# Patient Record
Sex: FEMALE | Race: OTHER | Employment: OTHER | URBAN - METROPOLITAN AREA
[De-identification: names, ages, dates, MRNs, and addresses within clinical notes are randomized per-mention and may not be internally consistent; named-entity substitution may affect disease eponyms.]

---

## 2017-10-13 ENCOUNTER — OFFICE VISIT (OUTPATIENT)
Dept: INTERNAL MEDICINE CLINIC | Facility: CLINIC | Age: 67
End: 2017-10-13

## 2017-10-13 VITALS
TEMPERATURE: 99 F | HEART RATE: 65 BPM | SYSTOLIC BLOOD PRESSURE: 128 MMHG | HEIGHT: 62.5 IN | WEIGHT: 191.38 LBS | DIASTOLIC BLOOD PRESSURE: 76 MMHG | BODY MASS INDEX: 34.34 KG/M2

## 2017-10-13 DIAGNOSIS — R14.0 BLOATED ABDOMEN: ICD-10-CM

## 2017-10-13 DIAGNOSIS — Z78.0 POSTMENOPAUSAL: ICD-10-CM

## 2017-10-13 DIAGNOSIS — Z23 NEED FOR VACCINATION: ICD-10-CM

## 2017-10-13 DIAGNOSIS — K21.9 GASTROESOPHAGEAL REFLUX DISEASE WITHOUT ESOPHAGITIS: ICD-10-CM

## 2017-10-13 DIAGNOSIS — Z00.00 ADULT GENERAL MEDICAL EXAMINATION: ICD-10-CM

## 2017-10-13 DIAGNOSIS — Z12.39 BREAST CANCER SCREENING: Primary | ICD-10-CM

## 2017-10-13 DIAGNOSIS — R00.2 PALPITATIONS: ICD-10-CM

## 2017-10-13 PROCEDURE — 90670 PCV13 VACCINE IM: CPT | Performed by: INTERNAL MEDICINE

## 2017-10-13 PROCEDURE — G0009 ADMIN PNEUMOCOCCAL VACCINE: HCPCS | Performed by: INTERNAL MEDICINE

## 2017-10-13 PROCEDURE — 99387 INIT PM E/M NEW PAT 65+ YRS: CPT | Performed by: INTERNAL MEDICINE

## 2017-10-13 PROCEDURE — G0402 INITIAL PREVENTIVE EXAM: HCPCS | Performed by: INTERNAL MEDICINE

## 2017-10-13 PROCEDURE — 90653 IIV ADJUVANT VACCINE IM: CPT | Performed by: INTERNAL MEDICINE

## 2017-10-13 PROCEDURE — G0008 ADMIN INFLUENZA VIRUS VAC: HCPCS | Performed by: INTERNAL MEDICINE

## 2017-10-13 PROCEDURE — 81003 URINALYSIS AUTO W/O SCOPE: CPT | Performed by: INTERNAL MEDICINE

## 2017-10-13 PROCEDURE — 99204 OFFICE O/P NEW MOD 45 MIN: CPT | Performed by: INTERNAL MEDICINE

## 2017-10-13 PROCEDURE — 36415 COLL VENOUS BLD VENIPUNCTURE: CPT | Performed by: INTERNAL MEDICINE

## 2017-10-13 PROCEDURE — G0403 EKG FOR INITIAL PREVENT EXAM: HCPCS | Performed by: INTERNAL MEDICINE

## 2017-10-13 RX ORDER — PANTOPRAZOLE SODIUM 40 MG/1
40 TABLET, DELAYED RELEASE ORAL
COMMUNITY
Start: 2017-10-12 | End: 2017-10-13 | Stop reason: ALTCHOICE

## 2017-10-13 RX ORDER — ALPRAZOLAM 1 MG/1
1 TABLET ORAL
COMMUNITY
Start: 2017-09-22 | End: 2017-11-10 | Stop reason: DRUGHIGH

## 2017-10-13 RX ORDER — OMEPRAZOLE 40 MG/1
40 CAPSULE, DELAYED RELEASE ORAL DAILY
Qty: 30 CAPSULE | Refills: 0 | Status: SHIPPED | OUTPATIENT
Start: 2017-10-13 | End: 2017-10-21

## 2017-10-13 NOTE — PROGRESS NOTES
HPI:    Patient ID: Dave Barton is a 74year old female. HPI  Dave Barton is a 74year old female who presents for a complete physical exam.   HPI:   Patient presents with:  Establish Care: Kim Jaffe.  Last pap sme Packs/day: 0.00      Years: 40.00       Smokeless tobacco: Never Used                        Alcohol use: No              Drug use: No            Social History Narrative      5 months ago.            Hx of Pap: all Paps normal for adenopathy. Does not bruise/bleed easily. Psychiatric/Behavioral: Negative for agitation, behavioral problems, confusion, decreased concentration, dysphoric mood, hallucinations, self-injury, sleep disturbance and suicidal ideas.  The patient is not n ear and ear canal normal. No lacerations. No drainage, swelling or tenderness. No foreign bodies. No mastoid tenderness. Tympanic membrane is not injected, not scarred, not perforated, not erythematous, not retracted and not bulging.  Tympanic membrane mobi Cardiovascular: Normal rate, regular rhythm, S1 normal, S2 normal, normal heart sounds, intact distal pulses and normal pulses. Pulses:       Carotid pulses are 2+ on the right side, and 2+ on the left side.        Radial pulses are 2+ on the right missy superficial cervical, no deep cervical and no posterior cervical adenopathy present. Left cervical: No superficial cervical, no deep cervical and no posterior cervical adenopathy present. She has no axillary adenopathy.         Right axillary: No Immunization Admin Counseling, Additional Component, 18 years and older    Meds This Visit:    Signed Prescriptions Disp Refills    Omeprazole 40 MG Oral Capsule Delayed Release 30 capsule 0      Sig: Take 1 capsule (40 mg total) by mouth daily.

## 2017-10-13 NOTE — PATIENT INSTRUCTIONS
ASSESSMENT/PLAN:   Breast cancer screening  (primary encounter diagnosis) Check mammogram. Continue self breast exam every month. Adult general medical examination Check blood and urine.      Gastroesophageal reflux disease without esophagitisCareful wi

## 2017-10-15 PROBLEM — R74.8 ELEVATED LIVER ENZYMES: Status: ACTIVE | Noted: 2017-10-15

## 2017-10-16 NOTE — PROGRESS NOTES
CBC Normal (white blood cells and red blood cells and platelets),   CMP Normal (electrolytes, sugar, kidney function), liver enzymes are elevated. Some more labs on. Which are still pending. Check liver ultrasound. As written.   Lipid (choilesterol) is

## 2017-10-17 ENCOUNTER — APPOINTMENT (OUTPATIENT)
Dept: LAB | Facility: HOSPITAL | Age: 67
End: 2017-10-17
Attending: INTERNAL MEDICINE
Payer: MEDICARE

## 2017-10-17 PROCEDURE — 87338 HPYLORI STOOL AG IA: CPT | Performed by: INTERNAL MEDICINE

## 2017-10-19 ENCOUNTER — TELEPHONE (OUTPATIENT)
Dept: INTERNAL MEDICINE CLINIC | Facility: CLINIC | Age: 67
End: 2017-10-19

## 2017-10-19 NOTE — TELEPHONE ENCOUNTER
Received call from pharmacist Ivett, want to verify dispense amount of Cmgrqyars-Guevrtxly-Rcfovzlad kit. Per pharmacyst 1 kit is for 14 days and 2 kits is for 28 day supply. Please verify amount of kit to dispense.

## 2017-10-20 ENCOUNTER — TELEPHONE (OUTPATIENT)
Dept: INTERNAL MEDICINE CLINIC | Facility: CLINIC | Age: 67
End: 2017-10-20

## 2017-10-20 NOTE — TELEPHONE ENCOUNTER
Pharmacy calling to inform rx for Yyebdaogn-Ikmcnlenc-Odgywmohf is at a cost for patient for $115. If you would like to send separate rx individually for more affordable cost to patient? Please advise.

## 2017-10-21 RX ORDER — OMEPRAZOLE 40 MG/1
40 CAPSULE, DELAYED RELEASE ORAL 2 TIMES DAILY
Qty: 28 CAPSULE | Refills: 0 | Status: SHIPPED | OUTPATIENT
Start: 2017-10-21 | End: 2017-11-04

## 2017-10-21 RX ORDER — AMOXICILLIN 500 MG/1
1000 CAPSULE ORAL 2 TIMES DAILY
Qty: 56 CAPSULE | Refills: 0 | Status: SHIPPED | OUTPATIENT
Start: 2017-10-21 | End: 2017-11-04

## 2017-10-21 RX ORDER — CLARITHROMYCIN 500 MG/1
TABLET, COATED ORAL
Qty: 28 TABLET | Refills: 0 | Status: SHIPPED | OUTPATIENT
Start: 2017-10-21 | End: 2017-11-10

## 2017-10-21 NOTE — TELEPHONE ENCOUNTER
Pharmacist states that the 3 would actually be cheaper for pt, please send 3 separate Rx if this is ok with you.

## 2017-10-22 NOTE — TELEPHONE ENCOUNTER
See new Rx. Start antibiotics ASAP and take as directed with food til done. Take probiotics while on antibiotics if can to prevent yeast infections. Take with food and finish. Abx. May cause N and metallic taste thus take with food.

## 2017-10-23 NOTE — TELEPHONE ENCOUNTER
Double checked with pharmacist at [facility] that the new prescription accurately matched the old antibiotic kit that was originally prescribed. Pharmacist verified that it was the same medication (minus probiotic portion) and dosage.   Pharmacist also verified

## 2017-10-27 ENCOUNTER — TELEPHONE (OUTPATIENT)
Dept: INTERNAL MEDICINE CLINIC | Facility: CLINIC | Age: 67
End: 2017-10-27

## 2017-10-27 NOTE — TELEPHONE ENCOUNTER
Pt walked in asking to Dr Susu Benavides to please refill her alprazolam   Would like to ask her if dr can refill it  Or change it

## 2017-10-27 NOTE — TELEPHONE ENCOUNTER
This is very addicting drug. Not to nb used long term due to addiction potential with drug. The nature of the drug. If for sleep, can try melatonin 6 mg at night with all lights out and clock turned. Set bedtime If cannot sleep don't stay in bed.  I don't f

## 2017-10-28 NOTE — TELEPHONE ENCOUNTER
Language  ID # [id number]. Patient advised of Dr. Solorio Brain instructions. Patient claims that she will not be able to sleep at all without medication.  was asked to repeat instructions.   Patient advised to schedule appointment if she

## 2017-11-10 ENCOUNTER — OFFICE VISIT (OUTPATIENT)
Dept: INTERNAL MEDICINE CLINIC | Facility: CLINIC | Age: 67
End: 2017-11-10

## 2017-11-10 VITALS
OXYGEN SATURATION: 98 % | TEMPERATURE: 99 F | HEIGHT: 62.5 IN | DIASTOLIC BLOOD PRESSURE: 62 MMHG | WEIGHT: 190.81 LBS | SYSTOLIC BLOOD PRESSURE: 110 MMHG | BODY MASS INDEX: 34.24 KG/M2 | HEART RATE: 66 BPM

## 2017-11-10 DIAGNOSIS — R31.9 HEMATURIA, UNSPECIFIED TYPE: ICD-10-CM

## 2017-11-10 DIAGNOSIS — F51.01 PRIMARY INSOMNIA: ICD-10-CM

## 2017-11-10 DIAGNOSIS — N93.9 VAGINAL BLEEDING: Primary | ICD-10-CM

## 2017-11-10 DIAGNOSIS — Z78.0 POSTMENOPAUSAL: ICD-10-CM

## 2017-11-10 DIAGNOSIS — R14.0 BLOATED ABDOMEN: ICD-10-CM

## 2017-11-10 DIAGNOSIS — Z12.39 BREAST CANCER SCREENING: ICD-10-CM

## 2017-11-10 PROCEDURE — 99215 OFFICE O/P EST HI 40 MIN: CPT | Performed by: INTERNAL MEDICINE

## 2017-11-10 PROCEDURE — G0463 HOSPITAL OUTPT CLINIC VISIT: HCPCS | Performed by: INTERNAL MEDICINE

## 2017-11-10 PROCEDURE — 81003 URINALYSIS AUTO W/O SCOPE: CPT | Performed by: INTERNAL MEDICINE

## 2017-11-10 RX ORDER — CITALOPRAM 20 MG/1
20 TABLET ORAL NIGHTLY
Qty: 30 TABLET | Refills: 2 | Status: SHIPPED | OUTPATIENT
Start: 2017-11-10 | End: 2017-12-01

## 2017-11-10 RX ORDER — OMEPRAZOLE 40 MG/1
40 CAPSULE, DELAYED RELEASE ORAL DAILY
Qty: 30 CAPSULE | Refills: 0 | Status: SHIPPED | OUTPATIENT
Start: 2017-11-10 | End: 2018-11-09

## 2017-11-10 RX ORDER — CLOTRIMAZOLE AND BETAMETHASONE DIPROPIONATE 10; .64 MG/G; MG/G
CREAM TOPICAL
Qty: 60 G | Refills: 3 | Status: SHIPPED | OUTPATIENT
Start: 2017-11-10 | End: 2020-05-13 | Stop reason: ALTCHOICE

## 2017-11-10 RX ORDER — OMEPRAZOLE 40 MG/1
40 CAPSULE, DELAYED RELEASE ORAL DAILY
Qty: 30 CAPSULE | Refills: 0 | Status: SHIPPED | OUTPATIENT
Start: 2017-11-10 | End: 2017-11-10

## 2017-11-10 NOTE — PATIENT INSTRUCTIONS
ASSESSMENT/PLAN:   Vaginal bleeding  (primary encounter diagnosis) Check pelvic and urine. Gyne. Hematuria, unspecified type Check urine. Breast cancer screening Check mammogram. Continue self breast exam every month.      Postmenopausal Check

## 2017-11-10 NOTE — PROGRESS NOTES
HPI:    Patient ID: Vick Hilton is a 74year old female. Vaginal blood when wipes. Urinary freq. X 1 week. Similar in the past. Told fibroids. Denies rectal pain. NL BM's. Insomnia X yrs. Goes to bed. At 930 PM. Fall asleep. Toss and turn. No tea. Current Outpatient Prescriptions:  Omeprazole 40 MG Oral Capsule Delayed Release Take 1 capsule (40 mg total) by mouth daily. Disp: 30 capsule Rfl: 0   clotrimazole-betamethasone 1-0.05 % External Cream 1 q12hrs.  Disp: 60 g Rfl: 3   Citalopram Hydrobro on the left side. Pulmonary/Chest: Effort normal and breath sounds normal. No accessory muscle usage. No apnea, no tachypnea and no bradypnea. No respiratory distress. She has no decreased breath sounds. She has no wheezes. She has no rhonchi.  She has no urine.     Breast cancer screening Check mammogram. Continue self breast exam every month. Postmenopausal Check dexa. Bloated abdomen Better. Finish omperazole 40 mg predinner. Check abdominal U/S. Careful with diet.  Avoid hot spicy foods and caffe

## 2017-11-16 ENCOUNTER — HOSPITAL ENCOUNTER (OUTPATIENT)
Dept: ULTRASOUND IMAGING | Facility: HOSPITAL | Age: 67
Discharge: HOME | End: 2017-11-16
Attending: INTERNAL MEDICINE
Payer: MEDICARE

## 2017-11-16 DIAGNOSIS — N93.9 VAGINAL BLEEDING: ICD-10-CM

## 2017-11-16 PROCEDURE — 76856 US EXAM PELVIC COMPLETE: CPT | Performed by: INTERNAL MEDICINE

## 2017-11-16 PROCEDURE — 76830 TRANSVAGINAL US NON-OB: CPT | Performed by: INTERNAL MEDICINE

## 2017-11-27 ENCOUNTER — HOSPITAL ENCOUNTER (OUTPATIENT)
Dept: ULTRASOUND IMAGING | Facility: HOSPITAL | Age: 67
Discharge: HOME | End: 2017-11-27
Attending: INTERNAL MEDICINE
Payer: MEDICARE

## 2017-11-27 DIAGNOSIS — R74.8 ELEVATED LIVER ENZYMES: ICD-10-CM

## 2017-11-27 PROCEDURE — 76705 ECHO EXAM OF ABDOMEN: CPT | Performed by: INTERNAL MEDICINE

## 2017-11-30 ENCOUNTER — TELEPHONE (OUTPATIENT)
Dept: OBGYN CLINIC | Facility: CLINIC | Age: 67
End: 2017-11-30

## 2017-11-30 ENCOUNTER — OFFICE VISIT (OUTPATIENT)
Dept: OBGYN CLINIC | Facility: CLINIC | Age: 67
End: 2017-11-30

## 2017-11-30 VITALS — DIASTOLIC BLOOD PRESSURE: 70 MMHG | BODY MASS INDEX: 34 KG/M2 | SYSTOLIC BLOOD PRESSURE: 122 MMHG | WEIGHT: 189 LBS

## 2017-11-30 DIAGNOSIS — N95.0 POSTMENOPAUSAL BLEEDING: Primary | ICD-10-CM

## 2017-11-30 PROCEDURE — 99204 OFFICE O/P NEW MOD 45 MIN: CPT | Performed by: OBSTETRICS & GYNECOLOGY

## 2017-11-30 RX ORDER — AZITHROMYCIN 500 MG/1
TABLET, FILM COATED ORAL
COMMUNITY
Start: 2017-11-21 | End: 2017-12-01

## 2017-11-30 RX ORDER — ALPRAZOLAM 0.5 MG/1
TABLET ORAL
COMMUNITY
Start: 2017-11-21 | End: 2018-03-23

## 2017-11-30 NOTE — TELEPHONE ENCOUNTER
Spoke to Raiza at  [facility] office and they are able to see our patient tomorrow at 3:30 for her pre-op. Called pt and she was thankful. Keeping surgery as scheduled. Informed Dr. Yanet Jain verbally.

## 2017-11-30 NOTE — TELEPHONE ENCOUNTER
Pt is scheduled this Monday 12/4 @ 11:30. Spoke to pt and informed her of surgery date, time and location. She Understood and verbalized agreement. Faxed form and confirmed with Roe De Leon in scheduling.   Routing to Dr. Serena Vidal for [address] [address]

## 2017-11-30 NOTE — TELEPHONE ENCOUNTER
Please schedule hysteroscopy and D&C with ultrasound guidance intraoperatively  Diagnosis postmenopausal bleeding

## 2017-11-30 NOTE — PROGRESS NOTES
HPI:    Patient ID: Bebe Perry is a 74year old female. HPI  GYN problem visit/consultation  Sent by   Postmenopausal bleeding. Noted blood on wiping herself first about a month ago. Last menstrual period came at age 41.   Denies any exoge Age of Onset   • Diabetes Mother       Social History: Smoking status: Former Smoker                                                              Packs/day: 0.00      Years: 40.00     Smokeless tobacco: Never Used                      Alcohol use:  No

## 2017-12-01 ENCOUNTER — APPOINTMENT (OUTPATIENT)
Dept: LAB | Facility: HOSPITAL | Age: 67
End: 2017-12-01
Attending: INTERNAL MEDICINE
Payer: MEDICARE

## 2017-12-01 ENCOUNTER — OFFICE VISIT (OUTPATIENT)
Dept: INTERNAL MEDICINE CLINIC | Facility: CLINIC | Age: 67
End: 2017-12-01

## 2017-12-01 VITALS
HEIGHT: 62.5 IN | SYSTOLIC BLOOD PRESSURE: 134 MMHG | TEMPERATURE: 98 F | OXYGEN SATURATION: 98 % | BODY MASS INDEX: 34.09 KG/M2 | DIASTOLIC BLOOD PRESSURE: 71 MMHG | HEART RATE: 60 BPM | WEIGHT: 190 LBS

## 2017-12-01 DIAGNOSIS — Z01.818 PRE-OP EXAMINATION: Primary | ICD-10-CM

## 2017-12-01 DIAGNOSIS — N93.9 VAGINAL BLEEDING: ICD-10-CM

## 2017-12-01 DIAGNOSIS — Z01.818 PRE-OP EXAMINATION: ICD-10-CM

## 2017-12-01 DIAGNOSIS — R10.0 ACUTE ABDOMEN: ICD-10-CM

## 2017-12-01 PROCEDURE — 86709 HEPATITIS A IGM ANTIBODY: CPT | Performed by: INTERNAL MEDICINE

## 2017-12-01 PROCEDURE — 85610 PROTHROMBIN TIME: CPT

## 2017-12-01 PROCEDURE — 86704 HEP B CORE ANTIBODY TOTAL: CPT | Performed by: INTERNAL MEDICINE

## 2017-12-01 PROCEDURE — 36415 COLL VENOUS BLD VENIPUNCTURE: CPT | Performed by: INTERNAL MEDICINE

## 2017-12-01 PROCEDURE — G0463 HOSPITAL OUTPT CLINIC VISIT: HCPCS | Performed by: INTERNAL MEDICINE

## 2017-12-01 PROCEDURE — 81003 URINALYSIS AUTO W/O SCOPE: CPT | Performed by: INTERNAL MEDICINE

## 2017-12-01 PROCEDURE — 99214 OFFICE O/P EST MOD 30 MIN: CPT | Performed by: INTERNAL MEDICINE

## 2017-12-01 PROCEDURE — 87340 HEPATITIS B SURFACE AG IA: CPT | Performed by: INTERNAL MEDICINE

## 2017-12-01 PROCEDURE — 80500 HEPATITIS A B + C PROFILE: CPT | Performed by: INTERNAL MEDICINE

## 2017-12-01 PROCEDURE — 86708 HEPATITIS A ANTIBODY: CPT | Performed by: INTERNAL MEDICINE

## 2017-12-01 PROCEDURE — 85730 THROMBOPLASTIN TIME PARTIAL: CPT

## 2017-12-01 PROCEDURE — 86803 HEPATITIS C AB TEST: CPT | Performed by: INTERNAL MEDICINE

## 2017-12-01 PROCEDURE — 86706 HEP B SURFACE ANTIBODY: CPT | Performed by: INTERNAL MEDICINE

## 2017-12-01 PROCEDURE — 82728 ASSAY OF FERRITIN: CPT | Performed by: INTERNAL MEDICINE

## 2017-12-01 NOTE — PATIENT INSTRUCTIONS
ASSESSMENT/PLAN:   Pre-op examination  (primary encounter diagnosis)Check blood and urine. Vaginal bleeding Check blood F/U gyne. Acute abdomen Check urine.        Orders Placed This Encounter      POC Urinalysis, Auto, w/o scope [29492]      PTT, A

## 2017-12-04 ENCOUNTER — HOSPITAL ENCOUNTER (OUTPATIENT)
Facility: HOSPITAL | Age: 67
Setting detail: HOSPITAL OUTPATIENT SURGERY
Discharge: HOME | End: 2017-12-04
Attending: OBSTETRICS & GYNECOLOGY | Admitting: OBSTETRICS & GYNECOLOGY
Payer: MEDICARE

## 2017-12-04 ENCOUNTER — ANESTHESIA EVENT (OUTPATIENT)
Dept: SURGERY | Facility: HOSPITAL | Age: 67
End: 2017-12-04
Payer: MEDICARE

## 2017-12-04 ENCOUNTER — ANESTHESIA (OUTPATIENT)
Dept: SURGERY | Facility: HOSPITAL | Age: 67
End: 2017-12-04
Payer: MEDICARE

## 2017-12-04 ENCOUNTER — APPOINTMENT (OUTPATIENT)
Dept: ULTRASOUND IMAGING | Facility: HOSPITAL | Age: 67
End: 2017-12-04
Attending: OBSTETRICS & GYNECOLOGY
Payer: MEDICARE

## 2017-12-04 ENCOUNTER — SURGERY (OUTPATIENT)
Age: 67
End: 2017-12-04

## 2017-12-04 VITALS
DIASTOLIC BLOOD PRESSURE: 63 MMHG | OXYGEN SATURATION: 96 % | TEMPERATURE: 98 F | HEIGHT: 62 IN | HEART RATE: 63 BPM | SYSTOLIC BLOOD PRESSURE: 146 MMHG | RESPIRATION RATE: 16 BRPM | BODY MASS INDEX: 34.23 KG/M2 | WEIGHT: 186 LBS

## 2017-12-04 DIAGNOSIS — D25.0 FIBROIDS, SUBMUCOSAL: Primary | ICD-10-CM

## 2017-12-04 PROCEDURE — 0UJD8ZZ INSPECTION OF UTERUS AND CERVIX, VIA NATURAL OR ARTIFICIAL OPENING ENDOSCOPIC: ICD-10-PCS | Performed by: OBSTETRICS & GYNECOLOGY

## 2017-12-04 PROCEDURE — 76998 US GUIDE INTRAOP: CPT | Performed by: OBSTETRICS & GYNECOLOGY

## 2017-12-04 PROCEDURE — 0UDB7ZX EXTRACTION OF ENDOMETRIUM, VIA NATURAL OR ARTIFICIAL OPENING, DIAGNOSTIC: ICD-10-PCS | Performed by: OBSTETRICS & GYNECOLOGY

## 2017-12-04 PROCEDURE — 0UB98ZZ EXCISION OF UTERUS, VIA NATURAL OR ARTIFICIAL OPENING ENDOSCOPIC: ICD-10-PCS | Performed by: OBSTETRICS & GYNECOLOGY

## 2017-12-04 PROCEDURE — 58558 HYSTEROSCOPY BIOPSY: CPT | Performed by: OBSTETRICS & GYNECOLOGY

## 2017-12-04 RX ORDER — MORPHINE SULFATE 10 MG/ML
6 INJECTION, SOLUTION INTRAMUSCULAR; INTRAVENOUS EVERY 10 MIN PRN
Status: DISCONTINUED | OUTPATIENT
Start: 2017-12-04 | End: 2017-12-04

## 2017-12-04 RX ORDER — DEXAMETHASONE SODIUM PHOSPHATE 4 MG/ML
VIAL (ML) INJECTION AS NEEDED
Status: DISCONTINUED | OUTPATIENT
Start: 2017-12-04 | End: 2017-12-04 | Stop reason: SURG

## 2017-12-04 RX ORDER — MORPHINE SULFATE 2 MG/ML
2 INJECTION, SOLUTION INTRAMUSCULAR; INTRAVENOUS EVERY 10 MIN PRN
Status: DISCONTINUED | OUTPATIENT
Start: 2017-12-04 | End: 2017-12-04

## 2017-12-04 RX ORDER — FAMOTIDINE 20 MG/1
20 TABLET ORAL ONCE
Status: COMPLETED | OUTPATIENT
Start: 2017-12-04 | End: 2017-12-04

## 2017-12-04 RX ORDER — KETOROLAC TROMETHAMINE 30 MG/ML
INJECTION, SOLUTION INTRAMUSCULAR; INTRAVENOUS AS NEEDED
Status: DISCONTINUED | OUTPATIENT
Start: 2017-12-04 | End: 2017-12-04 | Stop reason: SURG

## 2017-12-04 RX ORDER — ACETAMINOPHEN 325 MG/1
650 TABLET ORAL EVERY 4 HOURS PRN
Status: CANCELLED | OUTPATIENT
Start: 2017-12-04

## 2017-12-04 RX ORDER — ACETAMINOPHEN 500 MG
1000 TABLET ORAL ONCE
Status: COMPLETED | OUTPATIENT
Start: 2017-12-04 | End: 2017-12-04

## 2017-12-04 RX ORDER — HALOPERIDOL 5 MG/ML
0.25 INJECTION INTRAMUSCULAR ONCE AS NEEDED
Status: DISCONTINUED | OUTPATIENT
Start: 2017-12-04 | End: 2017-12-04

## 2017-12-04 RX ORDER — ONDANSETRON 2 MG/ML
INJECTION INTRAMUSCULAR; INTRAVENOUS AS NEEDED
Status: DISCONTINUED | OUTPATIENT
Start: 2017-12-04 | End: 2017-12-04 | Stop reason: SURG

## 2017-12-04 RX ORDER — HYDROMORPHONE HYDROCHLORIDE 1 MG/ML
0.6 INJECTION, SOLUTION INTRAMUSCULAR; INTRAVENOUS; SUBCUTANEOUS EVERY 5 MIN PRN
Status: DISCONTINUED | OUTPATIENT
Start: 2017-12-04 | End: 2017-12-04

## 2017-12-04 RX ORDER — ACETAMINOPHEN AND CODEINE PHOSPHATE 300; 30 MG/1; MG/1
1 TABLET ORAL EVERY 4 HOURS PRN
Status: CANCELLED | OUTPATIENT
Start: 2017-12-04

## 2017-12-04 RX ORDER — HYDROMORPHONE HYDROCHLORIDE 1 MG/ML
0.4 INJECTION, SOLUTION INTRAMUSCULAR; INTRAVENOUS; SUBCUTANEOUS EVERY 5 MIN PRN
Status: DISCONTINUED | OUTPATIENT
Start: 2017-12-04 | End: 2017-12-04

## 2017-12-04 RX ORDER — ONDANSETRON 4 MG/1
4 TABLET, FILM COATED ORAL EVERY 8 HOURS PRN
Status: CANCELLED | OUTPATIENT
Start: 2017-12-04

## 2017-12-04 RX ORDER — NALOXONE HYDROCHLORIDE 0.4 MG/ML
80 INJECTION, SOLUTION INTRAMUSCULAR; INTRAVENOUS; SUBCUTANEOUS AS NEEDED
Status: DISCONTINUED | OUTPATIENT
Start: 2017-12-04 | End: 2017-12-04

## 2017-12-04 RX ORDER — ONDANSETRON 2 MG/ML
4 INJECTION INTRAMUSCULAR; INTRAVENOUS ONCE AS NEEDED
Status: DISCONTINUED | OUTPATIENT
Start: 2017-12-04 | End: 2017-12-04

## 2017-12-04 RX ORDER — HYDROCODONE BITARTRATE AND ACETAMINOPHEN 5; 325 MG/1; MG/1
2 TABLET ORAL AS NEEDED
Status: DISCONTINUED | OUTPATIENT
Start: 2017-12-04 | End: 2017-12-04

## 2017-12-04 RX ORDER — CEFAZOLIN SODIUM 1 G/3ML
INJECTION, POWDER, FOR SOLUTION INTRAMUSCULAR; INTRAVENOUS AS NEEDED
Status: DISCONTINUED | OUTPATIENT
Start: 2017-12-04 | End: 2017-12-04 | Stop reason: SURG

## 2017-12-04 RX ORDER — GLYCOPYRROLATE 0.2 MG/ML
INJECTION INTRAMUSCULAR; INTRAVENOUS AS NEEDED
Status: DISCONTINUED | OUTPATIENT
Start: 2017-12-04 | End: 2017-12-04 | Stop reason: SURG

## 2017-12-04 RX ORDER — DIPHENHYDRAMINE HYDROCHLORIDE 50 MG/ML
12.5 INJECTION INTRAMUSCULAR; INTRAVENOUS EVERY 4 HOURS PRN
Status: CANCELLED | OUTPATIENT
Start: 2017-12-04

## 2017-12-04 RX ORDER — MIDAZOLAM HYDROCHLORIDE 1 MG/ML
INJECTION INTRAMUSCULAR; INTRAVENOUS AS NEEDED
Status: DISCONTINUED | OUTPATIENT
Start: 2017-12-04 | End: 2017-12-04 | Stop reason: SURG

## 2017-12-04 RX ORDER — ACETAMINOPHEN 325 MG/1
325 TABLET ORAL EVERY 6 HOURS PRN
COMMUNITY
End: 2017-12-04

## 2017-12-04 RX ORDER — MORPHINE SULFATE 4 MG/ML
4 INJECTION, SOLUTION INTRAMUSCULAR; INTRAVENOUS EVERY 10 MIN PRN
Status: DISCONTINUED | OUTPATIENT
Start: 2017-12-04 | End: 2017-12-04

## 2017-12-04 RX ORDER — SODIUM CHLORIDE, SODIUM LACTATE, POTASSIUM CHLORIDE, CALCIUM CHLORIDE 600; 310; 30; 20 MG/100ML; MG/100ML; MG/100ML; MG/100ML
INJECTION, SOLUTION INTRAVENOUS CONTINUOUS
Status: DISCONTINUED | OUTPATIENT
Start: 2017-12-04 | End: 2017-12-04

## 2017-12-04 RX ORDER — HYDROMORPHONE HYDROCHLORIDE 1 MG/ML
0.2 INJECTION, SOLUTION INTRAMUSCULAR; INTRAVENOUS; SUBCUTANEOUS EVERY 5 MIN PRN
Status: DISCONTINUED | OUTPATIENT
Start: 2017-12-04 | End: 2017-12-04

## 2017-12-04 RX ORDER — ONDANSETRON 2 MG/ML
4 INJECTION INTRAMUSCULAR; INTRAVENOUS EVERY 8 HOURS PRN
Status: CANCELLED | OUTPATIENT
Start: 2017-12-04

## 2017-12-04 RX ORDER — HYDROCODONE BITARTRATE AND ACETAMINOPHEN 5; 325 MG/1; MG/1
1 TABLET ORAL AS NEEDED
Status: DISCONTINUED | OUTPATIENT
Start: 2017-12-04 | End: 2017-12-04

## 2017-12-04 RX ORDER — LIDOCAINE HYDROCHLORIDE 10 MG/ML
INJECTION, SOLUTION EPIDURAL; INFILTRATION; INTRACAUDAL; PERINEURAL AS NEEDED
Status: DISCONTINUED | OUTPATIENT
Start: 2017-12-04 | End: 2017-12-04 | Stop reason: SURG

## 2017-12-04 RX ORDER — METOCLOPRAMIDE 10 MG/1
10 TABLET ORAL ONCE
Status: COMPLETED | OUTPATIENT
Start: 2017-12-04 | End: 2017-12-04

## 2017-12-04 RX ORDER — SODIUM CHLORIDE 0.9 % (FLUSH) 0.9 %
10 SYRINGE (ML) INJECTION AS NEEDED
Status: DISCONTINUED | OUTPATIENT
Start: 2017-12-04 | End: 2017-12-04 | Stop reason: HOSPADM

## 2017-12-04 RX ORDER — ACETAMINOPHEN AND CODEINE PHOSPHATE 300; 30 MG/1; MG/1
2 TABLET ORAL EVERY 4 HOURS PRN
Status: CANCELLED | OUTPATIENT
Start: 2017-12-04

## 2017-12-04 RX ADMIN — ONDANSETRON 4 MG: 2 INJECTION INTRAMUSCULAR; INTRAVENOUS at 13:36:00

## 2017-12-04 RX ADMIN — SODIUM CHLORIDE, SODIUM LACTATE, POTASSIUM CHLORIDE, CALCIUM CHLORIDE: 600; 310; 30; 20 INJECTION, SOLUTION INTRAVENOUS at 13:35:00

## 2017-12-04 RX ADMIN — MIDAZOLAM HYDROCHLORIDE 2 MG: 1 INJECTION INTRAMUSCULAR; INTRAVENOUS at 12:16:00

## 2017-12-04 RX ADMIN — GLYCOPYRROLATE 0.2 MG: 0.2 INJECTION INTRAMUSCULAR; INTRAVENOUS at 13:29:00

## 2017-12-04 RX ADMIN — KETOROLAC TROMETHAMINE 30 MG: 30 INJECTION, SOLUTION INTRAMUSCULAR; INTRAVENOUS at 13:10:00

## 2017-12-04 RX ADMIN — LIDOCAINE HYDROCHLORIDE 50 MG: 10 INJECTION, SOLUTION EPIDURAL; INFILTRATION; INTRACAUDAL; PERINEURAL at 12:20:00

## 2017-12-04 RX ADMIN — DEXAMETHASONE SODIUM PHOSPHATE 8 MG: 4 MG/ML VIAL (ML) INJECTION at 12:24:00

## 2017-12-04 RX ADMIN — SODIUM CHLORIDE, SODIUM LACTATE, POTASSIUM CHLORIDE, CALCIUM CHLORIDE: 600; 310; 30; 20 INJECTION, SOLUTION INTRAVENOUS at 12:16:00

## 2017-12-04 RX ADMIN — CEFAZOLIN SODIUM 1 G: 1 INJECTION, POWDER, FOR SOLUTION INTRAMUSCULAR; INTRAVENOUS at 12:59:00

## 2017-12-04 NOTE — BRIEF OP NOTE
Pre-Operative Diagnosis: Postmenopausal bleeding      Post-Operative Diagnosis: Postmenopausal bleeding submucus fibroid     Procedure Performed:   Procedure(s):  Hysteroscopy dilation and curettage under ultrasound guidance myomectomy with myosure

## 2017-12-04 NOTE — OPERATIVE REPORT
[facility]    PATIENT'S NAME: Bruce Dahlia   ATTENDING PHYSICIAN: Mandie Gonzales MD   OPERATING PHYSICIAN: Mandie Gonzales MD   PATIENT ACCOUNT#:   [de-identified]    LOCATION:  [place] [address]  MEDICAL RECORD #:   [id number]       DATE Levi Interiano a pelvic exam was performed. The uterus was difficult to reach due to the long nulliparous vagina and the patient's body habitus.   Noted to visualize the cervix, 2 Julito retractors were required to be placed, and then the anterior lip of the cervix was gr

## 2017-12-04 NOTE — ANESTHESIA POSTPROCEDURE EVALUATION
Patient: Javi Baca    Procedure Summary     Date:  12/04/17 Room / Location:  [address] [place] [address] MAIN OR 16 / [address] [place] [address] MAIN OR    Anesthesia Start:  0241 Anesthesia Stop:  2583    Procedure:  HYSTEROSCOPY DILATION AND CURETTAGE (N/A Vagina ) Diagnosis:  (Postmenopausal b

## 2017-12-04 NOTE — ANESTHESIA PREPROCEDURE EVALUATION
Anesthesia PreOp Note    HPI:     Kinza Scott is a 74year old female who presents for preoperative consultation requested by: Gi Tyler MD    Date of Surgery: 12/4/2017    Procedure(s):   HYSTEROSCOPY DILATION AND CURETTAGE  Indication: Postmeno Number of children: N/A     Occupational History  Retired from [employer].         Social History Main Topics   Smoking status: Former Smoker  1.00 Packs/day  For 40.00 Years     Quit date: 2007    Smokeless tobacco: Never Used    Alcohol use No informed Werner Curling  of the nature of the anesthetic plan, benefits, risks, major complications, and any alternative forms of anesthetic management. All of the patient's questions were answered to the best of my ability.  The patient desires the anest

## 2017-12-04 NOTE — INTERVAL H&P NOTE
Pre-op Diagnosis: Postmenopausal bleeding     The above referenced H&P was reviewed by Jorge Luis Ornelas MD on 12/4/2017, the patient was examined and no significant changes have occurred in the patient's condition since the H&P was performed.   I discussed

## 2017-12-04 NOTE — PROGRESS NOTES
HPI:    Patient ID: Rios Clinton is a 74year old female. HPI  Rios Clinton is a 74year old female who presents for a pre-operative physical exam.   Pt has had previous anesthesia:  Yes.   Previous complications:  No  Patient presents with:  Pre-Op 12/01/17  -URINALYSIS, AUTO, W/O SCOPE   Result Value Ref Range   GLUCOSE (URINE DIPSTICK) neg mg/dL   BILIRUBIN neg Negative   KETONES (URINE DIPSTICK) neg Negative mg/dL   SPECIFIC GRAVITY 1.015 1.005 - 1.030   OCCULT BLOOD trace Negative   PH, URINE 7.0 tremors, seizures, syncope, facial asymmetry, speech difficulty, weakness, light-headedness, numbness and headaches. Hematological: Negative for adenopathy.    Psychiatric/Behavioral: Negative for agitation, behavioral problems, confusion, decreased nicola Physical Exam   Constitutional: She is oriented to person, place, and time. Vital signs are normal. She appears well-developed and well-nourished. No distress. HENT:   Head: Normocephalic and atraumatic.    Right Ear: Tympanic membrane, external ear and e discharge, no exudate and no hordeolum. No foreign body present in the left eye. Right conjunctiva is not injected. Right conjunctiva has no hemorrhage. Left conjunctiva is not injected. Left conjunctiva has no hemorrhage. No scleral icterus.  Right eye exh preauricular, no posterior auricular and no occipital adenopathy present. Head (left side): No submental, no submandibular, no tonsillar, no preauricular, no posterior auricular and no occipital adenopathy present.      She has no cervical adenopathy (primary encounter diagnosis)  Vaginal bleeding  Acute abdomen     Plan   Orders:    Orders Placed This Encounter      POC Urinalysis, Auto, w/o scope [34730]      PTT, Activated [E]      Prothrombin Time (PT) [E]  Medications filled today:    No prescript

## 2017-12-04 NOTE — H&P
[address] [facility] Patient Status:  Hospital Outpatient Surgery    1950 MRN [id number]   Location [place] [address] Attending Jayna Rankin MD   Hosp Day # 0 PCP Rheta Getting. Excell Bence History:  2000: BREAST SURGERY Right      Comment: Removed Mass from breast two times Benign.    No date: COLONOSCOPY      Comment: [facility]  No date: COLONOSCOPY    Past OB History:  OB History    Para Term  AB Living   3 3       3 labia. There is no rash, tenderness, lesion or injury on the left labia. No erythema, tenderness or bleeding in the vagina. No foreign body around the vagina. No signs of injury around the vagina. No vaginal discharge found.      External genitalia normal. thromboembolism, and anesthetic risks, among others.     Marcia Lemus MD  12/3/2017  6:21 PM

## 2017-12-09 ENCOUNTER — OFFICE VISIT (OUTPATIENT)
Dept: OBGYN CLINIC | Facility: CLINIC | Age: 67
End: 2017-12-09

## 2017-12-09 VITALS — TEMPERATURE: 98 F | DIASTOLIC BLOOD PRESSURE: 79 MMHG | SYSTOLIC BLOOD PRESSURE: 139 MMHG

## 2017-12-09 DIAGNOSIS — Z98.890 POSTOPERATIVE STATE: Primary | ICD-10-CM

## 2017-12-09 PROCEDURE — 99024 POSTOP FOLLOW-UP VISIT: CPT | Performed by: OBSTETRICS & GYNECOLOGY

## 2017-12-09 NOTE — PROGRESS NOTES
HPI:    Patient ID: Javi Baca is a 74year old female. HPI  F/U  S/p hysteroscopy, D & C 12/4, myomectomy. Noted vaginal bleeding 2-3 days, not scant. Denies pain, fever, chills.   Review of Systems         Current Outpatient Prescriptions:  Leanna Boyd

## 2017-12-19 ENCOUNTER — OFFICE VISIT (OUTPATIENT)
Dept: OBGYN CLINIC | Facility: CLINIC | Age: 67
End: 2017-12-19

## 2017-12-19 VITALS — BODY MASS INDEX: 35 KG/M2 | DIASTOLIC BLOOD PRESSURE: 72 MMHG | SYSTOLIC BLOOD PRESSURE: 124 MMHG | WEIGHT: 190 LBS

## 2017-12-19 DIAGNOSIS — Z98.890 POSTOPERATIVE STATE: Primary | ICD-10-CM

## 2017-12-19 PROCEDURE — 99212 OFFICE O/P EST SF 10 MIN: CPT | Performed by: OBSTETRICS & GYNECOLOGY

## 2017-12-19 RX ORDER — GUAIFENESIN AND CODEINE PHOSPHATE 10; 100 MG/5ML; MG/5ML
LIQUID ORAL
Refills: 0 | COMMUNITY
Start: 2017-11-21 | End: 2018-05-18 | Stop reason: ALTCHOICE

## 2017-12-19 NOTE — PROGRESS NOTES
HPI:    Patient ID: Frank Malcolm is a 74year old female. HPI  Postop   No c/o. No vaginal bleeding. No pain.   Review of Systems         Current Outpatient Prescriptions:  GUAIATUSSIN -10 MG/5ML Oral Syrup  Disp:  Rfl: 0   ALPRAZolam 0.5 MG Or

## 2017-12-26 ENCOUNTER — HOSPITAL ENCOUNTER (OUTPATIENT)
Dept: MAMMOGRAPHY | Facility: HOSPITAL | Age: 67
Discharge: HOME | End: 2017-12-26
Attending: INTERNAL MEDICINE
Payer: MEDICARE

## 2017-12-26 ENCOUNTER — HOSPITAL ENCOUNTER (OUTPATIENT)
Dept: BONE DENSITY | Facility: HOSPITAL | Age: 67
Discharge: HOME | End: 2017-12-26
Attending: INTERNAL MEDICINE
Payer: MEDICARE

## 2017-12-26 DIAGNOSIS — Z12.39 BREAST CANCER SCREENING: ICD-10-CM

## 2017-12-26 DIAGNOSIS — Z78.0 POSTMENOPAUSAL: ICD-10-CM

## 2017-12-26 PROCEDURE — 77067 SCR MAMMO BI INCL CAD: CPT | Performed by: INTERNAL MEDICINE

## 2017-12-26 PROCEDURE — 77080 DXA BONE DENSITY AXIAL: CPT | Performed by: INTERNAL MEDICINE

## 2017-12-28 NOTE — PROGRESS NOTES
Nl dexa. Take calcium 600 every 12 hrs. With vitamin D 400 IU every  12 hrs. Excerise at least 30 minutes 3-4 times a week.

## 2018-01-08 ENCOUNTER — OFFICE VISIT (OUTPATIENT)
Dept: INTERNAL MEDICINE CLINIC | Facility: CLINIC | Age: 68
End: 2018-01-08

## 2018-01-08 VITALS
TEMPERATURE: 99 F | DIASTOLIC BLOOD PRESSURE: 87 MMHG | HEIGHT: 62.5 IN | SYSTOLIC BLOOD PRESSURE: 130 MMHG | BODY MASS INDEX: 34.7 KG/M2 | WEIGHT: 193.38 LBS | HEART RATE: 75 BPM | OXYGEN SATURATION: 98 %

## 2018-01-08 DIAGNOSIS — R09.81 SINUS CONGESTION: ICD-10-CM

## 2018-01-08 DIAGNOSIS — J01.00 ACUTE MAXILLARY SINUSITIS, RECURRENCE NOT SPECIFIED: Primary | ICD-10-CM

## 2018-01-08 PROCEDURE — G0463 HOSPITAL OUTPT CLINIC VISIT: HCPCS | Performed by: INTERNAL MEDICINE

## 2018-01-08 PROCEDURE — 99213 OFFICE O/P EST LOW 20 MIN: CPT | Performed by: INTERNAL MEDICINE

## 2018-01-08 RX ORDER — AMOXICILLIN 875 MG/1
875 TABLET, COATED ORAL 2 TIMES DAILY
Qty: 20 TABLET | Refills: 0 | Status: SHIPPED | OUTPATIENT
Start: 2018-01-08 | End: 2018-01-18

## 2018-01-09 NOTE — PROGRESS NOTES
HPI:    Patient ID: Lex Curling is a 74year old female. HPI  Pt comes in with complaint of sinus pain and  Pressure for few days, green yellow discharge     Review of Systems   Constitutional: Positive for fatigue.    HENT: Positive for sinus pain an the defined types were placed in this encounter. Meds This Visit:  Signed Prescriptions Disp Refills    amoxicillin 875 MG Oral Tab 20 tablet 0      Sig: Take 1 tablet (875 mg total) by mouth 2 (two) times daily.            Imaging & Referrals:  None

## 2018-01-09 NOTE — PATIENT INSTRUCTIONS
ASSESSMENT/PLAN:   Acute maxillary sinusitis, recurrence not specified  (primary encounter diagnosis)- will treat with ab, take as prescribed, rest and drink ore fluids   Sinus congestion- as  Need saline nasal spray

## 2018-01-16 ENCOUNTER — OFFICE VISIT (OUTPATIENT)
Dept: SLEEP CENTER | Age: 68
End: 2018-01-16
Attending: INTERNAL MEDICINE
Payer: MEDICARE

## 2018-01-16 DIAGNOSIS — Z76.89 SLEEP CONCERN: Primary | ICD-10-CM

## 2018-01-16 PROCEDURE — 95810 POLYSOM 6/> YRS 4/> PARAM: CPT

## 2018-01-18 PROBLEM — G47.33 OBSTRUCTIVE SLEEP APNEA SYNDROME: Status: ACTIVE | Noted: 2018-01-18

## 2018-01-18 NOTE — PROCEDURES
[address] [facility]  Accredited by the [place] of [employer] (AASM)    PATIENT'S NAME: José Miguel Kulkarni   ATTENDING PHYSICIAN: Stefani Mitchell MD   REFERRING PHYSICIAN: Bipin Dacosta.  Ling Mitchell MD   PATIENT ACCOUNT #: [de-identified] LOCATION: Sle respiratory event related arousal index was 2.5 events per hour and the spontaneous arousal index was 3.3 events per hour for a combined arousal index of 5.8 events per hour. There were no significant periodic limb movements.   The lowest desaturation was

## 2018-02-16 ENCOUNTER — OFFICE VISIT (OUTPATIENT)
Dept: INTERNAL MEDICINE CLINIC | Facility: CLINIC | Age: 68
End: 2018-02-16

## 2018-02-16 VITALS
DIASTOLIC BLOOD PRESSURE: 64 MMHG | SYSTOLIC BLOOD PRESSURE: 117 MMHG | WEIGHT: 194 LBS | BODY MASS INDEX: 34.81 KG/M2 | TEMPERATURE: 97 F | HEART RATE: 70 BPM | HEIGHT: 62.5 IN | OXYGEN SATURATION: 98 %

## 2018-02-16 DIAGNOSIS — F51.01 PRIMARY INSOMNIA: ICD-10-CM

## 2018-02-16 DIAGNOSIS — E78.00 HIGH CHOLESTEROL: ICD-10-CM

## 2018-02-16 DIAGNOSIS — R74.8 ELEVATED LIVER ENZYMES: ICD-10-CM

## 2018-02-16 DIAGNOSIS — Z23 NEED FOR VACCINATION: ICD-10-CM

## 2018-02-16 DIAGNOSIS — G47.33 OBSTRUCTIVE SLEEP APNEA SYNDROME: ICD-10-CM

## 2018-02-16 DIAGNOSIS — Z12.39 BREAST CANCER SCREENING: Primary | ICD-10-CM

## 2018-02-16 DIAGNOSIS — R31.9 HEMATURIA, UNSPECIFIED TYPE: ICD-10-CM

## 2018-02-16 DIAGNOSIS — Z78.0 POSTMENOPAUSAL: ICD-10-CM

## 2018-02-16 PROCEDURE — G0463 HOSPITAL OUTPT CLINIC VISIT: HCPCS | Performed by: INTERNAL MEDICINE

## 2018-02-16 PROCEDURE — G0009 ADMIN PNEUMOCOCCAL VACCINE: HCPCS | Performed by: INTERNAL MEDICINE

## 2018-02-16 PROCEDURE — 90732 PPSV23 VACC 2 YRS+ SUBQ/IM: CPT | Performed by: INTERNAL MEDICINE

## 2018-02-16 PROCEDURE — 99214 OFFICE O/P EST MOD 30 MIN: CPT | Performed by: INTERNAL MEDICINE

## 2018-02-16 NOTE — PROGRESS NOTES
j HPI:    Patient ID: Herlinda Lau is a 74year old female. HPI   Here for F/U sleep apnea. Exercise level: somewhat active and has been following low salt diet. Weight has been stable.   Wt Readings from Last 3 Encounters:  02/16/18 : 194 lb (88 0   clotrimazole-betamethasone 1-0.05 % External Cream 1 q12hrs.  Disp: 60 g Rfl: 3   GUAIATUSSIN -10 MG/5ML Oral Syrup  Disp:  Rfl: 0     Allergies:No Known Allergies    HISTORY:  Past Medical History:   Diagnosis Date   • Acid reflux disease    • Br has no rhonchi. She has no rales. She exhibits no tenderness. Musculoskeletal: She exhibits no edema. Neurological: She is alert and oriented to person, place, and time. Skin: Skin is warm and dry. She is not diaphoretic.    Psychiatric: She has a nor

## 2018-02-16 NOTE — PATIENT INSTRUCTIONS
ASSESSMENT/PLAN:   Breast cancer screening  (primary encounter diagnosis) Normal mammogram. Continue self breast exam every month. Postmenopausal Dexa done. Primary insomnia Sleep study shows sleep apnea. Check 2nd part. Pt. Has justice't 3-7-18.  F/U 4 útiles:  · Ronna ejercicios de respiración profunda en la cama. [place] le ayudará a relajarse y a quedarse dormido. · No chris cafeína después del mediodía. · Intente acostarse y levantarse alrededor de la misma hora todos los días.  [place] ayuda a que mosquera cuerp usted [facility] duerme. Ally aire ingresa por mosquera nariz y va hasta andie pulmones, con lo cual las vías respiratorias se mantienen abiertas.   Consejos al usar un dispositivo CPAP o BiPAP  · Si mosquera máscara no le calza mike o la siente incómoda, hable con mosquera p Obstructiva del Sueño”. Ocurre habitualmente cuando los tejidos y [employer] de la parte posterior de la garganta se enangostan o se bloquean yung el sueño.  La respiración se frena o para completamente y entonces usted se despierta, respira mike varias ve realiza con un estudio de sueño. [place]better proveedor de [employer] puede darle más información sobre [place]. [address]  La apnea del sueño puede hacerlo más propenso a determinados problemas de [place].  Entre ellos dorinda presión Estelle Shukla arely son los ronquidos. Apnea del sueño  Si las estructuras de la garganta se bloquean completamente, el aire no puede llegar a los pulmones. Melissa trastorno se llama apnea (que significa “ausencia de respiración”).  [employer] pulmones no reci cambios  El tratamiento de mosquera karlo puede requerir algunas modificaciones a ciertos hábitos de rashaad. ted puede ayudar a mosquera karlo a adoptar y mantener estos cambios.  Por ejemplo:  · Apoye el programa de ejercicios de mosquera karlo o incluso inscríbase marshall

## 2018-02-24 ENCOUNTER — LAB ENCOUNTER (OUTPATIENT)
Dept: LAB | Facility: HOSPITAL | Age: 68
End: 2018-02-24
Attending: INTERNAL MEDICINE
Payer: MEDICARE

## 2018-02-24 DIAGNOSIS — E78.00 HIGH CHOLESTEROL: ICD-10-CM

## 2018-02-24 DIAGNOSIS — R74.8 ELEVATED LIVER ENZYMES: ICD-10-CM

## 2018-02-24 LAB
ALBUMIN SERPL BCP-MCNC: 3.8 G/DL (ref 3.5–4.8)
ALBUMIN/GLOB SERPL: 1.2 {RATIO} (ref 1–2)
ALP SERPL-CCNC: 81 U/L (ref 32–100)
ALT SERPL-CCNC: 72 U/L (ref 14–54)
ANION GAP SERPL CALC-SCNC: 8 MMOL/L (ref 0–18)
AST SERPL-CCNC: 55 U/L (ref 15–41)
BILIRUB SERPL-MCNC: 0.5 MG/DL (ref 0.3–1.2)
BUN SERPL-MCNC: 8 MG/DL (ref 8–20)
BUN/CREAT SERPL: 12.9 (ref 10–20)
CALCIUM SERPL-MCNC: 9.4 MG/DL (ref 8.5–10.5)
CHLORIDE SERPL-SCNC: 104 MMOL/L (ref 95–110)
CHOLEST SERPL-MCNC: 171 MG/DL (ref 110–200)
CO2 SERPL-SCNC: 27 MMOL/L (ref 22–32)
CREAT SERPL-MCNC: 0.62 MG/DL (ref 0.5–1.5)
GLOBULIN PLAS-MCNC: 3.1 G/DL (ref 2.5–3.7)
GLUCOSE SERPL-MCNC: 123 MG/DL (ref 70–99)
HDLC SERPL-MCNC: 44 MG/DL
LDLC SERPL CALC-MCNC: 97 MG/DL (ref 0–99)
NONHDLC SERPL-MCNC: 127 MG/DL
OSMOLALITY UR CALC.SUM OF ELEC: 288 MOSM/KG (ref 275–295)
PATIENT FASTING: YES
POTASSIUM SERPL-SCNC: 4.3 MMOL/L (ref 3.3–5.1)
PROT SERPL-MCNC: 6.9 G/DL (ref 5.9–8.4)
SODIUM SERPL-SCNC: 139 MMOL/L (ref 136–144)
TRIGL SERPL-MCNC: 151 MG/DL (ref 1–149)

## 2018-02-24 PROCEDURE — 80061 LIPID PANEL: CPT

## 2018-02-24 PROCEDURE — 80053 COMPREHEN METABOLIC PANEL: CPT

## 2018-02-24 PROCEDURE — 36415 COLL VENOUS BLD VENIPUNCTURE: CPT

## 2018-03-03 PROBLEM — R73.9 HYPERGLYCEMIA: Status: ACTIVE | Noted: 2018-03-03

## 2018-03-03 NOTE — PROGRESS NOTES
CMP Normal (electrolytes, kidney and liver functions), sugar is elevated. Some more blood work. Orders written. Liver enzymes are better but still elevated. Up in the past including liver ultrasound just showed fatty liver. Decrease weight.   Lipid (ch

## 2018-03-07 ENCOUNTER — OFFICE VISIT (OUTPATIENT)
Dept: SLEEP CENTER | Age: 68
End: 2018-03-07
Attending: INTERNAL MEDICINE
Payer: MEDICARE

## 2018-03-07 DIAGNOSIS — Z76.89 SLEEP CONCERN: Primary | ICD-10-CM

## 2018-03-07 PROCEDURE — 95811 POLYSOM 6/>YRS CPAP 4/> PARM: CPT

## 2018-03-11 NOTE — PROCEDURES
[address] [facility]  Accredited by the [facility] of Sleep Medicine (AASM)    PATIENT'S NAME: Shailesh Yao   ATTENDING PHYSICIAN: Stefani Benavides MD   REFERRING PHYSICIAN: Iron Liu.  Ann Benavides MD   PATIENT ACCOUNT #: [id number] LOCATION: [facility] 0.2 events per hour and the spontaneous arousal index was 1.4 events per hour for a combined arousal index of 1.6 events per hour. There were no significant periodic limb movements. The lowest desaturation was to 90%.   The average heart rate was 58 beats

## 2018-03-23 ENCOUNTER — OFFICE VISIT (OUTPATIENT)
Dept: INTERNAL MEDICINE CLINIC | Facility: CLINIC | Age: 68
End: 2018-03-23

## 2018-03-23 ENCOUNTER — TELEPHONE (OUTPATIENT)
Dept: INTERNAL MEDICINE CLINIC | Facility: CLINIC | Age: 68
End: 2018-03-23

## 2018-03-23 VITALS
SYSTOLIC BLOOD PRESSURE: 132 MMHG | HEART RATE: 71 BPM | HEIGHT: 62.5 IN | WEIGHT: 191 LBS | TEMPERATURE: 98 F | OXYGEN SATURATION: 98 % | DIASTOLIC BLOOD PRESSURE: 79 MMHG | BODY MASS INDEX: 34.27 KG/M2

## 2018-03-23 DIAGNOSIS — F51.01 PRIMARY INSOMNIA: ICD-10-CM

## 2018-03-23 DIAGNOSIS — E78.00 HIGH CHOLESTEROL: Primary | ICD-10-CM

## 2018-03-23 DIAGNOSIS — R73.9 HYPERGLYCEMIA: ICD-10-CM

## 2018-03-23 DIAGNOSIS — R74.8 ELEVATED LIVER ENZYMES: ICD-10-CM

## 2018-03-23 DIAGNOSIS — G47.33 OBSTRUCTIVE SLEEP APNEA SYNDROME: ICD-10-CM

## 2018-03-23 PROCEDURE — G0463 HOSPITAL OUTPT CLINIC VISIT: HCPCS | Performed by: INTERNAL MEDICINE

## 2018-03-23 PROCEDURE — 99214 OFFICE O/P EST MOD 30 MIN: CPT | Performed by: INTERNAL MEDICINE

## 2018-03-23 RX ORDER — ALPRAZOLAM 0.5 MG/1
0.5 TABLET ORAL NIGHTLY PRN
Qty: 15 TABLET | Refills: 0 | Status: SHIPPED | OUTPATIENT
Start: 2018-03-23 | End: 2020-05-12

## 2018-03-23 RX ORDER — FLUOXETINE HYDROCHLORIDE 20 MG/1
20 CAPSULE ORAL NIGHTLY
Qty: 30 CAPSULE | Refills: 1 | Status: SHIPPED | OUTPATIENT
Start: 2018-03-23 | End: 2020-05-13

## 2018-03-23 NOTE — PROGRESS NOTES
HPI:    Patient ID: Lex Curling is a 74year old female. HPI   F/U sleep study. Has not received mask yet. Has been on alprazolam X yrs. Cannot sleep without it. Celexa tried for 1 month and no help. No SE's.       Exercise level: not active and has b The patient is nervous/anxious. The patient is not hyperactive. All other systems reviewed and are negative. Current Outpatient Prescriptions:  FLUoxetine HCl 20 MG Oral Cap Take 1 capsule (20 mg total) by mouth nightly.  Disp: 30 capsule Rfl: 1 pulses are 2+ on the right side, and 2+ on the left side. Radial pulses are 2+ on the right side, and 2+ on the left side. Dorsalis pedis pulses are 2+ on the right side, and 2+ on the left side.         Posterior tibial pulses are 2+ on the ri

## 2018-03-23 NOTE — PATIENT INSTRUCTIONS
ASSESSMENT/PLAN:   High cholesterol  (primary encounter diagnosis) Doing good 2-18. Lower  carbs. Hyperglycemia Check A1C. Lower carbs. Primary insomnia Sleep hygeine. Try to decrease alrazolam. Try mask. Use prozac 20 mg at night.  Discussed

## 2018-03-27 NOTE — TELEPHONE ENCOUNTER
Faxed face sheet, referral, progress notes, sleep study and titration to [facility]. Verified if they accept patient's insurance, per [facility] they do accept Medicare part B. Fax successful.

## 2018-04-04 NOTE — TELEPHONE ENCOUNTER
Spoke with Fantasma Singh to follow up CPAP referral. Fantasma Singh has received required documents but requesting progress notes prior to sleep study. Progress note for 11/10/17 faxed to [facility], the note where Dr. Magi Estrada placed order for sleep study.  Fax succes

## 2018-04-09 NOTE — TELEPHONE ENCOUNTER
Call placed to [facility] for update of CPAP status. Spoke with Serina Correa, per Serina Correa she needs the signed order form from [facility]. Gave fax number for [facility] [phone]. Awaiting for fax.

## 2018-04-13 NOTE — TELEPHONE ENCOUNTER
Call placed to [facility], spoke with Janel Rinne. Stated that they have received the DME form that needs to be signed by PCP and patient needs to schedule fitting of mask.  They called her 4/9 to schedule but [facility] does not have staff that speaks rosa

## 2018-04-20 ENCOUNTER — TELEPHONE (OUTPATIENT)
Dept: INTERNAL MEDICINE CLINIC | Facility: CLINIC | Age: 68
End: 2018-04-20

## 2018-04-20 NOTE — TELEPHONE ENCOUNTER
Looks like according to the [facility] that she never picked it up. We can call in for the same directions and same amount from the March 23 visit.

## 2018-04-20 NOTE — TELEPHONE ENCOUNTER
Refill called to [facility] for quantity #15 and left message on patient voice mail refill called to [facility].

## 2018-04-20 NOTE — TELEPHONE ENCOUNTER
Pt walk in asking  To  her rx for Alprazolam, since 03-23? She did not picked up on the time she said.   Please call pharmacy make sure that, and send a message to Dr Joana Gonzales so she can re-print

## 2018-05-07 ENCOUNTER — OFFICE VISIT (OUTPATIENT)
Dept: INTERNAL MEDICINE CLINIC | Facility: CLINIC | Age: 68
End: 2018-05-07

## 2018-05-07 VITALS
HEIGHT: 62 IN | RESPIRATION RATE: 18 BRPM | OXYGEN SATURATION: 97 % | BODY MASS INDEX: 34.41 KG/M2 | SYSTOLIC BLOOD PRESSURE: 136 MMHG | TEMPERATURE: 98 F | DIASTOLIC BLOOD PRESSURE: 78 MMHG | WEIGHT: 187 LBS | HEART RATE: 78 BPM

## 2018-05-07 DIAGNOSIS — J20.9 ACUTE BRONCHITIS, UNSPECIFIED ORGANISM: Primary | ICD-10-CM

## 2018-05-07 DIAGNOSIS — R05.9 COUGH: ICD-10-CM

## 2018-05-07 DIAGNOSIS — R73.09 ELEVATED GLUCOSE LEVEL: ICD-10-CM

## 2018-05-07 PROCEDURE — 99214 OFFICE O/P EST MOD 30 MIN: CPT | Performed by: INTERNAL MEDICINE

## 2018-05-07 PROCEDURE — G0463 HOSPITAL OUTPT CLINIC VISIT: HCPCS | Performed by: INTERNAL MEDICINE

## 2018-05-07 RX ORDER — AZITHROMYCIN 250 MG/1
TABLET, FILM COATED ORAL
Qty: 6 TABLET | Refills: 0 | Status: SHIPPED | OUTPATIENT
Start: 2018-05-07 | End: 2018-05-18 | Stop reason: ALTCHOICE

## 2018-05-07 RX ORDER — BENZONATATE 100 MG/1
100 CAPSULE ORAL 3 TIMES DAILY PRN
Qty: 20 CAPSULE | Refills: 0 | Status: SHIPPED | OUTPATIENT
Start: 2018-05-07 | End: 2018-05-14

## 2018-05-10 NOTE — PATIENT INSTRUCTIONS
ASSESSMENT/PLAN:   Acute bronchitis, unspecified organism  (primary encounter diagnosis)will treat with ab , take as prescribed , let us know if not better   Cough- cough med, let us know if no tbetter   Elevated glucose level- will get a1c

## 2018-05-10 NOTE — PROGRESS NOTES
HPI:    Patient ID: Alex Roque is a 74year old female. HPI   Comes in with complaint of cough congestion mild shortness of breath going on for a week or so. Review of Systems   Constitutional: Negative. HENT: Positive for congestion.     Eyes: of Onset   • Diabetes Mother       Social History: Smoking status: Former Smoker                                                              Packs/day: 1.00      Years: 40.00        Quit date: 2007  Smokeless tobacco: Never Used                      Alcoh

## 2018-05-14 ENCOUNTER — TELEPHONE (OUTPATIENT)
Dept: INTERNAL MEDICINE CLINIC | Facility: CLINIC | Age: 68
End: 2018-05-14

## 2018-05-18 ENCOUNTER — OFFICE VISIT (OUTPATIENT)
Dept: INTERNAL MEDICINE CLINIC | Facility: CLINIC | Age: 68
End: 2018-05-18

## 2018-05-18 ENCOUNTER — HOSPITAL ENCOUNTER (OUTPATIENT)
Dept: GENERAL RADIOLOGY | Facility: HOSPITAL | Age: 68
Discharge: HOME | End: 2018-05-18
Attending: INTERNAL MEDICINE | Admitting: INTERNAL MEDICINE
Payer: MEDICARE

## 2018-05-18 VITALS
TEMPERATURE: 99 F | DIASTOLIC BLOOD PRESSURE: 61 MMHG | OXYGEN SATURATION: 98 % | WEIGHT: 188 LBS | HEART RATE: 58 BPM | SYSTOLIC BLOOD PRESSURE: 110 MMHG | BODY MASS INDEX: 34 KG/M2

## 2018-05-18 DIAGNOSIS — G89.29 CHRONIC LEFT-SIDED LOW BACK PAIN WITHOUT SCIATICA: ICD-10-CM

## 2018-05-18 DIAGNOSIS — R31.21 ASYMPTOMATIC MICROSCOPIC HEMATURIA: ICD-10-CM

## 2018-05-18 DIAGNOSIS — G47.33 OBSTRUCTIVE SLEEP APNEA SYNDROME: ICD-10-CM

## 2018-05-18 DIAGNOSIS — Z12.11 COLON CANCER SCREENING: Primary | ICD-10-CM

## 2018-05-18 DIAGNOSIS — R74.8 ELEVATED LIVER ENZYMES: ICD-10-CM

## 2018-05-18 DIAGNOSIS — E78.00 HIGH CHOLESTEROL: ICD-10-CM

## 2018-05-18 DIAGNOSIS — M54.50 CHRONIC LEFT-SIDED LOW BACK PAIN WITHOUT SCIATICA: ICD-10-CM

## 2018-05-18 DIAGNOSIS — R73.09 ELEVATED GLUCOSE LEVEL: ICD-10-CM

## 2018-05-18 PROCEDURE — 81003 URINALYSIS AUTO W/O SCOPE: CPT | Performed by: INTERNAL MEDICINE

## 2018-05-18 PROCEDURE — 99214 OFFICE O/P EST MOD 30 MIN: CPT | Performed by: INTERNAL MEDICINE

## 2018-05-18 PROCEDURE — 72100 X-RAY EXAM L-S SPINE 2/3 VWS: CPT | Performed by: INTERNAL MEDICINE

## 2018-05-18 PROCEDURE — G0463 HOSPITAL OUTPT CLINIC VISIT: HCPCS | Performed by: INTERNAL MEDICINE

## 2018-05-18 RX ORDER — AMITRIPTYLINE HYDROCHLORIDE 10 MG/1
10 TABLET, FILM COATED ORAL NIGHTLY
Qty: 30 TABLET | Refills: 1 | Status: SHIPPED | OUTPATIENT
Start: 2018-05-18 | End: 2018-07-27

## 2018-05-18 NOTE — PATIENT INSTRUCTIONS
ASSESSMENT/PLAN:   Colon cancer screening  (primary encounter diagnosis) F/U GI. Chronic left-sided low back pain without sciatica Check Xrays. Check urine. Try elavil 10 mg at night.  Discussed with patient of side effects and use of these med [address]:  1. PARA EL DOLOR DE JEROMY: Use daniel almohada cómoda que dé soporte a la david y mantenga la columna en daniel posición neutral. La david no debe estar inclinada hacia adelante ni hacia atrás.   PARA EL DOLOR DE ESPALDA: Es posibl posible que necesite fisioterapia o pruebas adicionales.   [NOTA: Si le hicieron radiografías, éstas serán examinadas por un radiólogo y le informarán de los nuevos hallazgos que puedan afectar la atención médica que necesita.]  Busque Prontamente Atención correcta de las vértebras y los discos; y también en el abdomen, las caderas y las piernas, para reducir el esfuerzo de la espalda.   La curva lumbar  La curva lumbar es la parte de la columna vertebral que más magdy trabaja, ya que es la que soporta más pes presenta más abajo). · Adopte posturas correctas al estar sentado, parado o caminando. Evite permanecer sentado yung períodos largos, ya que esto implica más esfuerzo en la parte inferior de la espalda que cuando está de pie o caminando.   · Póngase zap princess.   Técnica Para Levantar Objetos De Forma Rojas  · No doble la cintura para levantar objetos del suelo. En vez de hacer eso, agáchese doblando las rodillas y las caderas.   · Mantenga la espalda recta y la david erguida.   · Agarre el objeto cerca nuestro personal médico.  [NOTA: Si le hicieron radiografías, tomografías computarizadas o resonancias magnéticas, estas serán examinadas por un radiólogo y le informarán de los nuevos hallazgos que puedan afectar la atención médica que necesita.]  Reg[address] [place] y presionándolas contra el pecho al [facility] [address]  1. Extensión lumbar de rodillas: Comience en posición de cuatro patas.  Levante y enderece simultáneamente el Deysi Ramya completa). Mantenga la david alineada con el mya del cuerpo (no doble el lawanda hacia regis). Sostenga la posición por 2 segundos, luego baje lentamente. Date Last Reviewed: 6/1/2016  © 0621-5324 [employer] [address].  [address] sentarse o pararse para evitar los problemas que pueden aparecer cuando amanda está en cama mucho tiempo (debilidad de los músculos, mayor dolor y rigidez de la espalda, coágulos de peri en las piernas).   8. Mientras esté en cama, intente buscar daniel posició guerra hecho radiografías (X-rays), éstas serán evaluadas por un especialista.  Le informarán de los nuevos hallazgos que puedan afectar la atención médica que necesita.]  Busque Prontamente Atención Médica  si algo de lo siguiente ocurre:  · El dolor empeora ayudarle a controlar el dolor y la inflamación. Pruebe a kami aspirina o ibuprofeno. Ejercicio  La actividad física puede ayudar a mejorar mosquera espalda, así dalton a fortalecerla y darle mayor flexibilidad para prevenir nuevas lesiones.  Pida a mosquera médico que que un disco se debilite y sobresalga al punto de comprimir los nervios cercanos. Hay dos tipos comunes de hernias de disco:  Contenidas: el núcleo blando se ha desplazado hacia afuera.    Extruidas: el anillo firme se alexander desgarrado y ana que lo Te[place] de la [place] duele. · No se ponga hielo yung más de 20 minutos cada vez. · Deberá usar hielo varias veces al día.   ? Medicamentos  Los calmantes del dolor que se venden sin receta dalton la aspirina, el acetaminofeno y [facility] ibuprofeno, [place] soporta el peso de mosquera cuerpo todo el día, ya sea que usted esté durmiendo, de pie o inclinado.  Ciertas posiciones imponen mayores esfuerzos en la columna que otras; si usted mantiene la postura correcta en todas las posiciones, podrá aligerar la carga de s mucho.  · Trate de no excederse al comer. Aprenda sobre el tamaño de las porciones. Tenga en cuenta que el tamaño de cada porción depende del alimento y del marry alimenticio. Muchos alimentos indican el tamaño de las porciones en la etiqueta.   · Para trat lámina de cada vértebra forma la parte de atrás del canal leigh.  · A través del canal pasan los nervios. · Un foramen es un pequeño agujero por donde un nervio sale del canal leigh.  · Los discos sirven de almohadilla entre vértebras.  El núcleo bland no, acuéstese [employer] de tenis o pídale a un amigo que le presione el [place]. Aplique presión susan por 10–15 segundos, respirando hondo. Repita aury ejercicio varias veces.   · Masajee los puntos gatillo con hielo yung 2–5 minutos; aplique pr cerca del volante dalton para mantener las rodillas ligeramente dobladas. Para mayor comodidad, las rodillas deben estar a la altura de las caderas o un poco más abajo. Siéntese lo más derecho que pueda.  La curva de mosquera espalda lumbar debe estar completamente

## 2018-07-16 ENCOUNTER — TELEPHONE (OUTPATIENT)
Dept: INTERNAL MEDICINE CLINIC | Facility: CLINIC | Age: 68
End: 2018-07-16

## 2018-07-16 NOTE — TELEPHONE ENCOUNTER
Faxed CPAP/Bipap continued medicare coverage w/ last ov to [facility] home medical equipment [phone]

## 2018-07-27 RX ORDER — AMITRIPTYLINE HYDROCHLORIDE 10 MG/1
TABLET, FILM COATED ORAL
Qty: 30 TABLET | Refills: 1 | Status: SHIPPED | OUTPATIENT
Start: 2018-07-27 | End: 2018-08-31 | Stop reason: DRUGHIGH

## 2018-08-21 ENCOUNTER — TELEPHONE (OUTPATIENT)
Dept: INTERNAL MEDICINE CLINIC | Facility: CLINIC | Age: 68
End: 2018-08-21

## 2018-08-31 ENCOUNTER — TELEPHONE (OUTPATIENT)
Dept: INTERNAL MEDICINE CLINIC | Facility: CLINIC | Age: 68
End: 2018-08-31

## 2018-08-31 ENCOUNTER — OFFICE VISIT (OUTPATIENT)
Dept: INTERNAL MEDICINE CLINIC | Facility: CLINIC | Age: 68
End: 2018-08-31
Payer: MEDICARE

## 2018-08-31 VITALS
HEIGHT: 62 IN | OXYGEN SATURATION: 97 % | WEIGHT: 196 LBS | SYSTOLIC BLOOD PRESSURE: 108 MMHG | TEMPERATURE: 98 F | HEART RATE: 66 BPM | BODY MASS INDEX: 36.07 KG/M2 | DIASTOLIC BLOOD PRESSURE: 65 MMHG

## 2018-08-31 DIAGNOSIS — R73.9 HYPERGLYCEMIA: ICD-10-CM

## 2018-08-31 DIAGNOSIS — E78.00 HIGH CHOLESTEROL: ICD-10-CM

## 2018-08-31 DIAGNOSIS — E74.89 OTHER SPECIFIED DISORDERS OF CARBOHYDRATE METABOLISM (HCC): ICD-10-CM

## 2018-08-31 DIAGNOSIS — R51.9 HEADACHE AROUND THE EYES: Primary | ICD-10-CM

## 2018-08-31 DIAGNOSIS — G47.33 OBSTRUCTIVE SLEEP APNEA SYNDROME: Primary | ICD-10-CM

## 2018-08-31 DIAGNOSIS — G47.33 OBSTRUCTIVE SLEEP APNEA SYNDROME: ICD-10-CM

## 2018-08-31 LAB — ERYTHROCYTE [SEDIMENTATION RATE] IN BLOOD: 9 MM/HR (ref 0–30)

## 2018-08-31 PROCEDURE — 36415 COLL VENOUS BLD VENIPUNCTURE: CPT | Performed by: INTERNAL MEDICINE

## 2018-08-31 PROCEDURE — 94761 N-INVAS EAR/PLS OXIMETRY MLT: CPT | Performed by: INTERNAL MEDICINE

## 2018-08-31 PROCEDURE — 99215 OFFICE O/P EST HI 40 MIN: CPT | Performed by: INTERNAL MEDICINE

## 2018-08-31 RX ORDER — AMITRIPTYLINE HYDROCHLORIDE 10 MG/1
10 TABLET, FILM COATED ORAL
Qty: 30 TABLET | Refills: 1 | Status: CANCELLED | OUTPATIENT
Start: 2018-08-31

## 2018-08-31 RX ORDER — AMITRIPTYLINE HYDROCHLORIDE 25 MG/1
25 TABLET, FILM COATED ORAL NIGHTLY
Qty: 30 TABLET | Refills: 1 | Status: SHIPPED | OUTPATIENT
Start: 2018-08-31 | End: 2018-11-09

## 2018-08-31 NOTE — PATIENT INSTRUCTIONS
ASSESSMENT/PLAN:   Headache around the eyes  (primary encounter diagnosis) Try elavil 25 mg at night. Check blood. Check CT. Hyperglycemia Check blood. Obstructive sleep apnea syndrome ? Fit of mask. Have check overnight pulse ox.         Orders Julisa

## 2018-08-31 NOTE — PROGRESS NOTES
HPI:    Patient ID: Melba Madden is a 74year old female. HPI  Exercise level: somewhat active and has been following low salt diet. Weight has been stable.   Wt Readings from Last 3 Encounters:  08/31/18 : 196 lb (88.9 kg)  05/18/18 : 188 lb (85.3 kg Negative for myalgias, neck pain and neck stiffness. Skin: Negative for rash. Allergic/Immunologic: Negative for environmental allergies. Neurological: Positive for headaches (Decrease tyleonol. QAM when awakens. ).  Negative for dizziness, tremors, s BIOPSY LEFT   Family History   Problem Relation Age of Onset   • Diabetes Mother       Social History: Smoking status: Former Smoker                                                              Packs/day: 1.00      Years: 40.00        Quit date: 2007  Smok oropharyngeal erythema or tonsillar abscesses. Eyes: Conjunctivae and EOM are normal. Pupils are equal, round, and reactive to light. Right eye exhibits no chemosis, no discharge, no exudate and no hordeolum. No foreign body present in the right eye.  Lef adenopathy present. Right: No supraclavicular adenopathy present. Left: No supraclavicular adenopathy present. Neurological: She is alert and oriented to person, place, and time. She displays no tremor.  No cranial nerve deficit or sensory d

## 2018-09-01 LAB — HBA1C MFR BLD: 5.9 % (ref 4–6)

## 2018-09-03 PROBLEM — G47.9 DIFFICULTY SLEEPING: Status: ACTIVE | Noted: 2018-09-03

## 2018-09-03 PROBLEM — R73.03 PREDIABETES: Status: ACTIVE | Noted: 2018-09-03

## 2018-09-03 PROBLEM — K76.0 FATTY LIVER DISEASE, NONALCOHOLIC: Status: ACTIVE | Noted: 2017-11-01

## 2018-09-03 PROBLEM — K21.9 ACID REFLUX DISEASE: Status: ACTIVE | Noted: 2018-09-03

## 2018-09-03 PROBLEM — N63.0 BREAST MASS IN FEMALE: Status: ACTIVE | Noted: 2018-09-03

## 2018-09-06 ENCOUNTER — MED REC SCAN ONLY (OUTPATIENT)
Dept: INTERNAL MEDICINE CLINIC | Facility: CLINIC | Age: 68
End: 2018-09-06

## 2018-09-07 ENCOUNTER — HOSPITAL ENCOUNTER (OUTPATIENT)
Dept: CT IMAGING | Facility: HOSPITAL | Age: 68
Discharge: HOME | End: 2018-09-07
Attending: INTERNAL MEDICINE
Payer: MEDICARE

## 2018-09-07 DIAGNOSIS — R51.9 HEADACHE AROUND THE EYES: ICD-10-CM

## 2018-09-07 PROCEDURE — 70450 CT HEAD/BRAIN W/O DYE: CPT | Performed by: INTERNAL MEDICINE

## 2018-09-07 NOTE — TELEPHONE ENCOUNTER
Keshia Perera from [address] Medicare will not cover overnight pulse ox study for diagnosis of CRISSY. Would need to order pap titration study if you want to see if patient desaturate when wearing CPap while sleeping.    Overnight puls ox study is only

## 2018-09-07 NOTE — TELEPHONE ENCOUNTER
Do not have any other diagnoses may be just have her do with the CPAP titration with her own machine and mask and see how they do overnight if insurance will cover that way. See orders.

## 2018-09-14 ENCOUNTER — TELEPHONE (OUTPATIENT)
Dept: INTERNAL MEDICINE CLINIC | Facility: CLINIC | Age: 68
End: 2018-09-14

## 2018-09-14 NOTE — TELEPHONE ENCOUNTER
Pt said that during previous office visit she informed you that her cpap machine it's not  working well. When she has the mask on the air comes out and the machine makes a lot of noise.  Said that you instructed her to call and let you know if her cpap mach

## 2018-09-17 NOTE — TELEPHONE ENCOUNTER
Son was informed that pt needs to return to the sleep center for titration and also take her cpap machine with her. Son verbalized understanding, requested for referral to be mail home.

## 2018-09-24 RX ORDER — AMITRIPTYLINE HYDROCHLORIDE 10 MG/1
TABLET, FILM COATED ORAL
Qty: 30 TABLET | Refills: 0 | OUTPATIENT
Start: 2018-09-24

## 2018-09-25 ENCOUNTER — OFFICE VISIT (OUTPATIENT)
Dept: SLEEP CENTER | Age: 68
End: 2018-09-25
Attending: INTERNAL MEDICINE
Payer: MEDICARE

## 2018-09-25 DIAGNOSIS — Z76.89 SLEEP CONCERN: Primary | ICD-10-CM

## 2018-09-25 PROCEDURE — 95811 POLYSOM 6/>YRS CPAP 4/> PARM: CPT

## 2018-09-29 NOTE — PROCEDURES
[facility]  Accredited by the [place] of Sleep Medicine (AASM)    PATIENT'S NAME: Lowell Shahid   ATTENDING PHYSICIAN: Stefani Arrieta MD   REFERRING PHYSICIAN: Ann Guzmán.  Tobi Arrieta MD   PATIENT ACCOUNT #: [de-identified] LOCATION: [facility] [place] [place] is 4.5 events per hour, for a combined arousal index of 6.6 events per hour. There were no significant periodic limb movements.   The lowest desaturation was to 88%, the average heart rate was 63 beats per minute, and there was no significant bradycardia,

## 2018-10-05 ENCOUNTER — TELEPHONE (OUTPATIENT)
Dept: INTERNAL MEDICINE CLINIC | Facility: CLINIC | Age: 68
End: 2018-10-05

## 2018-10-10 NOTE — TELEPHONE ENCOUNTER
Can you please tell me what I should communicate to patient regarding this study? Results confusing to me.

## 2018-10-15 ENCOUNTER — TELEPHONE (OUTPATIENT)
Dept: INTERNAL MEDICINE CLINIC | Facility: CLINIC | Age: 68
End: 2018-10-15

## 2018-10-15 NOTE — TELEPHONE ENCOUNTER
Pt walked in to [facility] on Friday for test results. Message and instructions below were given to pt. Verbalized understanding.      Notes recorded by Margie Carroll MD on 9/30/2018 at 1:35 PM CDT  TERPRETATION:  A favorable response was Ibeth Arriaga

## 2018-10-15 NOTE — TELEPHONE ENCOUNTER
Compliance report and last office note dated for 08-31-18 was faxed to Yoselin Ruby from [place] [address]. Received confirmation report.

## 2018-10-18 NOTE — TELEPHONE ENCOUNTER
Dr. Magi Estrada, I spoke with Cheyenne Ho from [address] [place] [address] she said that the ov from 08/31/18 doesn't meet the insurance requirements. 1) Office note needs to state all the benefits she's having  from the machine.  2) Office note needs to state that compliance report ha

## 2018-10-22 ENCOUNTER — TELEPHONE (OUTPATIENT)
Dept: INTERNAL MEDICINE CLINIC | Facility: CLINIC | Age: 68
End: 2018-10-22

## 2018-10-22 NOTE — TELEPHONE ENCOUNTER
Can try sitz baths twice a day. Also try hydrocortisone 1% over-the-counter on the hemorrhoid or if she wants I can give her suppository. Most of the over-the-counter stuff does not work. Increase fluids and increase fiber make the stool softer.

## 2018-10-22 NOTE — TELEPHONE ENCOUNTER
Patient had a colonoscopy on Thursday  She now feels her Hemorids are inflamed and now she is itchy and irritated she is wondering if you can call something in for her .  Setswana speaking

## 2018-10-30 ENCOUNTER — TELEPHONE (OUTPATIENT)
Dept: INTERNAL MEDICINE CLINIC | Facility: CLINIC | Age: 68
End: 2018-10-30

## 2018-10-30 NOTE — TELEPHONE ENCOUNTER
Olga Phipps from [facility] they are in need of office visits   From 5- until today's date for c pap supply they are requesting and for medicare they need to provide notes

## 2018-11-09 ENCOUNTER — OFFICE VISIT (OUTPATIENT)
Dept: OBGYN CLINIC | Facility: CLINIC | Age: 68
End: 2018-11-09
Payer: MEDICARE

## 2018-11-09 VITALS
WEIGHT: 200 LBS | HEIGHT: 63 IN | BODY MASS INDEX: 35.44 KG/M2 | SYSTOLIC BLOOD PRESSURE: 128 MMHG | DIASTOLIC BLOOD PRESSURE: 72 MMHG

## 2018-11-09 DIAGNOSIS — Z12.31 SCREENING MAMMOGRAM, ENCOUNTER FOR: ICD-10-CM

## 2018-11-09 DIAGNOSIS — Z01.419 WELL WOMAN EXAM WITH ROUTINE GYNECOLOGICAL EXAM: Primary | ICD-10-CM

## 2018-11-09 PROBLEM — N63.0 BREAST MASS IN FEMALE: Status: RESOLVED | Noted: 2018-09-03 | Resolved: 2018-11-09

## 2018-11-09 PROBLEM — D25.0 SUBMUCOUS UTERINE FIBROID: Status: RESOLVED | Noted: 2017-12-04 | Resolved: 2018-11-09

## 2018-11-09 PROBLEM — N95.0 POSTMENOPAUSAL BLEEDING: Status: RESOLVED | Noted: 2017-11-30 | Resolved: 2018-11-09

## 2018-11-09 PROCEDURE — G0101 CA SCREEN;PELVIC/BREAST EXAM: HCPCS | Performed by: OBSTETRICS & GYNECOLOGY

## 2018-11-09 RX ORDER — AMITRIPTYLINE HYDROCHLORIDE 25 MG/1
TABLET, FILM COATED ORAL
Qty: 30 TABLET | Refills: 0 | Status: SHIPPED | OUTPATIENT
Start: 2018-11-09 | End: 2019-01-11 | Stop reason: DRUGHIGH

## 2018-11-09 NOTE — PROGRESS NOTES
HPI:    Patient ID: Ifrah Faustin is a 79year old female. HPI  Well woman visit  No complaints. No vaginal bleeding or spotting. Denies pelvic pain. Denies any breast or gynecologic problems.   Last year had postmenopausal bleeding related to a subm POSSIBLE BIOPSY, POSSIBLE POLYPECTOMY [address]792 N/A 10/18/2018    Performed by Yenni Phelps MD at [facility] N/A 12/4/2017    Performed by Matti [place] MD at [address] [place] [address]   • NEEDLE BIOPSY LEFT d'orange change. Nipples- without retraction or discharge. No masses, lumps, skin changes, erythema, or lesions. Axilla-  No adenopathy, mass, or tenderness. Genitourinary:   Pelvic exam was performed with patient supine and chaperone present.   Garrett Bañuelos

## 2018-11-14 ENCOUNTER — MED REC SCAN ONLY (OUTPATIENT)
Dept: INTERNAL MEDICINE CLINIC | Facility: CLINIC | Age: 68
End: 2018-11-14

## 2019-01-09 ENCOUNTER — HOSPITAL ENCOUNTER (OUTPATIENT)
Dept: MAMMOGRAPHY | Facility: HOSPITAL | Age: 69
Discharge: HOME | End: 2019-01-09
Attending: OBSTETRICS & GYNECOLOGY
Payer: MEDICARE

## 2019-01-09 DIAGNOSIS — Z12.31 SCREENING MAMMOGRAM, ENCOUNTER FOR: ICD-10-CM

## 2019-01-09 PROCEDURE — 77063 BREAST TOMOSYNTHESIS BI: CPT | Performed by: OBSTETRICS & GYNECOLOGY

## 2019-01-09 PROCEDURE — 77067 SCR MAMMO BI INCL CAD: CPT | Performed by: OBSTETRICS & GYNECOLOGY

## 2019-01-11 ENCOUNTER — TELEPHONE (OUTPATIENT)
Dept: INTERNAL MEDICINE CLINIC | Facility: CLINIC | Age: 69
End: 2019-01-11

## 2019-01-11 ENCOUNTER — OFFICE VISIT (OUTPATIENT)
Dept: INTERNAL MEDICINE CLINIC | Facility: CLINIC | Age: 69
End: 2019-01-11
Payer: MEDICARE

## 2019-01-11 VITALS
BODY MASS INDEX: 36.32 KG/M2 | HEART RATE: 59 BPM | TEMPERATURE: 98 F | HEIGHT: 63 IN | SYSTOLIC BLOOD PRESSURE: 129 MMHG | OXYGEN SATURATION: 95 % | WEIGHT: 205 LBS | DIASTOLIC BLOOD PRESSURE: 72 MMHG

## 2019-01-11 DIAGNOSIS — E78.00 HIGH CHOLESTEROL: Primary | ICD-10-CM

## 2019-01-11 DIAGNOSIS — G47.33 OBSTRUCTIVE SLEEP APNEA SYNDROME: ICD-10-CM

## 2019-01-11 DIAGNOSIS — R73.9 HYPERGLYCEMIA: ICD-10-CM

## 2019-01-11 DIAGNOSIS — R09.81 NASAL CONGESTION: ICD-10-CM

## 2019-01-11 PROCEDURE — 99214 OFFICE O/P EST MOD 30 MIN: CPT | Performed by: INTERNAL MEDICINE

## 2019-01-11 RX ORDER — AMITRIPTYLINE HYDROCHLORIDE 25 MG/1
TABLET, FILM COATED ORAL
Qty: 30 TABLET | Refills: 0 | Status: SHIPPED | OUTPATIENT
Start: 2019-01-11 | End: 2019-02-11

## 2019-01-11 RX ORDER — MONTELUKAST SODIUM 10 MG/1
10 TABLET ORAL NIGHTLY
Qty: 30 TABLET | Refills: 1 | Status: SHIPPED | OUTPATIENT
Start: 2019-01-11 | End: 2020-05-13 | Stop reason: ALTCHOICE

## 2019-01-11 NOTE — PROGRESS NOTES
HPI:    Patient ID: Alix Grace is a 79year old female. HPI   Headaches. Allergy tested in the past > 2 yrs. And was normal per pt. Going to return mask. Moves and awakens pt.       Exercise level: somewhat active or trying to do more and has been heat intolerance, polydipsia, polyphagia and polyuria. Genitourinary: Negative for dysuria. Musculoskeletal: Negative for myalgias and neck stiffness. Skin: Negative for rash. Allergic/Immunologic: Negative for environmental allergies.    Neurologic at [facility] MAIN OR   • NEEDLE BIOPSY LEFT        Family History   Problem Relation Age of Onset   • Diabetes Mother       Social History: Social History    Tobacco Use      Smoking status: Former Smoker        Packs/day: 1.00        Years: 40.00        Pack ye exudate, posterior oropharyngeal edema, posterior oropharyngeal erythema or tonsillar abscesses. Neck: Trachea normal and phonation normal. Neck supple. No thyroid mass and no thyromegaly present.    Cardiovascular: Normal rate, regular rhythm, S1 normal, blood.     Obstructive sleep apnea syndrome Call resp. Co. To check if different type of mask. Nasal congestion Try singuilar 10 mg at night. Discussed with patient of side effects and use of these medications.      Orders Placed This Encounter      Lip

## 2019-01-11 NOTE — PATIENT INSTRUCTIONS
ASSESSMENT/PLAN:   High cholesterol  (primary encounter diagnosis) Check blood. Obstructive sleep apnea syndrome Call resp. Co. To check if different type of mask. Nasal congestion Try singuilar 10 mg at night.  Discussed with patient of side effect

## 2019-01-13 ENCOUNTER — LAB ENCOUNTER (OUTPATIENT)
Dept: LAB | Facility: HOSPITAL | Age: 69
End: 2019-01-13
Attending: INTERNAL MEDICINE
Payer: MEDICARE

## 2019-01-13 DIAGNOSIS — E78.00 HIGH CHOLESTEROL: ICD-10-CM

## 2019-01-13 LAB
ALBUMIN SERPL BCP-MCNC: 3.7 G/DL (ref 3.5–4.8)
ALBUMIN/GLOB SERPL: 1.2 {RATIO} (ref 1–2)
ALP SERPL-CCNC: 75 U/L (ref 32–100)
ALT SERPL-CCNC: 44 U/L (ref 14–54)
ANION GAP SERPL CALC-SCNC: 9 MMOL/L (ref 0–18)
AST SERPL-CCNC: 37 U/L (ref 15–41)
BILIRUB SERPL-MCNC: 0.8 MG/DL (ref 0.3–1.2)
BUN SERPL-MCNC: 10 MG/DL (ref 8–20)
BUN/CREAT SERPL: 14.5 (ref 10–20)
CALCIUM SERPL-MCNC: 9.2 MG/DL (ref 8.5–10.5)
CHLORIDE SERPL-SCNC: 107 MMOL/L (ref 95–110)
CHOLEST SERPL-MCNC: 184 MG/DL (ref 110–200)
CO2 SERPL-SCNC: 25 MMOL/L (ref 22–32)
CREAT SERPL-MCNC: 0.69 MG/DL (ref 0.5–1.5)
GLOBULIN PLAS-MCNC: 3 G/DL (ref 2.5–3.7)
GLUCOSE SERPL-MCNC: 108 MG/DL (ref 70–99)
HDLC SERPL-MCNC: 46 MG/DL
LDLC SERPL CALC-MCNC: 97 MG/DL (ref 0–99)
NONHDLC SERPL-MCNC: 138 MG/DL
OSMOLALITY UR CALC.SUM OF ELEC: 292 MOSM/KG (ref 275–295)
PATIENT FASTING: YES
POTASSIUM SERPL-SCNC: 4.1 MMOL/L (ref 3.3–5.1)
PROT SERPL-MCNC: 6.7 G/DL (ref 5.9–8.4)
SODIUM SERPL-SCNC: 141 MMOL/L (ref 136–144)
TRIGL SERPL-MCNC: 207 MG/DL (ref 1–149)

## 2019-01-13 PROCEDURE — 36415 COLL VENOUS BLD VENIPUNCTURE: CPT

## 2019-01-13 PROCEDURE — 80053 COMPREHEN METABOLIC PANEL: CPT

## 2019-01-13 PROCEDURE — 80061 LIPID PANEL: CPT

## 2019-01-14 NOTE — PROGRESS NOTES
CMP Normal (electrolytes, sugar, kidney and liver functions),   Lipid (choilesterol) is good, triglycerides are high. Lower carb diet. Avoid red meat, shellfish, pork. Try not to lepe foods. Exercise at least 30 minutes to 45 minutes 3-4 times a week.

## 2019-01-15 NOTE — TELEPHONE ENCOUNTER
Called [facility] and spoke to March. Said that in April '18 she received a full mask and in Nov '18 she received a different mask which was the Nasal mask. Her last option now is a nasal pillow mask but she's not eligible until May 15 2019.      Please ad

## 2019-01-18 NOTE — TELEPHONE ENCOUNTER
Pt was informed, agreed to keep trying current mask and if she's still not comfortable will give us a call before May 15 to order a new mask.

## 2019-01-21 ENCOUNTER — MED REC SCAN ONLY (OUTPATIENT)
Dept: INTERNAL MEDICINE CLINIC | Facility: CLINIC | Age: 69
End: 2019-01-21

## 2019-02-11 RX ORDER — AMITRIPTYLINE HYDROCHLORIDE 25 MG/1
TABLET, FILM COATED ORAL
Qty: 30 TABLET | Refills: 1 | Status: SHIPPED | OUTPATIENT
Start: 2019-02-11 | End: 2019-04-08

## 2019-03-08 NOTE — TELEPHONE ENCOUNTER
If it is an issue with sleep it might not be the CPAP machine the CPAP machine is probably working for her she should probably call the company and have them come by and check if the machine is working.   Company has tried several masks and several machines

## 2019-03-08 NOTE — TELEPHONE ENCOUNTER
Pt walked in to let us know that c-pap is not working for her , pt would like to ask Dr Johana Espinosa to please order her the new machine

## 2019-04-08 RX ORDER — AMITRIPTYLINE HYDROCHLORIDE 25 MG/1
TABLET, FILM COATED ORAL
Qty: 30 TABLET | Refills: 1 | Status: SHIPPED | OUTPATIENT
Start: 2019-04-08 | End: 2019-04-19 | Stop reason: DRUGHIGH

## 2019-04-19 ENCOUNTER — OFFICE VISIT (OUTPATIENT)
Dept: INTERNAL MEDICINE CLINIC | Facility: CLINIC | Age: 69
End: 2019-04-19
Payer: MEDICARE

## 2019-04-19 VITALS
BODY MASS INDEX: 35.97 KG/M2 | HEART RATE: 73 BPM | HEIGHT: 63 IN | RESPIRATION RATE: 18 BRPM | WEIGHT: 203 LBS | SYSTOLIC BLOOD PRESSURE: 114 MMHG | TEMPERATURE: 98 F | DIASTOLIC BLOOD PRESSURE: 73 MMHG

## 2019-04-19 DIAGNOSIS — G47.33 OBSTRUCTIVE SLEEP APNEA SYNDROME: ICD-10-CM

## 2019-04-19 DIAGNOSIS — E78.00 HIGH CHOLESTEROL: Primary | ICD-10-CM

## 2019-04-19 PROCEDURE — 99214 OFFICE O/P EST MOD 30 MIN: CPT | Performed by: INTERNAL MEDICINE

## 2019-04-19 RX ORDER — AMITRIPTYLINE HYDROCHLORIDE 50 MG/1
50 TABLET, FILM COATED ORAL NIGHTLY
Qty: 30 TABLET | Refills: 1 | Status: SHIPPED | OUTPATIENT
Start: 2019-04-19 | End: 2019-05-24 | Stop reason: DRUGHIGH

## 2019-04-19 NOTE — PATIENT INSTRUCTIONS
ASSESSMENT/PLAN:   High cholesterol  (primary encounter diagnosis) Check blood. Obstructive sleep apnea syndrome Check with insurance about mask and can try tape. Increase elavil to 50 mg at night. Call in 2 weeks.      No orders of the defined types

## 2019-04-19 NOTE — PROGRESS NOTES
HPI:    Patient ID: Beverley Hess is a 79year old female. HPI   Mask used but now moves and does not stay on face. Has to pour oxygen into machine qday. Correa snot seem to last. States her daughter wants her to be on [place].      Hypertension  Patient is Psychiatric/Behavioral: Positive for sleep disturbance. All other systems reviewed and are negative. Current Outpatient Medications:  Amitriptyline HCl 50 MG Oral Tab Take 1 tablet (50 mg total) by mouth nightly.  Disp: 30 tablet Rfl: 1   hydro Packs/day: 1.00        Years: 40.00        Pack years: 36        Quit date:         Years since quittin.3      Smokeless tobacco: Never Used    Alcohol use: No    Drug use: No       PHYSICAL EXAM:    Physical Exam   Constitutional: She is oriented Visit:  Requested Prescriptions     Signed Prescriptions Disp Refills   • Amitriptyline HCl 50 MG Oral Tab 30 tablet 1     Sig: Take 1 tablet (50 mg total) by mouth nightly.    • hydrocortisone (ANUSOL-HC) 2.5 % Rectal Cream 28.35 g 1     Sig: Place 1 Appli

## 2019-04-20 ENCOUNTER — LAB ENCOUNTER (OUTPATIENT)
Dept: LAB | Facility: HOSPITAL | Age: 69
End: 2019-04-20
Attending: INTERNAL MEDICINE
Payer: MEDICARE

## 2019-04-20 DIAGNOSIS — E78.00 HIGH CHOLESTEROL: ICD-10-CM

## 2019-04-20 PROCEDURE — 36415 COLL VENOUS BLD VENIPUNCTURE: CPT

## 2019-04-20 PROCEDURE — 80061 LIPID PANEL: CPT

## 2019-04-20 PROCEDURE — 80053 COMPREHEN METABOLIC PANEL: CPT

## 2019-05-20 ENCOUNTER — TELEPHONE (OUTPATIENT)
Dept: INTERNAL MEDICINE CLINIC | Facility: CLINIC | Age: 69
End: 2019-05-20

## 2019-05-20 NOTE — TELEPHONE ENCOUNTER
Per pharmacy insurance is not covering the amitriptyline 50 mg. We need to get prior off. Pharmacy phone number is [phone].

## 2019-05-24 ENCOUNTER — TELEPHONE (OUTPATIENT)
Dept: INTERNAL MEDICINE CLINIC | Facility: CLINIC | Age: 69
End: 2019-05-24

## 2019-05-24 ENCOUNTER — OFFICE VISIT (OUTPATIENT)
Dept: INTERNAL MEDICINE CLINIC | Facility: CLINIC | Age: 69
End: 2019-05-24
Payer: MEDICARE

## 2019-05-24 VITALS
OXYGEN SATURATION: 97 % | HEIGHT: 63 IN | DIASTOLIC BLOOD PRESSURE: 61 MMHG | WEIGHT: 205 LBS | BODY MASS INDEX: 36.32 KG/M2 | HEART RATE: 77 BPM | TEMPERATURE: 99 F | SYSTOLIC BLOOD PRESSURE: 127 MMHG

## 2019-05-24 DIAGNOSIS — D22.9 ATYPICAL NEVI: ICD-10-CM

## 2019-05-24 DIAGNOSIS — E78.00 HIGH CHOLESTEROL: ICD-10-CM

## 2019-05-24 DIAGNOSIS — G47.33 OBSTRUCTIVE SLEEP APNEA SYNDROME: Primary | ICD-10-CM

## 2019-05-24 PROCEDURE — 94761 N-INVAS EAR/PLS OXIMETRY MLT: CPT | Performed by: INTERNAL MEDICINE

## 2019-05-24 PROCEDURE — 99214 OFFICE O/P EST MOD 30 MIN: CPT | Performed by: INTERNAL MEDICINE

## 2019-05-24 RX ORDER — AMITRIPTYLINE HYDROCHLORIDE 75 MG/1
75 TABLET, FILM COATED ORAL NIGHTLY
Qty: 30 TABLET | Refills: 2 | Status: SHIPPED | OUTPATIENT
Start: 2019-05-24 | End: 2020-05-13

## 2019-05-24 RX ORDER — FENOFIBRATE 134 MG/1
134 CAPSULE ORAL
Qty: 90 CAPSULE | Refills: 1 | Status: SHIPPED | OUTPATIENT
Start: 2019-05-24 | End: 2020-05-13

## 2019-05-24 NOTE — PATIENT INSTRUCTIONS
ASSESSMENT/PLAN:   High cholesterol Doing good. Obstructive sleep apnea syndrome  (primary encounter diagnosis) Check machine. Check overnight pulse ox. Increase elavil 75 mg at night. Atypical nevi R side. Dermatology.      Orders Placed This En ivy.  · Diámetro: el lunar mide más de 6 mm de diámetro (el tamaño de un borrador [facility]one). · Evolución: el lunar está creciendo o cambiando de forma o color.   Cómo revisar  Póngase de pie frente a un katy de cuerpo entero y revise todas las partes de

## 2019-05-24 NOTE — PROGRESS NOTES
Chief Complaint   Patient presents with   • Wrist Pain     right       HISTORY OF PRESENT ILLNESS:  This is a 14 year old right handed female who is here today to evaluate right wrist pain with her mother. Her wrist started hurting a month ago. She denies any injury but wonders if she maybe injured it during tumbling in gym class. She has pain in her medial wrist and over her distal ulna. She has increased pain with movement and writing. She denies any numbness or tingling. She has tried a wrap with temporary improvement in symptoms. She has trouble swallowing pills.     The following portions of the patient's history were reviewed including the nurses notes and the following were updated as appropriate: allergies, current medications, past family history, past medical history, past social history and past surgical history.    PHYSICAL EXAM:  Visit Vitals  /68 (BP [place]: [facility] [place] Position: Sitting, Cuff Size: Regular)   Pulse 70   Temp 97.9 °F (36.6 °C) (Tympanic)   Resp 14   Ht 5' (1.524 m)   Wt 59.8 kg   LMP 12/18/2017 (Approximate)   BMI 25.74 kg/m²     CONSTITUTIONAL: Alert and oriented x3. Appears well and is in no apparent distress.  Right wrist: Tenderness over dorsal aspect of wrist medial and distal to ulna. No tenderness over fingers, hand, or radius. Extension with resistance produces pain. Flexion full. Slight pain with active supination. Pronation full. Strength in wrist, hands, and fingers intact. Pulse +2.     ASSESSMENT:  1. Right wrist pain    2. Need for vaccination        PLAN:  Most likely a sprain vs tendonitis of the right wrist. Continue with a daily anti-inflammatory. Xray ordered to rule out hairline fracture. Patient referred to PT has her pain has not improved over the past month. Flu shot also given. Follow up if symptoms fail to improve or if new symptoms develop.     Orders Placed This Encounter   • XR Wrist 3+ View Right   • INFLUENZA QUADRIVALENT SPLIT PRES FREE 0.5 ML  HPI:    Patient ID: Zohreh Galeana is a 79year old female. HTN   Pertinent negatives include no chest pain, headaches, palpitations or shortness of breath. Exercise level: trying to do more and has been following low salt diet.   Weight has been st Gastrointestinal: Negative for abdominal distention and abdominal pain. Endocrine: Negative for cold intolerance, heat intolerance, polydipsia, polyphagia and polyuria. Genitourinary: Negative for dysuria.    Skin: Positive for rash (R face when sweats VACC, IM   • SERVICE TO PHYSICAL THERAPY       Supervising MD: Dr. Rivera     • Fatty liver disease, nonalcoholic 70/3091   • Pneumonia due to organism 2016   • Postmenopausal bleeding 11/30/2017   • Prediabetes    • Submucous uterine fibroid 12/4/2017   • Visual impairment     glasses      Past Surgical History:   Procedure Lateral Left Ear: Tympanic membrane, external ear and ear canal normal. No lacerations. No drainage, swelling or tenderness. No foreign bodies. No mastoid tenderness.  Tympanic membrane is not injected, not scarred, not perforated, not erythematous, not retracted a Abdominal: Soft. There is no tenderness. Musculoskeletal: She exhibits no edema. Lymphadenopathy:        Head (right side): No submental, no submandibular, no preauricular, no posterior auricular and no occipital adenopathy present.         Head (left s

## 2019-05-28 ENCOUNTER — TELEPHONE (OUTPATIENT)
Dept: INTERNAL MEDICINE CLINIC | Facility: CLINIC | Age: 69
End: 2019-05-28

## 2019-05-28 NOTE — TELEPHONE ENCOUNTER
Pt called to complain that since she started seeing Dr. Miller Sparks she has been paying too much money for her prescriptions, pt states she never had to pay that much before.  Pt. States that her pharmacy has been faxing forms for PA for Nortriptyline and our of

## 2019-05-30 NOTE — TELEPHONE ENCOUNTER
Is done generic. So it is not my thing that her prices have gone up on her prescriptions probably she have to take that up with her insurance. She may be in the donut hole where insurance will not cover anything.   [place] is always generic and is been on

## 2019-06-03 ENCOUNTER — TELEPHONE (OUTPATIENT)
Dept: INTERNAL MEDICINE CLINIC | Facility: CLINIC | Age: 69
End: 2019-06-03

## 2019-06-03 RX ORDER — AMITRIPTYLINE HYDROCHLORIDE 75 MG/1
TABLET ORAL
Qty: 90 TABLET | Refills: 1 | Status: SHIPPED | OUTPATIENT
Start: 2019-06-03 | End: 2019-06-09

## 2019-06-03 NOTE — TELEPHONE ENCOUNTER
Spoke to RP: Emmanuel Girard   Asking for PA to be done for patients Amitriptyline 75mg.    [employer]  [phone]  ID# [id number]

## 2019-06-03 NOTE — TELEPHONE ENCOUNTER
PA for Amitriptyline HCl 75 mg tab completed with [facility] via [employer] Medication approved effective 5/4/2019-6/2/2020 case# [id number].  Spoke with [place] pharmacist Radha Rodriguez and informed of the approval.

## 2019-06-03 NOTE — TELEPHONE ENCOUNTER
Refill passed per [facility] protocol.   Medication not on med list - Amitriptyline listed is 75 mg

## 2019-06-04 NOTE — TELEPHONE ENCOUNTER
Spoke with [facility] and they state that the machine has been paid for and that it belongs to the pt so she does not need to return it.  Spoke with pt's son and related this information to him, son feels that pt should continue using the CPAP machine ting

## 2019-06-09 ENCOUNTER — TELEPHONE (OUTPATIENT)
Dept: INTERNAL MEDICINE CLINIC | Facility: CLINIC | Age: 69
End: 2019-06-09

## 2019-06-09 RX ORDER — AMITRIPTYLINE HYDROCHLORIDE 75 MG/1
TABLET, FILM COATED ORAL
Qty: 30 TABLET | Refills: 2 | Status: SHIPPED | OUTPATIENT
Start: 2019-06-09 | End: 2019-06-14

## 2019-06-14 ENCOUNTER — OFFICE VISIT (OUTPATIENT)
Dept: INTERNAL MEDICINE CLINIC | Facility: CLINIC | Age: 69
End: 2019-06-14
Payer: MEDICARE

## 2019-06-14 VITALS
BODY MASS INDEX: 36 KG/M2 | WEIGHT: 206 LBS | SYSTOLIC BLOOD PRESSURE: 129 MMHG | TEMPERATURE: 98 F | DIASTOLIC BLOOD PRESSURE: 74 MMHG | OXYGEN SATURATION: 96 % | HEART RATE: 80 BPM

## 2019-06-14 DIAGNOSIS — R04.0 EPISTAXIS: Primary | ICD-10-CM

## 2019-06-14 DIAGNOSIS — Z91.09 OTHER ALLERGY STATUS, OTHER THAN TO DRUGS AND BIOLOGICAL SUBSTANCES: ICD-10-CM

## 2019-06-14 PROCEDURE — 36415 COLL VENOUS BLD VENIPUNCTURE: CPT | Performed by: INTERNAL MEDICINE

## 2019-06-14 PROCEDURE — G0463 HOSPITAL OUTPT CLINIC VISIT: HCPCS | Performed by: INTERNAL MEDICINE

## 2019-06-14 PROCEDURE — 99214 OFFICE O/P EST MOD 30 MIN: CPT | Performed by: INTERNAL MEDICINE

## 2019-06-14 RX ORDER — MOMETASONE 50 UG/1
1 SPRAY, METERED NASAL DAILY
Qty: 1 BOTTLE | Refills: 1 | Status: SHIPPED | OUTPATIENT
Start: 2019-06-14 | End: 2019-06-17

## 2019-06-14 NOTE — PATIENT INSTRUCTIONS
ASSESSMENT/PLAN:   Epistaxis  (primary encounter diagnosis) heck blood. May be from dry nose. Try nasal spray with head tilted back and do not inhale. Call if no better in 1 week. May also explain the dry cough. May be from postnasal drip.     Oth

## 2019-06-14 NOTE — PROGRESS NOTES
HPI:    Patient ID: Tasia Villa is a 79year old female. HPI   Received patch from allergies from dermatology. 5 yrs. ago. Never received shots. Exercise level: somewhat active and has been following low salt diet. Weight has been stable.   Wt R and abdominal pain. Endocrine: Negative for cold intolerance, heat intolerance, polydipsia, polyphagia and polyuria. Genitourinary: Negative for dysuria. Skin: Negative for rash. Allergic/Immunologic: Negative for environmental allergies.    Neurolo from breast two times Benign.     • COLONOSCOPY  10/2018   • COLONOSCOPY, POSSIBLE BIOPSY, POSSIBLE POLYPECTOMY 93027 N/A 10/18/2018    Performed by Nick Leyva MD at [address] [place] [address] N/A 12/4/2017    Pe Right sinus exhibits no maxillary sinus tenderness and no frontal sinus tenderness. Left sinus exhibits no maxillary sinus tenderness and no frontal sinus tenderness.    Mouth/Throat: Uvula is midline, oropharynx is clear and moist and mucous membranes are superficial cervical, no deep cervical and no posterior cervical adenopathy present. Right: No supraclavicular adenopathy present. Left: No supraclavicular adenopathy present.    Neurological: She is alert and oriented to person, place, and ti

## 2019-06-17 ENCOUNTER — TELEPHONE (OUTPATIENT)
Dept: INTERNAL MEDICINE CLINIC | Facility: CLINIC | Age: 69
End: 2019-06-17

## 2019-06-17 RX ORDER — FLUNISOLIDE 0.25 MG/ML
2 SOLUTION NASAL 2 TIMES DAILY
Qty: 1 INHALER | Refills: 1 | Status: SHIPPED | OUTPATIENT
Start: 2019-06-17

## 2019-06-17 RX ORDER — FLUTICASONE PROPIONATE 50 MCG
2 SPRAY, SUSPENSION (ML) NASAL DAILY
Qty: 1 BOTTLE | Refills: 3 | Status: SHIPPED | OUTPATIENT
Start: 2019-06-17 | End: 2019-06-17 | Stop reason: ALTCHOICE

## 2019-06-17 NOTE — TELEPHONE ENCOUNTER
Per patients insurance, fluticasone 50 mcg is covered and is the most cost effective. Please reorder.

## 2019-06-17 NOTE — TELEPHONE ENCOUNTER
Flunisolide nasal spray 0.025% also covered, a little more costly than fluticasone, but still covered.

## 2019-06-17 NOTE — TELEPHONE ENCOUNTER
He had some problems with nosebleeds with the fluticasone. If that is only one cover with and explained to the patient there is a potential still have nosebleeds otherwise more the glycerin-based or better.   If she has a nosebleed which is and if she can

## 2019-06-24 ENCOUNTER — OFFICE VISIT (OUTPATIENT)
Dept: INTERNAL MEDICINE CLINIC | Facility: CLINIC | Age: 69
End: 2019-06-24
Payer: MEDICARE

## 2019-06-24 ENCOUNTER — TELEPHONE (OUTPATIENT)
Dept: INTERNAL MEDICINE CLINIC | Facility: CLINIC | Age: 69
End: 2019-06-24

## 2019-06-24 VITALS
HEART RATE: 76 BPM | RESPIRATION RATE: 18 BRPM | DIASTOLIC BLOOD PRESSURE: 77 MMHG | SYSTOLIC BLOOD PRESSURE: 131 MMHG | HEIGHT: 63 IN | WEIGHT: 206 LBS | TEMPERATURE: 98 F | BODY MASS INDEX: 36.5 KG/M2

## 2019-06-24 DIAGNOSIS — R05.9 COUGH: ICD-10-CM

## 2019-06-24 DIAGNOSIS — J20.9 ACUTE BRONCHITIS, UNSPECIFIED ORGANISM: Primary | ICD-10-CM

## 2019-06-24 PROCEDURE — 99214 OFFICE O/P EST MOD 30 MIN: CPT | Performed by: INTERNAL MEDICINE

## 2019-06-24 RX ORDER — BENZONATATE 100 MG/1
100 CAPSULE ORAL 3 TIMES DAILY PRN
Qty: 20 CAPSULE | Refills: 0 | Status: SHIPPED | OUTPATIENT
Start: 2019-06-24 | End: 2019-07-01

## 2019-06-24 RX ORDER — AZITHROMYCIN 250 MG/1
TABLET, FILM COATED ORAL
Qty: 6 TABLET | Refills: 0 | Status: SHIPPED | OUTPATIENT
Start: 2019-06-24 | End: 2019-10-17

## 2019-06-24 NOTE — PROGRESS NOTES
HPI:    Patient ID: Angie Grace is a 79year old female. HPI  Today with complaint of cough congestion phlegm coming up green-yellow some chest pain with coughing patient was a smoker but quit about 10 years ago but she smoked for many years.   Review Medical History:   Diagnosis Date   • Acid reflux disease    • Breast mass in female    • Difficulty sleeping    • Elevated liver enzymes 10/2017   • Fatty liver disease, nonalcoholic 23/4159   • Pneumonia due to organism 2016   • Postmenopausal bleeding 1 patient with long history of smoking most likely has some underlying disease with cough congestion phlegm will treat with Z-Wes take as prescribed let us know if not better  Cough we will add cough medicine    No orders of the defined types were placed in

## 2019-06-24 NOTE — TELEPHONE ENCOUNTER
Had moderate amount of time that her oxygen level did drop. She does not like and does not feel like the machine is working. May be to have her see the pulmonologist Dr. Gary Whaley.

## 2019-06-26 NOTE — TELEPHONE ENCOUNTER
Called patient, no answer. Left VM informing patient of sleep study results and to make f/u appt with Dr Ellie Hu, left his office phone number to make appt and to give us a call back if she has any other questions.

## 2019-06-27 ENCOUNTER — TELEPHONE (OUTPATIENT)
Dept: INTERNAL MEDICINE CLINIC | Facility: CLINIC | Age: 69
End: 2019-06-27

## 2019-06-27 RX ORDER — LEVOFLOXACIN 500 MG/1
500 TABLET, FILM COATED ORAL DAILY
Qty: 10 TABLET | Refills: 0 | Status: SHIPPED | OUTPATIENT
Start: 2019-06-27 | End: 2019-07-07

## 2019-06-27 NOTE — TELEPHONE ENCOUNTER
Ok we can change the AB to Levaquine for 10 days also can take Mucinex over the counter to help with expectorating the phlegm

## 2019-06-27 NOTE — TELEPHONE ENCOUNTER
Called patient, no answer. Left VM informing patient about change in abx and advised her to take mucinex to help with the phlegm. Any questions or if still not feeling better to give us a call back.

## 2019-06-27 NOTE — TELEPHONE ENCOUNTER
Patient continues with a lot of cough and phlegm she is unable to bring it up .  Feels horrible the medication is not working  What else can she do ?or take

## 2019-09-20 ENCOUNTER — MED REC SCAN ONLY (OUTPATIENT)
Dept: INTERNAL MEDICINE CLINIC | Facility: CLINIC | Age: 69
End: 2019-09-20

## 2019-09-20 ENCOUNTER — OFFICE VISIT (OUTPATIENT)
Dept: INTERNAL MEDICINE CLINIC | Facility: CLINIC | Age: 69
End: 2019-09-20
Payer: MEDICARE

## 2019-09-20 VITALS
TEMPERATURE: 98 F | DIASTOLIC BLOOD PRESSURE: 76 MMHG | OXYGEN SATURATION: 98 % | BODY MASS INDEX: 37 KG/M2 | SYSTOLIC BLOOD PRESSURE: 137 MMHG | WEIGHT: 209 LBS | HEART RATE: 79 BPM

## 2019-09-20 DIAGNOSIS — R10.13 EPIGASTRIC PAIN: ICD-10-CM

## 2019-09-20 DIAGNOSIS — Z00.00 ENCOUNTER FOR ANNUAL HEALTH EXAMINATION: Primary | ICD-10-CM

## 2019-09-20 LAB
ALBUMIN SERPL-MCNC: 3.9 G/DL (ref 3.4–5)
ALBUMIN/GLOB SERPL: 1.1 {RATIO} (ref 1–2)
ALP LIVER SERPL-CCNC: 80 U/L (ref 55–142)
ALT SERPL-CCNC: 44 U/L (ref 13–56)
AMYLASE SERPL-CCNC: 60 U/L (ref 25–115)
ANION GAP SERPL CALC-SCNC: 6 MMOL/L (ref 0–18)
APPEARANCE: CLEAR
AST SERPL-CCNC: 34 U/L (ref 15–37)
BASOPHILS # BLD AUTO: 0.08 X10(3) UL (ref 0–0.2)
BASOPHILS NFR BLD AUTO: 1.1 %
BILIRUB SERPL-MCNC: 0.4 MG/DL (ref 0.1–2)
BILIRUBIN: NEGATIVE
BUN BLD-MCNC: 11 MG/DL (ref 7–18)
BUN/CREAT SERPL: 14.1 (ref 10–20)
CALCIUM BLD-MCNC: 9.2 MG/DL (ref 8.5–10.1)
CHLORIDE SERPL-SCNC: 108 MMOL/L (ref 98–112)
CO2 SERPL-SCNC: 28 MMOL/L (ref 21–32)
CREAT BLD-MCNC: 0.78 MG/DL (ref 0.55–1.02)
DEPRECATED RDW RBC AUTO: 43.8 FL (ref 35.1–46.3)
EOSINOPHIL # BLD AUTO: 0.36 X10(3) UL (ref 0–0.7)
EOSINOPHIL NFR BLD AUTO: 4.8 %
ERYTHROCYTE [DISTWIDTH] IN BLOOD BY AUTOMATED COUNT: 13.7 % (ref 11–15)
GLOBULIN PLAS-MCNC: 3.6 G/DL (ref 2.8–4.4)
GLUCOSE (URINE DIPSTICK): NEGATIVE MG/DL
GLUCOSE BLD-MCNC: 95 MG/DL (ref 70–99)
HCT VFR BLD AUTO: 42.6 % (ref 35–48)
HGB BLD-MCNC: 13.6 G/DL (ref 12–16)
IMM GRANULOCYTES # BLD AUTO: 0.02 X10(3) UL (ref 0–1)
IMM GRANULOCYTES NFR BLD: 0.3 %
KETONES (URINE DIPSTICK): NEGATIVE MG/DL
LEUKOCYTES: NEGATIVE
LIPASE SERPL-CCNC: 109 U/L (ref 73–393)
LYMPHOCYTES # BLD AUTO: 3.03 X10(3) UL (ref 1–4)
LYMPHOCYTES NFR BLD AUTO: 40.7 %
M PROTEIN MFR SERPL ELPH: 7.5 G/DL (ref 6.4–8.2)
MCH RBC QN AUTO: 27.9 PG (ref 26–34)
MCHC RBC AUTO-ENTMCNC: 31.9 G/DL (ref 31–37)
MCV RBC AUTO: 87.3 FL (ref 80–100)
MONOCYTES # BLD AUTO: 0.67 X10(3) UL (ref 0.1–1)
MONOCYTES NFR BLD AUTO: 9 %
MULTISTIX LOT#: NORMAL NUMERIC
NEUTROPHILS # BLD AUTO: 3.29 X10 (3) UL (ref 1.5–7.7)
NEUTROPHILS # BLD AUTO: 3.29 X10(3) UL (ref 1.5–7.7)
NEUTROPHILS NFR BLD AUTO: 44.1 %
NITRITE, URINE: NEGATIVE
OCCULT BLOOD: NEGATIVE
OSMOLALITY SERPL CALC.SUM OF ELEC: 293 MOSM/KG (ref 275–295)
PATIENT FASTING: YES
PH, URINE: 8 (ref 4.5–8)
PLATELET # BLD AUTO: 337 10(3)UL (ref 150–450)
POTASSIUM SERPL-SCNC: 4.3 MMOL/L (ref 3.5–5.1)
PROTEIN (URINE DIPSTICK): NEGATIVE MG/DL
RBC # BLD AUTO: 4.88 X10(6)UL (ref 3.8–5.3)
SODIUM SERPL-SCNC: 142 MMOL/L (ref 136–145)
SPECIFIC GRAVITY: 1.01 (ref 1–1.03)
URINE-COLOR: YELLOW
UROBILINOGEN,SEMI-QN: 0.2 MG/DL (ref 0–1.9)
WBC # BLD AUTO: 7.5 X10(3) UL (ref 4–11)

## 2019-09-20 PROCEDURE — 99213 OFFICE O/P EST LOW 20 MIN: CPT | Performed by: INTERNAL MEDICINE

## 2019-09-20 PROCEDURE — G0008 ADMIN INFLUENZA VIRUS VAC: HCPCS | Performed by: INTERNAL MEDICINE

## 2019-09-20 PROCEDURE — 81003 URINALYSIS AUTO W/O SCOPE: CPT | Performed by: INTERNAL MEDICINE

## 2019-09-20 PROCEDURE — 99497 ADVNCD CARE PLAN 30 MIN: CPT | Performed by: INTERNAL MEDICINE

## 2019-09-20 PROCEDURE — 36415 COLL VENOUS BLD VENIPUNCTURE: CPT | Performed by: INTERNAL MEDICINE

## 2019-09-20 PROCEDURE — G0439 PPPS, SUBSEQ VISIT: HCPCS | Performed by: INTERNAL MEDICINE

## 2019-09-20 PROCEDURE — 90662 IIV NO PRSV INCREASED AG IM: CPT | Performed by: INTERNAL MEDICINE

## 2019-09-20 NOTE — PATIENT INSTRUCTIONS
ASSESSMENT AND OTHER RELEVANT CHRONIC CONDITIONS:   Gary Murillo is a 79year old female who presents for a Medicare Assessment.      PLAN SUMMARY:   Diagnoses and all orders for this visit:    Encounter for annual health examination  -     ADVANCE CARE non-screening if indicated for medical reasons Electrocardiogram date10/13/2017 Routine EKG is not a screening covered service except at the [place] to Medicare Visit    Abdominal aortic aneurysm screening (once between ages 73-68) IPPE only No results fou this patient. Chlamydia  Annually if high risk No results found for: CHLAMYDIA No flowsheet data found.     Screening Mammogram      Mammogram    Recommend Annually to at least age 76, and as needed after 76 Mammogram due on 01/09/2020 Please get this M also has the State forms available on it's website for anyone to review and print using their home computer and printer. (the forms are also available in [address])  [email or web address] org  This link also has information from the [place]

## 2019-09-20 NOTE — PROGRESS NOTES
HPI:    Patient ID: Akbar Hidalgo is a 79year old female. HPI   Akbar Hidalgo is a 79year old female who presents for a complete physical exam.   HPI:   Patient presents with:   Well Adult: medicare annual     Eye exam done 1 yr ago and full eye exam External Cream qam. Disp: 207 g Rfl: 1   fenofibrate micronized 134 MG Oral Cap Take 1 capsule (134 mg total) by mouth daily with breakfast. Disp: 90 capsule Rfl: 1   Amitriptyline HCl 75 MG Oral Tab Take 1 tablet (75 mg total) by mouth nightly.  Disp: 30 t Occupational History      Occupation: Retired from [employer].      Tobacco Use      Smoking status: Former Smoker        Packs/day: 1.00        Years: 40.00        Pack years: 36        Quit date:         Years since quittin.7      Smokel mouth sores, nosebleeds, postnasal drip, rhinorrhea, sinus pressure, sinus pain, sneezing, sore throat, tinnitus, trouble swallowing and voice change. Ears itching.     Eyes: Negative for photophobia, pain, discharge, redness, itching and visual dis Flunisolide 25 MCG/ACT (0.025%) Nasal Solution 2 sprays by Each Nare route 2 (two) times daily.  Disp: 1 Inhaler Rfl: 1   Colloidal Oatmeal (EUCERIN ECZEMA RELIEF) 1 % External Cream qam. Disp: 207 g Rfl: 1   fenofibrate micronized 134 MG Oral Cap Take 1 COLONOSCOPY  10/2018   • COLONOSCOPY, POSSIBLE BIOPSY, POSSIBLE POLYPECTOMY 17834 N/A 10/18/2018    Performed by Shelia Spicer MD at [facility] N/A 12/4/2017    Performed by Jus Willard MD at exhibits no maxillary sinus tenderness and no frontal sinus tenderness. Left sinus exhibits no maxillary sinus tenderness and no frontal sinus tenderness.    Mouth/Throat: Uvula is midline, oropharynx is clear and moist and mucous membranes are normal. Muco accessory muscle usage or stridor. No apnea, no tachypnea and no bradypnea. She is not intubated. No respiratory distress. She has no decreased breath sounds. She has no wheezes. She has no rhonchi. She has no rales. She exhibits no tenderness.  Right breas speech is normal. Cognition and memory are normal.   Nursing note and vitals reviewed.            ASSESSMENT/PLAN:   Encounter for annual health examination  (primary encounter diagnosis)  Epigastric pain    Orders Placed This Encounter      Comp Metabolic Power of [employer] for [employer] on file in [address]    Advance care planning including the explanation and discussion of advance directives standard forms performed Face to Face with patient and Family/surrogate (if present), and forms available to patient i shrimp. CURRENT MEDICATIONS:     Outpatient Medications Marked as Taking for the 9/20/19 encounter (Office Visit) with Margie Carroll MD:  azithromycin (ZITHROMAX Z-ELSA) 250 MG Oral Tab Take two tablets by mouth today, then one tablet daily.    Peng Mccollum Assessment  (Required for AWV/SWV)    Hearing Screening    Screening Method:  Questionnaire  I have a problem hearing over the telephone:  No I have trouble following the conversations when two or more people are talking at the same time:  No   I have trou Diagnoses and all orders for this visit:    Encounter for annual health examination  -     ADVANCE CARE PLANNING FIRST 30 MINS  -     URINALYSIS, AUTO, W/O SCOPE    Epigastric pain  -     US ABDOMEN LIMITED (CPT=76705);  Future  -     COMP METABOLIC PANEL results found for: FOB No flowsheet data found. Glaucoma Screening      Ophthalmology Visit Annually: Diabetics, FHx Glaucoma, AA>50, > 65 No flowsheet data found.     Bone Density Screening      Dexascan Every two years Last Dexa Scan:   XR DEXA explanation and discussion of advance directives standard forms performed Face to Face with patient and Family/surrogate (if present), and forms available to patient in AVS       She does NOT have a Power of  for [employer] on file in [address] [place] [address] Rufina Crain

## 2019-09-21 NOTE — PROGRESS NOTES
CBC Normal (white blood cells and red blood cells and platelets),   CMP Normal (electrolytes, sugar, kidney and liver functions),   Amylase and lipase which are pancreatic enzymes are normal.  Urine is clear.

## 2019-10-17 ENCOUNTER — OFFICE VISIT (OUTPATIENT)
Dept: INTERNAL MEDICINE CLINIC | Facility: CLINIC | Age: 69
End: 2019-10-17
Payer: MEDICARE

## 2019-10-17 VITALS
RESPIRATION RATE: 18 BRPM | SYSTOLIC BLOOD PRESSURE: 135 MMHG | DIASTOLIC BLOOD PRESSURE: 80 MMHG | OXYGEN SATURATION: 98 % | BODY MASS INDEX: 37.1 KG/M2 | TEMPERATURE: 98 F | HEART RATE: 87 BPM | WEIGHT: 209.38 LBS | HEIGHT: 63 IN

## 2019-10-17 DIAGNOSIS — M25.50 ARTHRALGIA, UNSPECIFIED JOINT: Primary | ICD-10-CM

## 2019-10-17 PROCEDURE — 99214 OFFICE O/P EST MOD 30 MIN: CPT | Performed by: INTERNAL MEDICINE

## 2019-10-17 RX ORDER — METHYLPREDNISOLONE 4 MG/1
TABLET ORAL
Qty: 1 KIT | Refills: 0 | Status: SHIPPED | OUTPATIENT
Start: 2019-10-17 | End: 2020-01-21

## 2019-10-17 NOTE — PATIENT INSTRUCTIONS
Arthralgia - acute on chronic , will check sed rate , esr , rf , start her on medrol dose pack, discussed  about side effect,  Exercise  If not better follow up ,

## 2019-10-17 NOTE — PROGRESS NOTES
HPI:    Patient ID: Loreto Morris is a 81year old female. HPI  She walked in  To the office . She is here today complaining of neck pain bilateral shoulder pain and back pain.   According to her this is ongoing for long time but is worse for the last 1 adenopathy. Does not bruise/bleed easily. Psychiatric/Behavioral: Negative for agitation, behavioral problems, confusion, hallucinations and sleep disturbance. The patient is not nervous/anxious.           Amitriptyline HCl 75 MG Oral Tab, Take 1 tablet ( • Fatty liver disease, nonalcoholic 19/1252   • Pneumonia due to organism 2016   • Postmenopausal bleeding 11/30/2017   • Prediabetes    • Submucous uterine fibroid 12/4/2017   • Visual impairment     glasses      Past Surgical History:   Procedure Later Right eye exhibits no discharge. Left eye exhibits no discharge. No scleral icterus. Neck: Normal range of motion. Neck supple. No JVD present. No tracheal tenderness present. No tracheal deviation present. No thyroid mass and no thyromegaly present.    C and bring logbook next visit watch diet avoid salty food. No orders of the defined types were placed in this encounter.       Meds This Visit:  Requested Prescriptions      No prescriptions requested or ordered in this encounter       Imaging & Referrals

## 2019-10-24 ENCOUNTER — TELEPHONE (OUTPATIENT)
Dept: INTERNAL MEDICINE CLINIC | Facility: CLINIC | Age: 69
End: 2019-10-24

## 2019-10-24 ENCOUNTER — OFFICE VISIT (OUTPATIENT)
Dept: PULMONOLOGY | Facility: CLINIC | Age: 69
End: 2019-10-24
Payer: MEDICARE

## 2019-10-24 VITALS
BODY MASS INDEX: 37 KG/M2 | OXYGEN SATURATION: 97 % | SYSTOLIC BLOOD PRESSURE: 133 MMHG | DIASTOLIC BLOOD PRESSURE: 79 MMHG | WEIGHT: 209 LBS | HEART RATE: 77 BPM

## 2019-10-24 DIAGNOSIS — Z87.891 PERSONAL HISTORY OF TOBACCO USE, PRESENTING HAZARDS TO HEALTH: ICD-10-CM

## 2019-10-24 DIAGNOSIS — G47.33 OBSTRUCTIVE SLEEP APNEA SYNDROME: Primary | ICD-10-CM

## 2019-10-24 PROCEDURE — 99213 OFFICE O/P EST LOW 20 MIN: CPT | Performed by: INTERNAL MEDICINE

## 2019-10-24 PROCEDURE — G0463 HOSPITAL OUTPT CLINIC VISIT: HCPCS | Performed by: INTERNAL MEDICINE

## 2019-10-24 RX ORDER — ZOLPIDEM TARTRATE 5 MG/1
5 TABLET ORAL NIGHTLY PRN
Qty: 30 TABLET | Refills: 5 | Status: SHIPPED | OUTPATIENT
Start: 2019-10-24 | End: 2019-11-20

## 2019-10-24 RX ORDER — MONTELUKAST SODIUM 10 MG/1
10 TABLET ORAL NIGHTLY
Qty: 7 TABLET | Refills: 0 | Status: SHIPPED | OUTPATIENT
Start: 2019-10-24 | End: 2019-10-27

## 2019-10-24 NOTE — TELEPHONE ENCOUNTER
Can try a short course of Singulair montelukast 10 mg at night and if no better with the cough after for 5 nights or new symptoms fevers chills etc. she needs to come in.

## 2019-10-24 NOTE — TELEPHONE ENCOUNTER
Per patient she is requesting a medication for her cough. No appointments available with PCP. Only wants to see PCP.

## 2019-10-24 NOTE — TELEPHONE ENCOUNTER
Spoke with the patient and instructed her on Dr. Erick Ernandez orders and plan of care. Patient voiced understanding and agreed with the plan of care.

## 2019-10-24 NOTE — PROGRESS NOTES
Dear Marissa Santizo:           As you know, Mayda Batista is a 73-year-old female who I am now evaluating for sleep  disturbance.     HISTORY OF PRESENT ILLNESS: The patient has a history of difficulty initiating maintaining sleep as well as obstructive sleep apnea hav thyromegaly, JVD nor bruit. Lungs clear to auscultation and percussion. Cardiac regular rate and rhythm no murmur. Abdomen nontender, without hepatosplenomegaly and no mass appreciable. Extremities without clubbing cyanosis nor edema.  Neurologic grossly in Care, [facility]  Medical Director, [facility]

## 2019-10-24 NOTE — TELEPHONE ENCOUNTER
Language line #   [phone] Assisted with the phone call.    Action Requested: Summary for Provider     []  Critical Lab, Recommendations Needed  [] Need Additional Advice  []   FYI    []   Need Orders  [] Need Medications Sent to Pharmacy  []  Other     [facility]

## 2019-10-29 NOTE — TELEPHONE ENCOUNTER
Patient called stating she was still not feeling well and wanted to make an appointment. [facility] scheduled a 2:15 pm for today but patient only wants to see PCP. Patient told [facility] she will call back tomorrow.

## 2019-10-30 RX ORDER — MONTELUKAST SODIUM 10 MG/1
TABLET ORAL
Qty: 90 TABLET | Refills: 0 | Status: SHIPPED | OUTPATIENT
Start: 2019-10-30 | End: 2020-01-29

## 2019-10-30 NOTE — TELEPHONE ENCOUNTER
Do 330 on Friday, November 1 at [facility] office only available. Needs to come in only for that issue nothing else.

## 2019-10-30 NOTE — TELEPHONE ENCOUNTER
Refill passed per [facility] protocol. However Dr. Joya Come pls advise on refill request as rx was last approved on 10/24/19 for short course tx.     Requested Prescriptions   Pending Prescriptions Disp Refills   • MONTELUKAST SODIUM 10 MG Oral Tab [Pha

## 2019-11-15 ENCOUNTER — OFFICE VISIT (OUTPATIENT)
Dept: RHEUMATOLOGY | Facility: CLINIC | Age: 69
End: 2019-11-15
Payer: MEDICARE

## 2019-11-15 VITALS
HEIGHT: 63 IN | HEART RATE: 86 BPM | WEIGHT: 204 LBS | BODY MASS INDEX: 36.14 KG/M2 | SYSTOLIC BLOOD PRESSURE: 124 MMHG | DIASTOLIC BLOOD PRESSURE: 76 MMHG

## 2019-11-15 DIAGNOSIS — M79.18 MYOFASCIAL PAIN SYNDROME: Primary | ICD-10-CM

## 2019-11-15 PROCEDURE — 99204 OFFICE O/P NEW MOD 45 MIN: CPT | Performed by: INTERNAL MEDICINE

## 2019-11-15 PROCEDURE — G0463 HOSPITAL OUTPT CLINIC VISIT: HCPCS | Performed by: INTERNAL MEDICINE

## 2019-11-15 RX ORDER — MELOXICAM 7.5 MG/1
7.5 TABLET ORAL 2 TIMES DAILY
Qty: 60 TABLET | Refills: 0 | Status: SHIPPED | OUTPATIENT
Start: 2019-11-15 | End: 2020-05-13 | Stop reason: ALTCHOICE

## 2019-11-15 NOTE — PROGRESS NOTES
Traci Austin is a 81year old female who presents for Patient presents with:  Consult  Joint Pain: stiffness  Shoulder Pain  Neck Pain  .    HPI:   CC: generalized pain  Consult: referred by PCP Dr. Mc Collins    This is a 82 yo Italian speaking F with hx of qam. 207 g 1   • Montelukast Sodium 10 MG Oral Tab Take 1 tablet (10 mg total) by mouth nightly. 30 tablet 1   • ALPRAZolam 0.5 MG Oral Tab Take 1 tablet (0.5 mg total) by mouth nightly as needed for Sleep or Anxiety.  15 tablet 0      Past Medical History: hx of miscarriages, no DVT Hx, no hx of OCP,   Neuro: No numbness or tingling, no headache, no hx of seizures,   Psych: no hx of anxiety or depression  ENDO: no hx of thyroid disease, no hx of DM  Joint/Muscluskeltal: see HPI,   All other ROS are negative. Absolute      1.50 - 7.70 x10(3) uL 3.29   Lymphocytes Absolute      1.00 - 4.00 x10(3) uL 3.03   Monocytes Absolute      0.10 - 1.00 x10(3) uL 0.67   Eosinophils Absolute      0.00 - 0.70 x10(3) uL 0.36   Basophils Absolute      0.00 - 0.20 x10(3) uL 0.08 syndrome  - Prescribed meloxicam to take 1 to 2 pills a day.   - Also will send to physical therapy  - Blood work shows normal ESR, CRP and RF, no evidence of synovitis on exam or symptoms of inflammatory arthritis    Thank you for allowing me to participat

## 2019-11-15 NOTE — PATIENT INSTRUCTIONS
You were seen today for neck and shoulder pain  Try mobic 1-2 pills a day but no more than that, cannot take with advil or motrin but can take with tylenol if needed  Will also send to PT

## 2019-11-19 RX ORDER — AMITRIPTYLINE HYDROCHLORIDE 75 MG/1
TABLET, FILM COATED ORAL
Qty: 90 TABLET | Refills: 1 | Status: SHIPPED | OUTPATIENT
Start: 2019-11-19 | End: 2020-05-12

## 2019-11-19 NOTE — TELEPHONE ENCOUNTER
Patient asking for stronger dosage strength for rx:Zolpidem 5 MG, patient indicates it is helping somewhat with her sleep. Please call with  at:[phone],thanks.

## 2019-11-19 NOTE — TELEPHONE ENCOUNTER
RN, please facilitate increased dose to 10 mg zolpidem with refills. The patient can take 2 with a 5 mg tablets to see if this works for her.

## 2019-11-19 NOTE — TELEPHONE ENCOUNTER
Refill passed per [facility] protocol.   Refill Protocol Appointment Criteria  · Appointment scheduled in the past 6 months or in the next 3 months  Recent Outpatient Visits            4 days ago Myofascial pain syndrome    [facility]

## 2019-11-19 NOTE — TELEPHONE ENCOUNTER
Patient requesting increased dosage of zolpidem. Please advise. Call conducted with Irasema Morse   ID [id number]. Patient states she is still struggling to sleep. She has been taking the 5 mg dose since 10/24/19.     Patient is requesting increased dos

## 2019-11-20 RX ORDER — ZOLPIDEM TARTRATE 10 MG/1
10 TABLET ORAL NIGHTLY PRN
Qty: 30 TABLET | Refills: 5 | Status: SHIPPED
Start: 2019-11-20 | End: 2020-05-20

## 2019-11-20 NOTE — TELEPHONE ENCOUNTER
Spoke to pt via [address] [place] [address]  [place] [id number]. Pt informed of Dr. Rosas See message/orders below. Pt verbalized understanding. Pt requesting prescription to be sent to pharmacy.       Dr. Ada Paiz please review pended prescription

## 2019-11-22 ENCOUNTER — TELEPHONE (OUTPATIENT)
Dept: PULMONOLOGY | Facility: CLINIC | Age: 69
End: 2019-11-22

## 2019-12-07 ENCOUNTER — HOSPITAL ENCOUNTER (OUTPATIENT)
Dept: ULTRASOUND IMAGING | Facility: HOSPITAL | Age: 69
Discharge: HOME | End: 2019-12-07
Attending: INTERNAL MEDICINE
Payer: MEDICARE

## 2019-12-07 ENCOUNTER — HOSPITAL ENCOUNTER (OUTPATIENT)
Dept: CT IMAGING | Facility: HOSPITAL | Age: 69
Discharge: HOME | End: 2019-12-07
Attending: INTERNAL MEDICINE
Payer: MEDICARE

## 2019-12-07 DIAGNOSIS — Z87.891 PERSONAL HISTORY OF TOBACCO USE, PRESENTING HAZARDS TO HEALTH: ICD-10-CM

## 2019-12-07 DIAGNOSIS — R10.13 EPIGASTRIC PAIN: ICD-10-CM

## 2019-12-07 PROCEDURE — 76705 ECHO EXAM OF ABDOMEN: CPT | Performed by: INTERNAL MEDICINE

## 2019-12-09 ENCOUNTER — TELEPHONE (OUTPATIENT)
Dept: PULMONOLOGY | Facility: CLINIC | Age: 69
End: 2019-12-09

## 2019-12-09 DIAGNOSIS — Z87.891 PERSONAL HISTORY OF TOBACCO USE, PRESENTING HAZARDS TO HEALTH: Primary | ICD-10-CM

## 2019-12-09 NOTE — TELEPHONE ENCOUNTER
----- Message from Radha Arreola MD sent at 12/8/2019  9:01 PM CST -----  RN, please let the patient know that her low-dose CT scan the chest showed no evidence of cancer.   Please add to the calendar a repeat low-dose CT scan of the chest at the [facility]yea

## 2019-12-09 NOTE — TELEPHONE ENCOUNTER
Called and notified pt's son Magi Blackman of results per MD message below, he verbalized understanding and will inform pt. Order placed in chronic calendar.

## 2019-12-11 NOTE — PROGRESS NOTES
With [facility] #[id number] name and , advised Dr Isabel Chapman note and verbalized understanding.     Notes recorded by Conor Aquino MD on 2019 at 8:48 AM CST  Ultrasound of the abdomen done 2019 shows fatty liver.  Fatty liver

## 2019-12-26 ENCOUNTER — OFFICE VISIT (OUTPATIENT)
Dept: OBGYN CLINIC | Facility: CLINIC | Age: 69
End: 2019-12-26
Payer: MEDICARE

## 2019-12-26 VITALS
HEART RATE: 82 BPM | DIASTOLIC BLOOD PRESSURE: 68 MMHG | BODY MASS INDEX: 36 KG/M2 | SYSTOLIC BLOOD PRESSURE: 133 MMHG | WEIGHT: 206 LBS

## 2019-12-26 DIAGNOSIS — Z12.31 SCREENING MAMMOGRAM, ENCOUNTER FOR: ICD-10-CM

## 2019-12-26 DIAGNOSIS — Z01.419 WOMEN'S ANNUAL ROUTINE GYNECOLOGICAL EXAMINATION: Primary | ICD-10-CM

## 2019-12-26 PROCEDURE — G0101 CA SCREEN;PELVIC/BREAST EXAM: HCPCS | Performed by: OBSTETRICS & GYNECOLOGY

## 2019-12-26 NOTE — PROGRESS NOTES
HPI:    Patient ID: Moni Batista is a 81year old year old female. HPI  Well woman visit  No complaints. Denies vaginal bleeding. Review of Systems   Constitutional: Negative. Gastrointestinal: Negative. Genitourinary: Negative.     All other s abnormalities  Abdominal: markedly protuberant. Soft. Normal appearance and bowel sounds are normal. She exhibits no mass. There is no hepatosplenomegaly. There is no tenderness. There is no rebound and no CVA tenderness. No hernia.  Hernia negative in the Orders Placed This Encounter      Hpv Dna  High Risk , Thin Prep Collect          Standing Status: Future          Standing Expiration Date: 12/26/2020          Order Specific Question: HPV Genotyping Request (if HPV positive):           Answer:

## 2020-01-21 ENCOUNTER — OFFICE VISIT (OUTPATIENT)
Dept: INTERNAL MEDICINE CLINIC | Facility: CLINIC | Age: 70
End: 2020-01-21
Payer: MEDICARE

## 2020-01-21 VITALS
WEIGHT: 205 LBS | BODY MASS INDEX: 36.32 KG/M2 | TEMPERATURE: 98 F | RESPIRATION RATE: 18 BRPM | HEART RATE: 78 BPM | DIASTOLIC BLOOD PRESSURE: 75 MMHG | HEIGHT: 63 IN | SYSTOLIC BLOOD PRESSURE: 131 MMHG

## 2020-01-21 DIAGNOSIS — R11.0 NAUSEA: Primary | ICD-10-CM

## 2020-01-21 DIAGNOSIS — E74.89 OTHER SPECIFIED DISORDERS OF CARBOHYDRATE METABOLISM (HCC): ICD-10-CM

## 2020-01-21 LAB
ALBUMIN SERPL-MCNC: 3.7 G/DL (ref 3.4–5)
ALBUMIN/GLOB SERPL: 1.1 {RATIO} (ref 1–2)
ALP LIVER SERPL-CCNC: 85 U/L (ref 55–142)
ALT SERPL-CCNC: 64 U/L (ref 13–56)
ANION GAP SERPL CALC-SCNC: 5 MMOL/L (ref 0–18)
AST SERPL-CCNC: 56 U/L (ref 15–37)
BILIRUB SERPL-MCNC: 0.3 MG/DL (ref 0.1–2)
BUN BLD-MCNC: 8 MG/DL (ref 7–18)
BUN/CREAT SERPL: 12.7 (ref 10–20)
CALCIUM BLD-MCNC: 9.3 MG/DL (ref 8.5–10.1)
CHLORIDE SERPL-SCNC: 106 MMOL/L (ref 98–112)
CO2 SERPL-SCNC: 31 MMOL/L (ref 21–32)
CREAT BLD-MCNC: 0.63 MG/DL (ref 0.55–1.02)
GLOBULIN PLAS-MCNC: 3.5 G/DL (ref 2.8–4.4)
GLUCOSE BLD-MCNC: 93 MG/DL (ref 70–99)
M PROTEIN MFR SERPL ELPH: 7.2 G/DL (ref 6.4–8.2)
OSMOLALITY SERPL CALC.SUM OF ELEC: 292 MOSM/KG (ref 275–295)
PATIENT FASTING Y/N/NP: YES
POTASSIUM SERPL-SCNC: 4.3 MMOL/L (ref 3.5–5.1)
SODIUM SERPL-SCNC: 142 MMOL/L (ref 136–145)

## 2020-01-21 PROCEDURE — 99213 OFFICE O/P EST LOW 20 MIN: CPT | Performed by: INTERNAL MEDICINE

## 2020-01-21 RX ORDER — ONDANSETRON 4 MG/1
4 TABLET, FILM COATED ORAL EVERY 12 HOURS PRN
Qty: 20 TABLET | Refills: 0 | Status: SHIPPED | OUTPATIENT
Start: 2020-01-21 | End: 2020-05-13 | Stop reason: ALTCHOICE

## 2020-01-21 RX ORDER — PANTOPRAZOLE SODIUM 40 MG/1
40 TABLET, DELAYED RELEASE ORAL
Qty: 30 TABLET | Refills: 0 | Status: SHIPPED | OUTPATIENT
Start: 2020-01-21 | End: 2020-05-20

## 2020-01-21 NOTE — PATIENT INSTRUCTIONS
Nausea  (primary encounter diagnosis) will try zofran as needed every 8 hr , start protonix daily , watch diet , avoid heavy meal , more gi soft food, drink more fluids , will check her cmp, if not better follow up

## 2020-01-21 NOTE — PROGRESS NOTES
HPI:    Patient ID: Rose Colmenares is a 81year old female. HPI She is here today complaining of nausea . She came back from [place] on Saturday .  She states that while she was there she was not feeling well she had vomiting and nausea multiple episodes as needed for Nausea.  20 tablet 0   • Pantoprazole Sodium 40 MG Oral Tab EC Take 1 tablet (40 mg total) by mouth every morning before breakfast. 30 tablet 0   • Zolpidem Tartrate 10 MG Oral Tab Take 1 tablet (10 mg total) by mouth nightly as needed for [facility] disease    • Breast mass in female    • Difficulty sleeping    • Elevated liver enzymes 10/2017   • Fatty liver disease, nonalcoholic 98/3867   • Pneumonia due to organism 2016   • Postmenopausal bleeding 11/30/2017   • Prediabetes    • Submucous uterine f erythema. Eyes: Pupils are equal, round, and reactive to light. Conjunctivae and EOM are normal. Right eye exhibits no discharge. Left eye exhibits no discharge. No scleral icterus. Neck: Normal range of motion. Neck supple. No JVD present.  No tracheal every 12 (twelve) hours as needed for Nausea.    • Pantoprazole Sodium 40 MG Oral Tab EC 30 tablet 0     Sig: Take 1 tablet (40 mg total) by mouth every morning before breakfast.       Imaging & Referrals:  None        OQ#[id number]

## 2020-01-23 ENCOUNTER — TELEPHONE (OUTPATIENT)
Dept: OTHER | Age: 70
End: 2020-01-23

## 2020-01-23 NOTE — TELEPHONE ENCOUNTER
With  Jessica Brush ID #[id number] patient was called to give lab results. Advised patient of Dr. Rafia Mclean note below. Patient verbalized understanding.       Patient states she was seen in office 1/21/20 for nausea and vomiting and is not feeling bett

## 2020-01-24 NOTE — TELEPHONE ENCOUNTER
Patient notified of provider's recommendation. Patient verbalized understanding. Denied abd pain. She mentioned she had eaten unhealthy meals in [place]. She will eat better. She feels perhaps that is why she is feeling nauseated.  She also mentioned sh

## 2020-01-29 RX ORDER — MONTELUKAST SODIUM 10 MG/1
TABLET ORAL
Qty: 90 TABLET | Refills: 1 | Status: SHIPPED | OUTPATIENT
Start: 2020-01-29 | End: 2020-05-13 | Stop reason: ALTCHOICE

## 2020-01-30 NOTE — TELEPHONE ENCOUNTER
Refill passed per [facility] protocol.   Refill Protocol Appointment Criteria  · Appointment scheduled in the past 6 months or in the next 3 months  Recent Outpatient Visits            1 week ago Nausea    [facility], [address] [place] [address],

## 2020-02-07 ENCOUNTER — LAB ENCOUNTER (OUTPATIENT)
Dept: LAB | Facility: HOSPITAL | Age: 70
End: 2020-02-07
Attending: INTERNAL MEDICINE
Payer: MEDICARE

## 2020-02-07 DIAGNOSIS — R79.89 ELEVATED LFTS: ICD-10-CM

## 2020-02-07 LAB
ALBUMIN SERPL-MCNC: 3.9 G/DL (ref 3.4–5)
ALBUMIN/GLOB SERPL: 1.1 {RATIO} (ref 1–2)
ALP LIVER SERPL-CCNC: 90 U/L (ref 55–142)
ALT SERPL-CCNC: 56 U/L (ref 13–56)
ANION GAP SERPL CALC-SCNC: 8 MMOL/L (ref 0–18)
AST SERPL-CCNC: 36 U/L (ref 15–37)
BILIRUB SERPL-MCNC: 0.6 MG/DL (ref 0.1–2)
BUN BLD-MCNC: 13 MG/DL (ref 7–18)
BUN/CREAT SERPL: 17.6 (ref 10–20)
CALCIUM BLD-MCNC: 9.5 MG/DL (ref 8.5–10.1)
CHLORIDE SERPL-SCNC: 107 MMOL/L (ref 98–112)
CO2 SERPL-SCNC: 27 MMOL/L (ref 21–32)
CREAT BLD-MCNC: 0.74 MG/DL (ref 0.55–1.02)
GLOBULIN PLAS-MCNC: 3.6 G/DL (ref 2.8–4.4)
GLUCOSE BLD-MCNC: 96 MG/DL (ref 70–99)
HAV AB SER QL IA: REACTIVE
HAV IGM SER QL: NONREACTIVE
HBV CORE AB SERPL QL IA: NONREACTIVE
HBV SURFACE AB SER QL: NONREACTIVE
HBV SURFACE AB SERPL IA-ACNC: <3.1 MIU/ML
HBV SURFACE AG SERPL QL IA: NONREACTIVE
HCV AB SERPL QL IA: NONREACTIVE
M PROTEIN MFR SERPL ELPH: 7.5 G/DL (ref 6.4–8.2)
OSMOLALITY SERPL CALC.SUM OF ELEC: 294 MOSM/KG (ref 275–295)
PATIENT FASTING Y/N/NP: NO
POTASSIUM SERPL-SCNC: 3.9 MMOL/L (ref 3.5–5.1)
SODIUM SERPL-SCNC: 142 MMOL/L (ref 136–145)

## 2020-02-07 PROCEDURE — 86803 HEPATITIS C AB TEST: CPT

## 2020-02-07 PROCEDURE — 86709 HEPATITIS A IGM ANTIBODY: CPT

## 2020-02-07 PROCEDURE — 86706 HEP B SURFACE ANTIBODY: CPT

## 2020-02-07 PROCEDURE — 86704 HEP B CORE ANTIBODY TOTAL: CPT

## 2020-02-07 PROCEDURE — 80053 COMPREHEN METABOLIC PANEL: CPT

## 2020-02-07 PROCEDURE — 80500 HEPATITIS A B + C PROFILE: CPT

## 2020-02-07 PROCEDURE — 87340 HEPATITIS B SURFACE AG IA: CPT

## 2020-02-07 PROCEDURE — 36415 COLL VENOUS BLD VENIPUNCTURE: CPT

## 2020-02-07 PROCEDURE — 86708 HEPATITIS A ANTIBODY: CPT

## 2020-02-15 ENCOUNTER — HOSPITAL ENCOUNTER (OUTPATIENT)
Dept: MAMMOGRAPHY | Facility: HOSPITAL | Age: 70
Discharge: HOME | End: 2020-02-15
Attending: OBSTETRICS & GYNECOLOGY
Payer: MEDICARE

## 2020-02-15 DIAGNOSIS — Z12.31 SCREENING MAMMOGRAM, ENCOUNTER FOR: ICD-10-CM

## 2020-02-15 PROCEDURE — 77067 SCR MAMMO BI INCL CAD: CPT | Performed by: OBSTETRICS & GYNECOLOGY

## 2020-02-15 PROCEDURE — 77063 BREAST TOMOSYNTHESIS BI: CPT | Performed by: OBSTETRICS & GYNECOLOGY

## 2020-05-12 ENCOUNTER — TELEPHONE (OUTPATIENT)
Dept: INTERNAL MEDICINE CLINIC | Facility: CLINIC | Age: 70
End: 2020-05-12

## 2020-05-12 ENCOUNTER — OFFICE VISIT (OUTPATIENT)
Dept: INTERNAL MEDICINE CLINIC | Facility: CLINIC | Age: 70
End: 2020-05-12
Payer: MEDICARE

## 2020-05-12 VITALS
OXYGEN SATURATION: 97 % | HEART RATE: 64 BPM | SYSTOLIC BLOOD PRESSURE: 144 MMHG | BODY MASS INDEX: 36 KG/M2 | HEIGHT: 61.75 IN | DIASTOLIC BLOOD PRESSURE: 74 MMHG | WEIGHT: 195.63 LBS | TEMPERATURE: 99 F

## 2020-05-12 DIAGNOSIS — M79.602 PAIN OF LEFT UPPER EXTREMITY: ICD-10-CM

## 2020-05-12 DIAGNOSIS — E78.00 HIGH CHOLESTEROL: Primary | ICD-10-CM

## 2020-05-12 PROCEDURE — 99214 OFFICE O/P EST MOD 30 MIN: CPT | Performed by: INTERNAL MEDICINE

## 2020-05-12 RX ORDER — METHYLPREDNISOLONE 4 MG/1
TABLET ORAL
Qty: 1 KIT | Refills: 0 | Status: SHIPPED | OUTPATIENT
Start: 2020-05-12 | End: 2020-06-08 | Stop reason: ALTCHOICE

## 2020-05-12 NOTE — PROGRESS NOTES
HPI:    Patient ID: Susan Norman is a 81year old female. Exercise level: not active and has been following low salt diet. Weight has been stable.     Wt Readings from Last 3 Encounters:  05/12/20 : 195 lb 9.6 oz (88.7 kg)  01/21/20 : 205 lb (93 kg) stridor. Cardiovascular: Negative for chest pain, palpitations and leg swelling. Gastrointestinal: Negative for abdominal distention and abdominal pain. Endocrine: Negative for cold intolerance, heat intolerance, polydipsia, polyphagia and polyuria. 5/12/2020 ) 28.35 g 1   • Montelukast Sodium 10 MG Oral Tab Take 1 tablet (10 mg total) by mouth nightly. (Patient not taking: Reported on 5/12/2020 ) 30 tablet 1   • FLUoxetine HCl 20 MG Oral Cap Take 1 capsule (20 mg total) by mouth nightly.  (Patient not tobacco: Never Used    Alcohol use: No    Drug use: No       PHYSICAL EXAM:   /74 (BP Location: Right arm, Cuff Size: adult)   Pulse 64   Temp 98.5 °F (36.9 °C) (Oral)   Ht 5' 1.75\" (1.568 m)   Wt 195 lb 9.6 oz (88.7 kg)   SpO2 97%   BMI 36.07 kg/m² and normal strength. Left elbow: She exhibits normal range of motion, no swelling, no effusion, no deformity and no laceration. No tenderness found.       Left wrist: She exhibits normal range of motion, no tenderness, no bony tenderness, no swelling,

## 2020-05-12 NOTE — TELEPHONE ENCOUNTER
She would need to make an appointment and have an evaluation. Can just call in pain medicines like that. She can get into see anyone within our group.

## 2020-05-12 NOTE — TELEPHONE ENCOUNTER
Dr Leonardo Zayas, please advise. Patient stated that she is have much pain in her arms, rated 10, left worse then right, can hardly lift left arm, pain left hand to left neck area. She is able to wiggle fingers of both hands, painful but able.  Skin temperature

## 2020-05-13 ENCOUNTER — TELEPHONE (OUTPATIENT)
Dept: INTERNAL MEDICINE CLINIC | Facility: CLINIC | Age: 70
End: 2020-05-13

## 2020-05-13 RX ORDER — TIZANIDINE 4 MG/1
4 TABLET ORAL NIGHTLY
Qty: 30 TABLET | Refills: 0 | Status: SHIPPED | OUTPATIENT
Start: 2020-05-13 | End: 2021-01-25

## 2020-05-13 NOTE — PATIENT INSTRUCTIONS
ASSESSMENT/PLAN:   High cholesterol  (primary encounter diagnosis) Check blood. Pain of left upper extremity Check Xrays. Medrol with food. Call with name of medications. Excercises. Discussed with patient of side effects and use of these medications. Most of the time mechanical problems with the muscles or spine cause the pain. it is usually caused by an injury, whether known or not, to the muscles or ligaments. While illnesses can cause back pain, it is usually not caused by a serious illness.  Pain is · You can alternate ice and heat therapies. Talk with your healthcare provider about the best treatment for your back or neck pain. As a safety precaution, do not use a heating pad at bedtime.  Sleeping with a heating pad can lead to skin burns or tissue da © 6505-1134 [address]. 1407 [facility] [place], [address] [place]. All rights reserved. This information is not intended as a substitute for professional medical care. Always follow your healthcare professional's instructions.         ¿Qué es · Masajes para disminuir la rigidez del lawanda. · Compresas de frío o calor. Tomie Carrel a reducir el dolor y la hinchazón.      Llame al 911  Llame enseguida a los servicios de emergencia si presenta cualquiera de estos síntomas:  · Entumecimiento o caída de l © 4623-7133 [address]. 1407 [facility] [place], [facility] [place]. All rights reserved. This information is not intended as a substitute for professional medical care. Always follow your healthcare professional's instructions.         Kiki OLSON 2. Cuando esté en la cama, trate de encontrar daniel posición cómoda. Es aconsejable que use un colchón firme. Intente acostarse boca arriba con almohadas debajo de las rodillas.  También puede intentar acostarse de lado con las [address] · Bridgetteyn Lacy de 100.4°F (38°C) o más dorinda, o dalton le haya indicado mosquera proveedor de atención médica  Date Last Reviewed: 7/1/2016  © 0119-6592 [address]. 1407 [facility] [place] [address] [place] [address]. Todos los derechos reservados.  Esta información Para comenzar, párese mike erguido, con los oídos, los hombros y las caderas [place]. Debe tener los pies ligeramente separados, colocados portia debajo de las caderas. Enfoque la vista directamente hacia adelante.  Párese en esta posición yung unos seg © 0000-6155 [address]. 1407 [facility], 1612 [facility]. Todos los derechos reservados. Esta información no pretende sustituir la atención médica profesional. Sólo mosquera médico puede diagnosticar y tratar un problema de kellie. © 9006-4755 [address]. 1407 [facility] [place], 1612 [facility]. Todos los derechos reservados. Esta información no pretende sustituir la atención médica profesional. Sólo mosquera médico puede diagnosticar y tratar un problema de kellie. Date Last Reviewed: 11/1/2017  © 4465-2323 [address]. 1407 [facility] [place], [facility]. Todos los derechos reservados.  Esta información no pretende sustituir la atención médica profesional. Sólo mosquera médico puede diagnosticar y trata · Con el lawanda relajado, colóquese la ovalles de daniel mano en la frente. Use la mano para hacer girar la david hacia un lado hasta sentir el estiramiento en los músculos. No christina aury ejercicio si siente dolor. · Sostenga la posición yung 5 segundos.  A 3. Coloque mosquera mano izquierda sobre el lado derecho de mosquera david. Empuje mosquera david suavemente hacia la izquierda. Sostenga por 30 a 60 segundos. Aplique daniel presión suave para aumentar el estiramiento. No fuerce mosquera david a la posición.   4. Regrese Katerina Seller [place] Para mosquera propia seguridad, consulte con mosquera proveedor de atención médica antes de iniciar cualquier programa de ejercicios. Date Last Reviewed: 11/1/2017  © 3554-3849 [address]. 1407 [facility] [place], [address] [place] [address] Soniya.  Todos los [facility] A menos que usted haya tenido daniel lesión física por impacto (por ejemplo, daniel caída o un accidente de automóvil), no suelen pedirse radiografías (X-rays) para la evaluación inicial del dolor de lawanda.  Si el dolor persiste y no responde al tratamiento médi · El dolor [place] o se esparce a amanda o ambos brazos. · Siente debilidad o entumecimiento en amanda o ambos brazos. · Dolor de david que va en aumento. · Hinchazón del lawanda, dificultad o dolor al tragar.   · Vonna Pair de 100.4°F (38°C) o más dorinda, o [employer]	Bells · Latigazo cervical y otras lesiones: Pueden producirse latigazos cervicales cuando un impacto sacude la david de un modo que obliga al lawanda a doblarse demasiado hacia adelante (hiperflexión) y luego a retroceder excesivamente (hiperextensión).  Estos do · Dolor  El dolor sordo en la david o el lawanda, los elsy agudos y la hinchazón del tejido blando en el lawanda y los hombros son síntomas comunes.  Si tiene presión en los nervios del lawanda, es posible que sienta dolor en los brazos o en las maikel (dol Para revisar mosquera postura, use un katy de cuerpo entero. Para comenzar, póngase de pie de manera normal. A continuación, retroceda lentamente hasta ponerse con la espalda contra la pared. ¿Hay espacio entre mosquera david y la pared?  ¿Kmy caer los hombros? ¿Ti · Siéntese derecho, con las plantas de los pies apoyadas en el piso. Si los pies no le llegan al piso, use un reposapiés. · No permanezca sentado ni manejando yung largos períodos. [place] descansos frecuentes.   Al dormir   Para protegerse el lawanda al do Nota: Mantenga los brazos lo más inmóviles que pueda. Con el tiempo, intente girar el cuerpo un poco más para aumentar el estiramiento. Jessenia tenga cuidado de no torcer la espalda.   Hombro rígido  El hombro rígido (llamado también capsulitis retráctil) es u Nota: No encorve la espalda. No importa si no logra alcanzar el medio de la espalda con la Isa. En efrain panda, comience el estiramiento con la mano tan cerca del medio de la espalda dalton le sea posible llevarla.      Hombro doloroso  El hombro doloroso (llam 11. Aumente gradualmente la frecuencia de aury ejercicio hasta hacer 3 series de estiramientos 3 veces al día. Aumente gradualmente el tiempo que sostiene cada estiramiento hasta 30-60 segundos.   Nota: Asegúrese de empujar el codo horizontalmente frente al · Tírese el extremo de daniel toalla sobre el hombro. Con la [place], agarre efrain extremo detrás de mosquera espalda. · Jale suavemente la toalla con mosquera brazo delantero. Deje que el brazo posterior se deslice hacia arriba hasta donde pueda hacerlo cómodamente.  Se PRECAUCIÓN: Ronna aury ejercicio de estiramiento únicamente si se lo recomienda mosquera proveedor de [employer]. No lo ronna si se acaba de lesionar. Date Last Reviewed:   © 1647-4818 [address].  1407 [facility] [place], 1612 [place]y [place]nes · Imagínese que mosquera hombro derecho es el centro de un reloj. Con la parte más externa del hombro, trace lentamente el borde exterior del reloj. · Muévase stephen en el sentido de las agujas del reloj, luego en sentido contrario.   · Repita esto de caroline a ci Los ejercicios de fortalecimiento ayudan a estabilizar el hombro lesionado, aumentando la fuerza de los músculos que le dan apoyo. Para entrar en calor, christina stephen ejercicios de flexibilidad (estiramiento).   · Con los pies y las maikel alineados con los h

## 2020-05-13 NOTE — TELEPHONE ENCOUNTER
Patient calling back with medication information Dr hunter. States she is taking Zolpidem 10mg to sleep, Dr Makenzie Chino prescribed this.

## 2020-05-13 NOTE — TELEPHONE ENCOUNTER
Patient is scheduled for Wellness in September and will need to fast 6 hours water only per Dr Sow Slider patient is Georgian speaking.

## 2020-05-13 NOTE — TELEPHONE ENCOUNTER
Cannot did not have a full list last night of medications. She did not know what she was taking. Please update medication list.  I updated some of it but she was not sure about a lot of her medications.   I did send tizanidine to the pharmacy to help rela

## 2020-05-15 RX ORDER — MONTELUKAST SODIUM 10 MG/1
10 TABLET ORAL NIGHTLY
Qty: 30 TABLET | Refills: 1 | Status: SHIPPED | OUTPATIENT
Start: 2020-05-15 | End: 2020-11-01

## 2020-05-15 NOTE — TELEPHONE ENCOUNTER
Called patient. Went over her medications with her. Patient states that she is taking,  Pantoprazole 40mg 1 tablet in the morning. Zolpidem 10mg 1 tablet at night. Tizanidine 4 mg 1 tablet at night. Medrol as directed  Montelukast as need it.

## 2020-05-19 RX ORDER — PANTOPRAZOLE SODIUM 40 MG/1
40 TABLET, DELAYED RELEASE ORAL
Qty: 30 TABLET | Refills: 0 | OUTPATIENT
Start: 2020-05-19

## 2020-05-20 ENCOUNTER — HOSPITAL ENCOUNTER (OUTPATIENT)
Dept: GENERAL RADIOLOGY | Facility: HOSPITAL | Age: 70
Discharge: HOME | End: 2020-05-20
Attending: INTERNAL MEDICINE
Payer: MEDICARE

## 2020-05-20 ENCOUNTER — TELEPHONE (OUTPATIENT)
Dept: PULMONOLOGY | Facility: CLINIC | Age: 70
End: 2020-05-20

## 2020-05-20 ENCOUNTER — TELEPHONE (OUTPATIENT)
Dept: INTERNAL MEDICINE CLINIC | Facility: CLINIC | Age: 70
End: 2020-05-20

## 2020-05-20 DIAGNOSIS — M79.602 PAIN OF LEFT UPPER EXTREMITY: ICD-10-CM

## 2020-05-20 PROCEDURE — 73030 X-RAY EXAM OF SHOULDER: CPT | Performed by: INTERNAL MEDICINE

## 2020-05-20 PROCEDURE — 72040 X-RAY EXAM NECK SPINE 2-3 VW: CPT | Performed by: INTERNAL MEDICINE

## 2020-05-20 RX ORDER — PANTOPRAZOLE SODIUM 40 MG/1
40 TABLET, DELAYED RELEASE ORAL
Qty: 90 TABLET | Refills: 0 | Status: SHIPPED | OUTPATIENT
Start: 2020-05-20 | End: 2021-03-17

## 2020-05-20 NOTE — TELEPHONE ENCOUNTER
Pt. requesting to get a refill for Pantoprazole and zolpidem. Pt. states that she  has run out of her meds.      Current Outpatient Medications   Medication Sig Dispense Refill   •       •       •       • Pantoprazole Sodium 40 MG Oral Tab EC Take 1 tablet

## 2020-05-20 NOTE — TELEPHONE ENCOUNTER
Pt was called and informed of Dr. Susu Benavides message below and she verbalized understanding. She stated that she will see if Dr. Earl Orozco refills for her if not she will call us back.

## 2020-05-20 NOTE — TELEPHONE ENCOUNTER
Ambien was prescribed in November by Dr. North Garcia with 5 refills. It is a highly addictive substance. I do not usually prescribe that is a long-term use.

## 2020-05-20 NOTE — TELEPHONE ENCOUNTER
Patient requesting script refill for rx:Zolpidem 10 MG, please call with , thanks.     Current Outpatient Medications:   •  Zolpidem Tartrate 10 MG Oral Tab, Take 1 tablet (10 mg total) by mouth nightly as needed for Sleep., Disp: 30 tabl

## 2020-05-21 RX ORDER — ZOLPIDEM TARTRATE 10 MG/1
TABLET ORAL
Qty: 30 TABLET | Refills: 5 | Status: SHIPPED
Start: 2020-05-21 | End: 2020-12-02

## 2020-05-26 RX ORDER — ZOLPIDEM TARTRATE 10 MG/1
TABLET ORAL
Qty: 30 TABLET | Refills: 0 | OUTPATIENT
Start: 2020-05-26

## 2020-05-26 NOTE — TELEPHONE ENCOUNTER
Patient requesting script refill for rx:Zolpidem, patient not able to sleep has no more rx left. Please call with  at:[phone],thanks.   Current Outpatient Medications:   •  ZOLPIDEM TARTRATE 10 MG Oral Tab, TOME IRMA TABLETA (10 MG EN

## 2020-05-27 ENCOUNTER — APPOINTMENT (OUTPATIENT)
Dept: PHYSICAL THERAPY | Facility: HOSPITAL | Age: 70
End: 2020-05-27
Attending: INTERNAL MEDICINE
Payer: MEDICARE

## 2020-05-27 NOTE — TELEPHONE ENCOUNTER
Patient went to [facility]/pharmacy today and they do not have script, please call patient with  at:[phone],thanks.

## 2020-05-27 NOTE — TELEPHONE ENCOUNTER
Scar @ [facility] pt picked up Zolpidem Tartrate 10 mg oral tab Q #30 today at 10:55 am.

## 2020-06-01 ENCOUNTER — APPOINTMENT (OUTPATIENT)
Dept: PHYSICAL THERAPY | Facility: HOSPITAL | Age: 70
End: 2020-06-01
Attending: INTERNAL MEDICINE
Payer: MEDICARE

## 2020-06-03 ENCOUNTER — APPOINTMENT (OUTPATIENT)
Dept: PHYSICAL THERAPY | Facility: HOSPITAL | Age: 70
End: 2020-06-03
Attending: INTERNAL MEDICINE
Payer: MEDICARE

## 2020-06-08 ENCOUNTER — APPOINTMENT (OUTPATIENT)
Dept: PHYSICAL THERAPY | Facility: HOSPITAL | Age: 70
End: 2020-06-08
Attending: INTERNAL MEDICINE
Payer: MEDICARE

## 2020-06-08 ENCOUNTER — NURSE TRIAGE (OUTPATIENT)
Dept: INTERNAL MEDICINE CLINIC | Facility: CLINIC | Age: 70
End: 2020-06-08

## 2020-06-08 ENCOUNTER — OFFICE VISIT (OUTPATIENT)
Dept: INTERNAL MEDICINE CLINIC | Facility: CLINIC | Age: 70
End: 2020-06-08
Payer: MEDICARE

## 2020-06-08 VITALS
OXYGEN SATURATION: 96 % | SYSTOLIC BLOOD PRESSURE: 122 MMHG | BODY MASS INDEX: 35.97 KG/M2 | DIASTOLIC BLOOD PRESSURE: 71 MMHG | HEART RATE: 63 BPM | TEMPERATURE: 98 F | WEIGHT: 193 LBS | HEIGHT: 61.5 IN

## 2020-06-08 DIAGNOSIS — G47.33 OBSTRUCTIVE SLEEP APNEA SYNDROME: Primary | ICD-10-CM

## 2020-06-08 DIAGNOSIS — M25.512 CHRONIC LEFT SHOULDER PAIN: ICD-10-CM

## 2020-06-08 DIAGNOSIS — G89.29 CHRONIC LEFT SHOULDER PAIN: ICD-10-CM

## 2020-06-08 DIAGNOSIS — E78.00 HIGH CHOLESTEROL: ICD-10-CM

## 2020-06-08 DIAGNOSIS — M54.2 NECK PAIN ON LEFT SIDE: ICD-10-CM

## 2020-06-08 PROCEDURE — 96372 THER/PROPH/DIAG INJ SC/IM: CPT | Performed by: INTERNAL MEDICINE

## 2020-06-08 PROCEDURE — 99214 OFFICE O/P EST MOD 30 MIN: CPT | Performed by: INTERNAL MEDICINE

## 2020-06-08 RX ORDER — KETOROLAC TROMETHAMINE 10 MG/1
10 TABLET, FILM COATED ORAL EVERY 8 HOURS
Qty: 15 TABLET | Refills: 0 | Status: SHIPPED | OUTPATIENT
Start: 2020-06-08 | End: 2021-03-29

## 2020-06-08 RX ORDER — KETOROLAC TROMETHAMINE 30 MG/ML
30 INJECTION, SOLUTION INTRAMUSCULAR; INTRAVENOUS ONCE
Status: COMPLETED | OUTPATIENT
Start: 2020-06-08 | End: 2020-06-08

## 2020-06-08 RX ADMIN — KETOROLAC TROMETHAMINE 30 MG: 30 INJECTION, SOLUTION INTRAMUSCULAR; INTRAVENOUS at 17:00:00

## 2020-06-08 NOTE — PATIENT INSTRUCTIONS
ASSESSMENT/PLAN:   Obstructive sleep apnea syndrome  (primary encounter diagnosis) Try to use full mask. High cholesterol Check blood. Neck pain on left side try PT. Hold ortho. per pt.  No motrin, ibuprofen, advil, alleve, naprosyn  with these 4. Aumente gradualmente la frecuencia de aury ejercicio [employer] caroline series de estiramientos caroline veces al día. Aumente gradualmente el tiempo que sostiene cada estiramiento de 30 a 60 segundos. Nota: Mantenga los brazos lo más inmóviles que pueda.  Co 3. Aumente gradualmente la frecuencia de aury ejercicio hasta hacer 3 series de estiramientos 3 veces al día. Aumente gradualmente el tiempo que sostiene cada estiramiento hasta 30-60 segundos. Nota: No encorve la espalda.  No importa si no logra alcanzar 6. Con la otra mano, empuje el codo elevado hacia el hombro opuesto. Evite girar la david. Detenga el movimiento cuando sienta el estiramiento. Intente mantener la posición yung 5 segundos.   7. Aumente gradualmente la frecuencia de aury ejercicio [employer] Nota: Siga todas las instrucciones especiales que le den. Si tiene dolor, deje de hacer el ejercicio. Si sigue sintiendo dolor después de detenerse, llame a mosquera proveedor de [facility].   · Párese erguido, extendiendo el dorso de la mano del lado que de · Sostenga esta posición por unos segundos, luego baje por la pared caminando con los dedos. · Repita esto 3 veces. A medida que repite el ejercicio, vaya acercándose más a la pared.   · Aumente gradualmente el tiempo que sostiene cada estiramiento hasta l Para comenzar, párese mike erguido, con los oídos, los hombros y las caderas [place]. Debe tener los pies ligeramente separados, colocados portia debajo de las caderas. Enfoque la vista directamente hacia adelante.  Párese en esta posición yung unos seg © 5232-5319 [address]. 1407 [facility] [place], 1612 [facility]. Todos los derechos reservados. Esta información no pretende sustituir la atención médica profesional. Sólo mosquera médico puede diagnosticar y tratar un problema de kellie. Acute neck and back pain usually gets better in 1 to 2 weeks. Pain related to disk disease, arthritis in the spinal joints or spinal stenosis (narrowing of the spinal canal) can become chronic and last for months or years.   Back and neck pain are common pr · When in bed, try to find a position of comfort. A firm mattress is best. Try lying flat on your back with pillows under your knees. You can also try lying on your side with your knees bent up towards your chest and a pillow between your knees.   · At [facility] Follow up with your healthcare provider, or as advised. Physical therapy or further tests may be needed. If X-rays were taken, you will be notified of any new findings that may affect your care.   Call 911  Call 911 if any of the following occur:  · Troubl · El lawanda está sometido a daniel tensión susan de intensidad baja, dalton cuando se adopta daniel peter postura o cuando el espacio de [place] no está dispuesto adecuadamente  Síntomas de distensión cervical  Por ejemplo:  · Dolor o rigidez del lawanda  · [place] © 4810-7594 [address]. 1407 [facility] [place] [facility], [facility]2 [place]. All rights reserved. This information is not intended as a substitute for professional medical care. Always follow your healthcare professional's instructions.         Suzy Suarze El dolor yvon de lawanda y de espalda suele mejorar en daniel o Joi.  El dolor relacionado con los trastornos de los discos, la artritis en las articulaciones vertebrales o la estenosis (estrechamiento) del canal medular pueden convertirse en problemas 8. Puede usar acetaminofén (Tylenol) o ibuprofeno (Motrin o Advil) para controlar el dolor, a menos que le hayan recetado otro medicamento.  [NOTA: Si tiene daniel enfermedad hepática o renal crónica, o ha tenido alguna vez daniel úlcera estomacal o sangrado abida 10. Contraiga los músculos del abdomen y levante un brazo directamente frente a usted, con la ovalles de la ankito Faviola K. Sostenga la posición por muna segundos, luego baje el brazo. Repita esto entre 10 y 21 veces.   6. Repita el ejercicio, esta vez le Para comenzar, párese mike erguido, con los oídos, los hombros y las caderas [place]. Debe tener los pies ligeramente separados, colocados portia debajo de las caderas. Enfoque la vista directamente hacia adelante.  Párese en esta posición yung unos seg Para empezar, siéntese en daniel silla, apoyando las plantas de los pies en el suelo. Desplace mosquera peso ligeramente hacia adelante para no arquear la espalda. Relájese. Mantenga los oídos, los hombros y las caderas [address]   · [employer] hombros Para comenzar, acuéstese boca arriba con las rodillas flexionadas y las plantas de los pies apoyadas en el suelo. [address] orejas, los hombros y las caderas, [place] no empuje la parte inferior de la espalda contra el piso.  Apóyese las maikel en © 4050-6255 [address]. 1407 [facility] [place], 1612 [facility]. Todos los derechos reservados. Esta información no pretende sustituir la atención médica profesional. Sólo mosquera médico puede diagnosticar y tratar un problema de kellie. Date Last Reviewed: 3/10/2016  © 7865-1562 [address]. 1407 [facility] [place], 1612 [place]. All rights reserved. This information is not intended as a substitute for professional medical care.  Always follow your healthcare professional Para empezar, siéntese en daniel silla, apoyando las plantas de los pies en el suelo. Mosquera peso debe estar desplazado ligeramente hacia adelante, de modo que mosquera cuerpo esté mike balanceado [employer] [place].  Relaje los hombros y [facility] david mike nivelada · El “desgaste” gradual de las articulaciones de la columna pueden causar daniel “artritis degenerativa” (degenerative arthritis).  Puede ocasionarle dolor en el lawanda en forma crónica u ocasional.  · Con la edad, o con pequeñas lesiones reiteradas en el cuel 3. Puede usar acetaminofén [acetaminophen] (Tylenol) o ibuprofeno [ibuprofen] (Motrin o Advil) para controlar el dolor, a menos que le hayan recetado otro medicamento.  [ Evelene [place] : Si tiene daniel enfermedad hepática o renal crónica (chronic liver or kidney disea El dolor y las lesiones en el lawanda pueden ser consecuencia de los siguientes problemas:  · Distensiones y esguinces: Las distensiones (estiramientos o desgarros de los músculos) y los esguinces (estiramientos o desgarros de los ligamentos) pueden ocasion Si tiene algún [employer] lawanda, es posible que tenga amanda o más de los siguientes síntomas:  · Tensión muscular y espasmos: Es posible que no pueda  el lawanda, los brazos o los hombros cómodamente si tiene tensión muscular o rigidez en el lawanda. La postura es el modo en que está puesto el cuerpo. Para muchos de nosotros, esto significa a veces encorvarse con la barbilla inclinada hacia adelante y con los hombros caídos.  Jessenia aury tipo de postura impide que los músculos le den un apoyo adecuado al · Trabaje al nivel de los ojos. No estire los brazos por encima de la david ni incline la david hacia atrás.   Al estar sentado   Para protegerse el lawanda al estar sentado:  · Disponga mosquera estación de trabajo de modo que la pantalla de mosquera computadora le q · When in bed, try to find a position of comfort. A firm mattress is best. Try lying flat on your back with pillows under your knees. You can also try lying on your side with your knees bent up towards your chest and a pillow between your knees.   · At [facility] · El lawanda está sometido a daniel tensión susan de intensidad baja, dalton cuando se adopta daniel peter postura o cuando el espacio de [place] no está dispuesto adecuadamente  Síntomas de distensión cervical  Por ejemplo:  · Dolor o rigidez del lawanda  · [place] © 2897-8760 [address]. 1407 [facility] [place], [facility] [place]. All rights reserved. This information is not intended as a substitute for professional medical care. Always follow your healthcare professional's instructions.         Suzy Suarez 25. Puede usar acetaminofén (Tylenol) o ibuprofeno (Motrin o Advil) para controlar el dolor, a menos que le hayan recetado otro medicamento.  [NOTA: Si tiene daniel enfermedad hepática o renal crónica, o ha tenido alguna vez daniel úlcera estomacal o sangrado gas 32. Contraiga los músculos del abdomen y levante un brazo directamente frente a usted, con la ovalles de la ankito Faviola PURCELL. Sostenga la posición por muna segundos, luego baje el brazo. Repita esto entre 10 y 21 veces.   29. Repita el ejercicio, esta vez le Para comenzar, párese mike erguido, con los oídos, los hombros y las caderas [place]. Debe tener los pies ligeramente separados, colocados portia debajo de las caderas. Enfoque la vista directamente hacia adelante.  Párese en esta posición yung unos seg Para empezar, siéntese en daniel silla, apoyando las plantas de los pies en el suelo. Desplace mosquera peso ligeramente hacia adelante para no arquear la espalda. Relájese. Mantenga los oídos, los hombros y las caderas [address]   · [employer] hombros © 5374-0214 [address]. 1407 [facility], 1612 [facility]. Todos los derechos reservados. Esta información no pretende sustituir la atención médica profesional. Sólo mosquera médico puede diagnosticar y tratar un problema de kellie. Date Last Reviewed: 3/10/2016  © 0678-7323 [address]. 1407 [facility] [place], [address]12 [place]. All rights reserved. This information is not intended as a substitute for professional medical care.  Always follow your healthcare professional Para empezar, siéntese en daniel silla, apoyando las plantas de los pies en el suelo. Mosquera peso debe estar desplazado ligeramente hacia adelante, de modo que mosquera cuerpo esté mike balanceado [employer].  Relaje los hombros y [facility] david mike nivelada 6. Puede usar acetaminofén [acetaminophen] (Tylenol) o ibuprofeno [ibuprofen] (Motrin o Advil) para controlar el dolor, a menos que le hayan recetado otro medicamento.  [ Naty Imus : Si tiene daniel enfermedad hepática o renal crónica (chronic liver or kidney disea

## 2020-06-08 NOTE — TELEPHONE ENCOUNTER
Action Requested: Summary for Provider     []  Critical Lab, Recommendations Needed  [] Need Additional Advice  []   FYI    []   Need Orders  [] Need Medications Sent to Pharmacy  []  Other     SUMMARY:     With  #[id number], patient moore
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
## 2020-06-10 ENCOUNTER — APPOINTMENT (OUTPATIENT)
Dept: PHYSICAL THERAPY | Facility: HOSPITAL | Age: 70
End: 2020-06-10
Attending: INTERNAL MEDICINE
Payer: MEDICARE

## 2020-06-15 ENCOUNTER — APPOINTMENT (OUTPATIENT)
Dept: PHYSICAL THERAPY | Facility: HOSPITAL | Age: 70
End: 2020-06-15
Attending: INTERNAL MEDICINE
Payer: MEDICARE

## 2020-06-16 ENCOUNTER — OFFICE VISIT (OUTPATIENT)
Dept: PHYSICAL THERAPY | Age: 70
End: 2020-06-16
Attending: INTERNAL MEDICINE
Payer: MEDICARE

## 2020-06-16 DIAGNOSIS — M54.2 NECK PAIN ON LEFT SIDE: ICD-10-CM

## 2020-06-16 PROCEDURE — 97162 PT EVAL MOD COMPLEX 30 MIN: CPT

## 2020-06-16 PROCEDURE — 97110 THERAPEUTIC EXERCISES: CPT

## 2020-06-16 NOTE — PROGRESS NOTES
CERVICAL SPINE EVALUATION:   Referring Physician: Perla Black MD  Associated DX:  Neck pain on left side (M54.2)   SUBJECTIVE:   PATIENT SUMMARY:  Angie Grace is a 81year old y/o female who presents to therapy today with the below symptom pres effect    MOVEMENT LOSS Toni Mod Min Nil Pain   Flexion   x     Retraction   x     Extension        Lateral flexion Right   x     Lateral Flexion  Left   x     Rotation Right   x     Rotation Left  x   x     Upper extremity AROM:   R flexion 155 deg, abduct pain.    Frequency/Duration: Patient will be seen for 2x/week or a total of 12 visits over a 90 day period.  Treatment will include: Manual Therapy, Mechanical Traction, Neuromuscular Re-education, Self-Care Home Management, Therapeutic Activities, Therapeu

## 2020-06-17 ENCOUNTER — APPOINTMENT (OUTPATIENT)
Dept: PHYSICAL THERAPY | Facility: HOSPITAL | Age: 70
End: 2020-06-17
Attending: INTERNAL MEDICINE
Payer: MEDICARE

## 2020-06-18 ENCOUNTER — OFFICE VISIT (OUTPATIENT)
Dept: PHYSICAL THERAPY | Age: 70
End: 2020-06-18
Attending: INTERNAL MEDICINE
Payer: MEDICARE

## 2020-06-18 PROCEDURE — 97110 THERAPEUTIC EXERCISES: CPT

## 2020-06-18 NOTE — PROGRESS NOTES
Associated DX:  Neck pain on left side (M54.2)     Insurance (Authorized # of Visits):  Medicare           Authorizing Physician: Ann Viramontes Next MD visit: none scheduled  Fall Risk: standard         Precautions: n/a             Subjective: Patient states

## 2020-06-22 ENCOUNTER — APPOINTMENT (OUTPATIENT)
Dept: PHYSICAL THERAPY | Facility: HOSPITAL | Age: 70
End: 2020-06-22
Attending: INTERNAL MEDICINE
Payer: MEDICARE

## 2020-06-23 ENCOUNTER — OFFICE VISIT (OUTPATIENT)
Dept: PHYSICAL THERAPY | Age: 70
End: 2020-06-23
Attending: INTERNAL MEDICINE
Payer: MEDICARE

## 2020-06-23 PROCEDURE — 97110 THERAPEUTIC EXERCISES: CPT

## 2020-06-23 NOTE — PROGRESS NOTES
Associated DX:  Neck pain on left side (M54.2)     Insurance (Authorized # of Visits):  Medicare           Authorizing Physician: Dr. Sukhwinder Gomez Next MD visit: none scheduled  Fall Risk: standard         Precautions: n/a             Subjective: \" I am getting will be independent and compliant with comprehensive HEP to maintain progress. · Patient will be able to sit for 60 minutes in the car/work without reports of neck pain.     Plan: Continue with extension based exercises     Charges: 3TE       Total Timed T

## 2020-06-25 ENCOUNTER — OFFICE VISIT (OUTPATIENT)
Dept: PHYSICAL THERAPY | Age: 70
End: 2020-06-25
Attending: INTERNAL MEDICINE
Payer: MEDICARE

## 2020-06-25 PROCEDURE — 97110 THERAPEUTIC EXERCISES: CPT

## 2020-06-25 NOTE — PROGRESS NOTES
Associated DX:  Neck pain on left side (M54.2)     Insurance (Authorized # of Visits):  Medicare           Authorizing Physician: Dr. Bruce Doe Next MD visit: none scheduled  Fall Risk: standard         Precautions: n/a             Subjective:  Patient states her shoulder range of motion. Advanced patient to deltoid strengthening today and patient able to tolerate flexion and abduction to 90 degrees. Goals:   To be reached in 4 weeks  · Pt will report improved ability to sleep without waking due to neck pa

## 2020-06-30 ENCOUNTER — OFFICE VISIT (OUTPATIENT)
Dept: PHYSICAL THERAPY | Age: 70
End: 2020-06-30
Attending: INTERNAL MEDICINE
Payer: MEDICARE

## 2020-06-30 PROCEDURE — 97110 THERAPEUTIC EXERCISES: CPT

## 2020-06-30 NOTE — PROGRESS NOTES
Associated DX:  Neck pain on left side (M54.2)     Insurance (Authorized # of Visits):  Medicare           Authorizing Physician: Dr. Ankush Andersen Next MD visit: none scheduled  Fall Risk: standard         Precautions: n/a             Subjective:  Patient states 10  Shoulder abduction to 90 deg x 10   TherEx  Cervical retraction with extension and rotation x 20  Cervical rotation with patient over pressure  x 10 L/R  Cervical SB with patient over pressure x 10 L/R  Left shoulder horizontal adduction x 10   Shoulde

## 2020-07-02 ENCOUNTER — OFFICE VISIT (OUTPATIENT)
Dept: PHYSICAL THERAPY | Age: 70
End: 2020-07-02
Attending: INTERNAL MEDICINE
Payer: MEDICARE

## 2020-07-02 PROCEDURE — 97110 THERAPEUTIC EXERCISES: CPT

## 2020-07-02 NOTE — PROGRESS NOTES
Associated DX:  Neck pain on left side (M54.2)     Insurance (Authorized # of Visits):  Medicare           Authorizing Physician: Dr. Joana Gonzales Next MD visit: none scheduled  Fall Risk: standard         Precautions: n/a             Subjective:  Patient states 10  Shoulder flexion to 90 deg x 10  Shoulder abduction to 90 deg x 10   TherEx  Cervical retraction with extension and rotation x 20  Cervical rotation with patient over pressure  x 10 L/R  Cervical SB with patient over pressure x 10 L/R  Left shoulder ho

## 2020-07-07 ENCOUNTER — OFFICE VISIT (OUTPATIENT)
Dept: PHYSICAL THERAPY | Age: 70
End: 2020-07-07
Attending: INTERNAL MEDICINE
Payer: MEDICARE

## 2020-07-07 PROCEDURE — 97110 THERAPEUTIC EXERCISES: CPT

## 2020-07-07 NOTE — PROGRESS NOTES
Associated DX:  Neck pain on left side (M54.2)     Insurance (Authorized # of Visits):  Medicare           Authorizing Physician: Dr. Ling Mitchell Next MD visit: none scheduled  Fall Risk: standard         Precautions: n/a             Subjective:  Patient states with extension and rotation x 20  Cervical rotation  x 10 L  Cervical SB x 10 L  Left shoulder adduction 2 x 10   Shoulder internal rotation to midline with towel x 10  Wall wash x 10  Shoulder flexion to 90 deg x 10  Shoulder abduction to 90 deg x 10   Th Total Timed Treatment: 40 min  Total Treatment Time: 40 min

## 2020-07-09 ENCOUNTER — OFFICE VISIT (OUTPATIENT)
Dept: PHYSICAL THERAPY | Age: 70
End: 2020-07-09
Attending: INTERNAL MEDICINE
Payer: MEDICARE

## 2020-07-09 PROCEDURE — 97110 THERAPEUTIC EXERCISES: CPT

## 2020-07-09 NOTE — PROGRESS NOTES
Associated DX:  Neck pain on left side (M54.2)     Insurance (Authorized # of Visits):  Medicare           Authorizing Physician: Dr. 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   · Patient will be able to sit for 60 minutes in the car/work wi

## 2020-07-14 ENCOUNTER — OFFICE VISIT (OUTPATIENT)
Dept: PULMONOLOGY | Facility: CLINIC | Age: 70
End: 2020-07-14
Payer: MEDICARE

## 2020-07-14 ENCOUNTER — APPOINTMENT (OUTPATIENT)
Dept: PHYSICAL THERAPY | Age: 70
End: 2020-07-14
Attending: INTERNAL MEDICINE
Payer: MEDICARE

## 2020-07-14 VITALS
BODY MASS INDEX: 35.88 KG/M2 | RESPIRATION RATE: 18 BRPM | HEART RATE: 74 BPM | OXYGEN SATURATION: 97 % | SYSTOLIC BLOOD PRESSURE: 117 MMHG | HEIGHT: 62 IN | DIASTOLIC BLOOD PRESSURE: 57 MMHG | WEIGHT: 195 LBS

## 2020-07-14 DIAGNOSIS — G47.33 OBSTRUCTIVE SLEEP APNEA SYNDROME: Primary | ICD-10-CM

## 2020-07-14 PROCEDURE — G0463 HOSPITAL OUTPT CLINIC VISIT: HCPCS | Performed by: INTERNAL MEDICINE

## 2020-07-14 PROCEDURE — 99213 OFFICE O/P EST LOW 20 MIN: CPT | Performed by: INTERNAL MEDICINE

## 2020-07-14 NOTE — PROGRESS NOTES
The patient is 32-year-old female who I know well from prior evaluation comes in now for follow-up. She feels very well with the zolpidem but she is inconsistent with using her CPAP. She is no longer smoking.   She is doing the low-dose CT screening progr

## 2020-07-16 ENCOUNTER — OFFICE VISIT (OUTPATIENT)
Dept: PHYSICAL THERAPY | Age: 70
End: 2020-07-16
Attending: INTERNAL MEDICINE
Payer: MEDICARE

## 2020-07-16 PROCEDURE — 97110 THERAPEUTIC EXERCISES: CPT

## 2020-07-16 NOTE — PROGRESS NOTES
PROGRESS NOTE  Associated DX:  Neck pain on left side (M54.2)     Insurance (Authorized # of Visits):  Medicare           Authorizing Physician: Dr. Seamus Harvey MD visit: none scheduled  Fall Risk: standard         Precautions: n/a             Subjective: pain with flexion, extension, right and left rotation  Left shoulder flexion 160 deg, at end of session 160 deg abduction, internal rotation (functional) L1  L Shoulder flexion 4+/5, L shoulder extension 4+/5, shoulder internal rotation 4+/5, shoulder exte (green): elbows out, elbows at side  X 20 TherEx  Cervical retraction with extension and rotation x 20  Shoulder internal rotation to midline with towel x 10  Wall wash x 20 (flexion and abduction)  Eccentric (10 seconds) shoulder adduction x 10 3#   Eccen translation.     Thank you for your referral. Please co-sign or sign and return this letter via fax as soon as possible to [phone].  If you have any question please contact me at Dept: [phone].     Sincerely,  Electronically signed by therapist: Rg [place]

## 2020-07-21 ENCOUNTER — OFFICE VISIT (OUTPATIENT)
Dept: PHYSICAL THERAPY | Age: 70
End: 2020-07-21
Attending: INTERNAL MEDICINE
Payer: MEDICARE

## 2020-07-21 PROCEDURE — 97110 THERAPEUTIC EXERCISES: CPT

## 2020-07-21 NOTE — PROGRESS NOTES
Associated DX:  Neck pain on left side (M54.2)     Insurance (Authorized # of Visits):  Medicare           Authorizing Physician: Dr. Mendiola Credit Next MD visit: none scheduled  Fall Risk: standard         Precautions: n/a             Subjective: Patient states Shoulder internal rotation to midline with towel x 10  Wall wash x 10  Shoulder flexion to 90 deg x 10  Shoulder abduction to 90 deg x 10   TherEx  Cervical retraction with extension and rotation x 20  Cervical rotation with patient over pressure  x 10 L pressure x 10 (increase pain)  Eccentric (10 seconds) shoulder adduction x 10 3#   Eccentric (10 seconds) shoulder flexion x 10  #3    Green theraband: overhead flexion, overhead adduction, internal rotation 2 x 10  Scapular retraction (green): elbows out,

## 2020-07-22 ENCOUNTER — APPOINTMENT (OUTPATIENT)
Dept: GENERAL RADIOLOGY | Facility: HOSPITAL | Age: 70
End: 2020-07-22
Attending: EMERGENCY MEDICINE
Payer: MEDICARE

## 2020-07-22 ENCOUNTER — APPOINTMENT (OUTPATIENT)
Dept: ULTRASOUND IMAGING | Facility: HOSPITAL | Age: 70
End: 2020-07-22
Attending: EMERGENCY MEDICINE
Payer: MEDICARE

## 2020-07-22 ENCOUNTER — HOSPITAL ENCOUNTER (EMERGENCY)
Facility: HOSPITAL | Age: 70
Discharge: HOME | End: 2020-07-23
Attending: EMERGENCY MEDICINE
Payer: MEDICARE

## 2020-07-22 DIAGNOSIS — M62.838 SPASM OF MUSCLE: Primary | ICD-10-CM

## 2020-07-22 PROCEDURE — 99284 EMERGENCY DEPT VISIT MOD MDM: CPT

## 2020-07-22 PROCEDURE — 93971 EXTREMITY STUDY: CPT | Performed by: EMERGENCY MEDICINE

## 2020-07-22 PROCEDURE — 73502 X-RAY EXAM HIP UNI 2-3 VIEWS: CPT | Performed by: EMERGENCY MEDICINE

## 2020-07-22 RX ORDER — CYCLOBENZAPRINE HCL 10 MG
10 TABLET ORAL 3 TIMES DAILY PRN
Status: DISCONTINUED | OUTPATIENT
Start: 2020-07-22 | End: 2020-07-23

## 2020-07-23 ENCOUNTER — OFFICE VISIT (OUTPATIENT)
Dept: PHYSICAL THERAPY | Age: 70
End: 2020-07-23
Attending: INTERNAL MEDICINE
Payer: MEDICARE

## 2020-07-23 VITALS
BODY MASS INDEX: 35 KG/M2 | WEIGHT: 194 LBS | TEMPERATURE: 98 F | OXYGEN SATURATION: 97 % | RESPIRATION RATE: 16 BRPM | SYSTOLIC BLOOD PRESSURE: 126 MMHG | DIASTOLIC BLOOD PRESSURE: 72 MMHG | HEART RATE: 76 BPM

## 2020-07-23 PROCEDURE — 97110 THERAPEUTIC EXERCISES: CPT

## 2020-07-23 RX ORDER — CYCLOBENZAPRINE HCL 10 MG
10 TABLET ORAL 3 TIMES DAILY PRN
Qty: 10 TABLET | Refills: 0 | Status: SHIPPED | OUTPATIENT
Start: 2020-07-23 | End: 2020-07-30

## 2020-07-23 NOTE — ED PROVIDER NOTES
Patient Seen in: [facility] AND [facility] Emergency Department      History   Patient presents with:  Pain    Stated Complaint: Rt leg pain    HPI    Patient is a 62-year-old Somali-speaking female who arrives with her son for right leg pain that started sever Resp 07/22/20 2157 18   Temp 07/22/20 2157 98.1 °F (36.7 °C)   Temp src 07/22/20 2157 Oral   SpO2 07/22/20 2157 97 %   O2 Device 07/22/20 2316 None (Room air)       Current:/76   Pulse 72   Temp 98.1 °F (36.7 °C) (Oral)   Resp 16   Wt 88 kg   SpO2

## 2020-07-23 NOTE — ED INITIAL ASSESSMENT (HPI)
Right leg pain today. Pain to back of right leg  Possibly pulled something getting out of truck today  Unable to ambulate on leg.

## 2020-07-23 NOTE — PROGRESS NOTES
DISCHARGE NOTE  Associated DX:  Neck pain on left side (M54.2)     Insurance (Authorized # of Visits):  Medicare           Authorizing Physician: Dr. Ortiz Link Next MD visit: none scheduled  Fall Risk: standard         Precautions: n/a             Subjective: 07/09/20   Tx 9 Date 07/16/20   Tx 10 Date 07/21/20   Tx 11 Date 07/23/20   Tx 12   TherEx  Cervical retraction with extension and rotation x 20  Left shoulder horizontal adduction x 10   Shoulder internal rotation to midline with towel x 10  Wall wash x 1 (target zone)  Eccentric (10 seconds) shoulder flexion x 10  #3 (target zone)  Black theraband: overhead flexion, overhead adduction, 2 x 10  Scapular retraction (green): elbows out, elbows at side  X 20 TherEx  Upper extremity bike 3' forward/3'reverse  S referral. Please co-sign or sign and return this letter via fax as soon as possible to [phone]. If you have any question please contact me at Dept: [phone].     Sincerely,  Electronically signed by therapist: Mary Page, PT, DPT, Cert. MDT     Physi

## 2020-08-20 ENCOUNTER — TELEPHONE (OUTPATIENT)
Dept: INTERNAL MEDICINE CLINIC | Facility: CLINIC | Age: 70
End: 2020-08-20

## 2020-08-26 ENCOUNTER — TELEPHONE (OUTPATIENT)
Dept: INTERNAL MEDICINE CLINIC | Facility: CLINIC | Age: 70
End: 2020-08-26

## 2020-08-26 NOTE — TELEPHONE ENCOUNTER
Per pharm, the suppositories are not covered by insurance so this rx would be 1000$. The proctozone cream however, is covered (and is what the pt has had in the past) partial rx pended. Please advise.

## 2020-08-26 NOTE — TELEPHONE ENCOUNTER
Patient states pharmacy has not received script. Hydrocortisone Acetate (ANUSOL-HC) 25 MG Rectal Suppos 60 suppository 1 8/20/2020 9/3/2020   Sig:   Place 1 suppository (25 mg total) rectally 2 (two) times daily as needed for Hemorrhoids.      [facility]

## 2020-08-28 RX ORDER — HYDROCORTISONE 25 MG/G
1 CREAM TOPICAL 2 TIMES DAILY
Qty: 28 G | Refills: 2 | Status: SHIPPED | OUTPATIENT
Start: 2020-08-28 | End: 2021-03-29

## 2020-08-28 NOTE — TELEPHONE ENCOUNTER
Spoke with son and informed new rx sent to pharmacy to be used with OTC suppository. No further questions at this time.

## 2020-09-28 PROBLEM — D22.9 ATYPICAL NEVI: Status: RESOLVED | Noted: 2019-05-24 | Resolved: 2020-09-28

## 2020-09-28 PROBLEM — Z01.419 WOMEN'S ANNUAL ROUTINE GYNECOLOGICAL EXAMINATION: Status: RESOLVED | Noted: 2018-11-09 | Resolved: 2020-09-28

## 2020-09-28 PROBLEM — Z71.85 VACCINE COUNSELING: Status: ACTIVE | Noted: 2020-09-28

## 2020-09-29 ENCOUNTER — OFFICE VISIT (OUTPATIENT)
Dept: INTERNAL MEDICINE CLINIC | Facility: CLINIC | Age: 70
End: 2020-09-29
Payer: MEDICARE

## 2020-09-29 VITALS
DIASTOLIC BLOOD PRESSURE: 68 MMHG | SYSTOLIC BLOOD PRESSURE: 123 MMHG | WEIGHT: 200.06 LBS | BODY MASS INDEX: 36.82 KG/M2 | HEART RATE: 69 BPM | OXYGEN SATURATION: 98 % | RESPIRATION RATE: 19 BRPM | HEIGHT: 62 IN | TEMPERATURE: 97 F

## 2020-09-29 DIAGNOSIS — M25.512 CHRONIC LEFT SHOULDER PAIN: ICD-10-CM

## 2020-09-29 DIAGNOSIS — K76.0 FATTY LIVER DISEASE, NONALCOHOLIC: ICD-10-CM

## 2020-09-29 DIAGNOSIS — Z00.00 ENCOUNTER FOR ANNUAL HEALTH EXAMINATION: ICD-10-CM

## 2020-09-29 DIAGNOSIS — E78.00 HIGH CHOLESTEROL: ICD-10-CM

## 2020-09-29 DIAGNOSIS — G47.33 OBSTRUCTIVE SLEEP APNEA SYNDROME: ICD-10-CM

## 2020-09-29 DIAGNOSIS — K21.9 GASTROESOPHAGEAL REFLUX DISEASE WITHOUT ESOPHAGITIS: Primary | ICD-10-CM

## 2020-09-29 DIAGNOSIS — G89.29 CHRONIC LEFT SHOULDER PAIN: ICD-10-CM

## 2020-09-29 DIAGNOSIS — R74.8 ELEVATED LIVER ENZYMES: ICD-10-CM

## 2020-09-29 DIAGNOSIS — Z71.85 VACCINE COUNSELING: ICD-10-CM

## 2020-09-29 DIAGNOSIS — R73.03 PREDIABETES: ICD-10-CM

## 2020-09-29 PROBLEM — L29.9 ITCHY SCALP: Status: ACTIVE | Noted: 2020-09-29

## 2020-09-29 LAB
APPEARANCE: CLEAR
MULTISTIX LOT#: 1044 NUMERIC
PH, URINE: 8.5 (ref 4.5–8)
SPECIFIC GRAVITY: 1.02 (ref 1–1.03)
URINE-COLOR: YELLOW
UROBILINOGEN,SEMI-QN: 0.2 MG/DL (ref 0–1.9)

## 2020-09-29 PROCEDURE — 99497 ADVNCD CARE PLAN 30 MIN: CPT | Performed by: INTERNAL MEDICINE

## 2020-09-29 PROCEDURE — 99214 OFFICE O/P EST MOD 30 MIN: CPT | Performed by: INTERNAL MEDICINE

## 2020-09-29 PROCEDURE — 81003 URINALYSIS AUTO W/O SCOPE: CPT | Performed by: INTERNAL MEDICINE

## 2020-09-29 PROCEDURE — G0439 PPPS, SUBSEQ VISIT: HCPCS | Performed by: INTERNAL MEDICINE

## 2020-09-29 RX ORDER — KETOCONAZOLE 20 MG/ML
1 SHAMPOO TOPICAL
Qty: 120 ML | Refills: 3 | Status: SHIPPED | OUTPATIENT
Start: 2020-10-01

## 2020-09-29 NOTE — PATIENT INSTRUCTIONS
ASSESSMENT/PLAN:   Gastroesophageal reflux disease without esophagitis  (primary encounter diagnosis) Stable. Careful with diet. Avoid hot spicy foods and caffeine and caffinated beverages. Careful with acidic foods. Elevate head of bed. Wait 2 hrs.  a following:   • Overweight (BMI ³25 but <30)   • Family history of diabetes   • Age 72 years or older   • History of gestational diabetes or birth of baby weighing more than 9 pounds     Covered at least every 3 years,         Glucose (mg/dL)   Date Value Density Screening      Bone density screening   Covered every 2 yrs after age 72    Covered yearly for Long term Glucocorticoid medication (Steroids) Requires diagnosis related to osteoporosis or estrogen deficiency.    Biennial benefit unless patient has h found for this or any previous visit. This may be covered with your prescription benefits, but Medicare does not cover unless Medically needed    Zoster (Not covered by Medicare Part B) No orders found for this or any previous visit.  This may be covered wi another name for adhesive capsulitis, which causes restricted movement in the shoulder. If you have frozen shoulder, this stretch may cause discomfort, especially when you first get started. A few months may pass before you achieve the results you want.  Bu becomes frozen again. So stick to your stretching program. If you have any questions, be sure to ask your doctor. Jesika last reviewed this educational content on 3/1/2018  © 1666-3178 The [employer].  [address] [facility], [address] [place] [address] movements. Note: Follow any special instructions you are given. If you feel pain, stop the exercise. If the pain continues after stopping, call your healthcare provider:  · Stand with your shoulder about 2 feet from the wall.   · Raise your arm to shoulder balanced evenly on your buttocks. Relax your shoulders and keep your head level. Avoid arching your back or rounding your shoulders. Using a chair with arms may help you keep your balance. · Raise your arms, elbows bent, to shoulder height.   · Slowly move an exercise program.   Jesika last reviewed this educational content on 11/1/2017  © 3769-6957 [employer]. 1407 [facility], [address] [place] [address]. All rights reserved.  This information is not intended as a substitute for professional me can happen simply by stretching or moving wrong, without noting pain at the time. Other causes include:  · Overexertion, lifting, pushing, pulling incorrectly or too aggressively.   · Sudden twisting, bending or stretching from an accident (car or fall), or before using medicine, especially if you have other medical problems or are taking other medicines. · You may use over-the-counter medicine to control pain, unless another pain medicine was prescribed.  If you have chronic conditions like diabetes, liver o a la columna cervical y a otros huesos. El dolor de lawanda generalmente es la consecuencia de daniel lesión en estos músculos y tendones.   Causas de distensión cervical  Distintos tipos de tensión sobre el lawanda pueden dañar andie músculos y tendones (tejidos 100.4° F (38° C) o más, o según le hayan indicado  · Empeoramiento del dolor o la rigidez  · Síntomas que no mejoran o empeoran  · Entumecimiento, hormigueo, debilidad o elsy fulgurantes que bajan a los brazos o las piernas  · Síntomas nuevos  Date Last por espasmos musculares que contribuyen al dolor. El dolor yvon de lawanda y de espalda suele mejorar en daniel o Joi.  El dolor relacionado con los trastornos de los discos, la artritis en las articulaciones vertebrales o la estenosis (estrechamiento consulte con mosquera médico antes de kami estos medicamentos.]  6. Aprenda las técnicas adecuadas para levantar objetos y no levante nada que pese más de 13 libras hasta que el dolor haya desaparecido por completo.   Programe daniel VISITA DE CONTROL con mosquera médico Repita esto entre 10 y 21 veces. 9. Repita el ejercicio, esta vez levantando el brazo hacia atrás y con la ovalles de la mano [place] arriba. Repita esto entre 10 y 21 veces. Cambie de lado y repita cada ejercicio con el otro [place].   Date Last Reviewed: 11/1 [place] le ayudará a ser más consciente de la postura correcta. · Imagínese que mosquera hombro derecho es el centro de un reloj. Con la parte más externa del hombro, trace lentamente el borde exterior del reloj.   · Muévase stephen en el sentido de las agujas del la david y el lawanda inmóviles y relajados. · Sostenga esta posición mientras cuenta hasta 10. Relájese. · Repita esto 5 veces. Para mosquera propia seguridad, consulte con mosquera proveedor de atención médica antes de iniciar cualquier programa de ejercicios. músculos. · Sostenga la posición yung 5 segundos. A continuación gire la david hacia el otro lado. · Repita el ejercicio 5 veces en cada lado. Nota: Mantenga los hombros apoyados contra el piso.  No levante o repliegue la barbilla mientras gira la ca parte inferior de la espalda contra el piso. Apóyese las maikel en la pelvis. Respire hondo y relájese. · Con el lawanda relajado, colóquese la ovalles de daniel mano en la frente.  Use la mano para hacer girar la david hacia un lado hasta sentir el estiramiento derecha. [place] levemente mosquera barbilla. 2. Incline mosquera david hacia la izquierda mirando hacia adelante. 3. Coloque mosquera mano izquierda sobre el lado derecho de mosquera david. Empuje mosquera david suavemente hacia la izquierda. Sostenga por 30 a 60 segundos.  Aplique u Repita esto 5 veces. Para mosquera propia seguridad, consulte con mosquera proveedor de atención médica antes de iniciar cualquier programa de ejercicios. Date Last Reviewed: 11/1/2017  © 5335-4508 [employer] [address].  [address] [facility] [address] que usted haya tenido daniel lesión física por impacto (por ejemplo, daniel caída o un accidente de automóvil), no suelen pedirse radiografías (X-rays) para la evaluación inicial del dolor de lawanda.  Si el dolor persiste y no responde al [employer], es p o ambos brazos. · Siente debilidad o entumecimiento en amanda o ambos brazos. · Dolor de david que va en aumento. · Hinchazón del lawanda, dificultad o dolor al tragar.   · [place] de 100.4°F (38°C) o más dorinda, o dalotn le haya indicado mosquera proveedor de atenció accidentes de automóvil, aunque también pueden ocurrir yung daniel caída o daniel lesión al practicar un deporte.   · Discos debilitados: Un acto simple dalton un estornudo o daniel tos puede causar la protuberancia (hernia) de un disco. Un disco herniado puede eje 9537-5808 [employer]. [facility]7 [facility], [facility]. Todos los derechos reservados. Esta información no pretende sustituir la atención médica profesional. Sólo mosquera médico puede diagnosticar y tratar un problema de kellie.         Pr La buena mecánica corporal ayuda a proteger el lawanda. [place] implica aprender las posiciones correctas para estar de pie, sentarse e incluso dormir.  Por lo tanto, christina lo que es mejor para mosquera lawanda y practique daniel buena mecánica corporal.  Al estar de pie de kellie. Ejercicios de flexibilidad para el hombro: Rotación externa    Melissa ejercicio de estiramiento ayuda a recuperar la flexibilidad y mell el dolor con [place]. Mientras hace el ejercicio, asegúrese de respirar profundamente.  Siga cualquier in médico puede diagnosticar y tratar un problema de kellie. Ejercicios de flexibilidad del hombro: Rotación interna    Melissa ejercicio de estiramiento restaura la flexibilidad y mell el dolor con el [place].  Mientras hace el ejercicio, asegúrese de res kellie.        Ejercicios de flexibilidad del hombro: Aducción (estiramiento horizontal)    Melissa ejercicio de estiramiento restaura la flexibilidad y mell el dolor con el [place].  Mientras hace el ejercicio, asegúrese de respirar profundamente y seguir hema kellie.        Ejercicios de flexibilidad del hombro: Rascado de la espalda     Si mejora mosquera flexibilidad, podrá disminuir mosquera dolor. Hacer ejercicios de estiramiento también puede ayudar a aumentar mosquera amplitud de movimientos sin dolor.  Mientras hace los eje sintiendo dolor después de detenerse, llame a mosquera proveedor de [facility]. · Párese con el hombro a unos 2 pies de distancia de la pared.   · Levante el brazo hasta que esté al mismo nivel que el hombro y luego “camine” por la pared con los dedos trata reservados. Esta información no pretende sustituir la atención médica profesional. Sólo mosquera médico puede diagnosticar y tratar un problema de kellie.         Ejercicio de rotación de hombros    Para comenzar, párese mike erguido, con los oídos, los hombros y PRECAUCIÓN: No christina oscilar las pesas ni las levante por encima del nivel de los hombros.  Date Last Reviewed: 11/1/2017  © 4527-2329 [employer] [address]. 1407 [facility] [place]. Todos los derechos reservados.  Esta información no través de los nervios. La mayoría de las personas tiene culebrilla solo daniel vez. Sin embargo, puede aparecer en más de daniel oportunidad. ¿Quiénes están en riesgo de tener culebrilla? Cualquier persona que haya tenido varicela puede tener culebrilla.  Sin solo le recetarán medicamentos antivirales si consulta con un proveedor de [employer] las primeras 72 horas de tener la erupción.  Sin embargo, pueden recetarse medicamentos antivirales después de 72 horas si la persona tiene un sistema inmunit tópicos, orales o intravenosos para tratar la infección. · Problemas de la vista. Si tiene [facility] aram, visite a mosquera proveedor de [facility] de inmediato. La culebrilla puede causar problemas graves en la vista, e Anette Larger.   En nena Debe recibir la segunda dosis de [employer] y seis meses después de la primera. La vacuna hace que tenga menos probabilidades de contraer culebrilla. En panda de que así sea, los síntomas serán más leves que si no hubiese recibido la [place].  Se recomiend daniellaos):  escalofríos dolor de david fiebre náuseas, vómito enrojecimiento, calor, dolor, hinchazón o comezón/picazón en el lugar de la inyección cansancio  ¿Qué puede interactuar con aury medicamento?   medicamentos que suprimen el sistema inmunológico

## 2020-09-29 NOTE — PROGRESS NOTES
HPI:    Patient ID: Kit Fernandez is a 78year old female. Kit Fernandez is a 78year old female who presents for a complete physical exam.   HPI:   Patient presents with:  Wellness Visit: annual exam       No LMP recorded.  Patient is postmenopausal. ZOLPIDEM TARTRATE 10 MG Oral Tab TOME IRMA TABLETA (10 MG EN TOTAL) POR BOCA CADA NOCHE IRLANDA SEA NECESARIO PARA DORMIR.  30 tablet 5   • Pantoprazole Sodium 40 MG Oral Tab EC Take 1 tablet (40 mg total) by mouth every morning before breakfast. 90 tablet 0 factory.      Tobacco Use      Smoking status: Former Smoker        Packs/day: 1.00        Years: 40.00        Pack years: 36        Quit date:         Years since quittin.7      Smokeless tobacco: Never Used    Substance and Sexual Activity      A 02/07/2020 09:46 AM    AST 36 02/07/2020 09:46 AM    ALT 56 02/07/2020 09:46 AM    TSH 0.91 10/13/2017 11:25 AM        Lab Results   Component Value Date/Time    CHOLEST 154 04/20/2019 09:52 AM    HDL 55 04/20/2019 09:52 AM    TRIG 106 04/20/2019 09:52 AM weakness, light-headedness, numbness and headaches. Psychiatric/Behavioral: Positive for sleep disturbance (Pt. using CPAP. ? Benefit. Uses but not sure if working right. ).  Negative for agitation, behavioral problems, confusion, decreased concentration, Medical History:   Diagnosis Date   • Acid reflux disease    • Breast mass in female    • Difficulty sleeping    • Elevated liver enzymes 10/2017   • Fatty liver disease, nonalcoholic 84/5725   • Pneumonia due to organism 2016   • Postmenopausal bleeding 1 Right Ear: Tympanic membrane, external ear and ear canal normal. No lacerations. No drainage, swelling or tenderness. No foreign bodies. No mastoid tenderness.  Tympanic membrane is not injected, not scarred, not perforated, not erythematous, not retracte rhythm, S1 normal, S2 normal, normal heart sounds, intact distal pulses and normal pulses. Pulses:       Carotid pulses are 2+ on the right side and 2+ on the left side. Radial pulses are 2+ on the right side and 2+ on the left side.         Genevie Every and normal pulse. Left elbow: She exhibits normal range of motion, no swelling, no effusion, no deformity and no laceration. No tenderness found.       Cervical back: She exhibits normal range of motion, no tenderness, no bony tenderness, no swelling, company on new mask. Mask is leaking. Alfredo. Using every night. Prediabetes Check blood. Vaccine counseling Check records of flu vaccine done last month at [place]. [employer] info. Give, Check on insurance coverage.      Chronic left shoulder pain of  for [employer] on file in [place].    Advance care planning including the explanation and discussion of advance directives standard forms performed Face to Face with patient and Family/surrogate (if present), and forms available to patient in AVS shrimp. CURRENT MEDICATIONS:     •  [START ON 10/1/2020] Ketoconazole 2 % External Shampoo, Apply 1 mL topically twice a week. •  Hydrocortisone (PROCTOZONE-HC) 2.5 % External Cream, Place 1 Application rectally 2 (two) times daily.     •  ZOLPIDEM TA Screening    Screening Method: Questionnaire  I have a problem hearing over the telephone: No I have trouble following the conversations when two or more people are talking at the same time: Sometimes   I have trouble understanding things on the TV: No I h orders for this visit:    Gastroesophageal reflux disease without esophagitis    Elevated liver enzymes    Fatty liver disease, nonalcoholic    High cholesterol    Obstructive sleep apnea syndrome  -     DME - EXTERNAL     Prediabetes    Vaccine counseling due on 10/18/2028 Update Health Maintenance if applicable    Flex Sigmoidoscopy Screen every 10 years No results found for this or any previous visit. No flowsheet data found.      Fecal Occult Blood Annually No results found for: FOB No flowsheet data foun history found This may be covered with your pharmacy  prescription benefits      Advance Care Planning done today:     She does have a Living Will but we do NOT have it on file in [facility]           She does NOT have a Power of  for [employer] on file

## 2020-09-30 ENCOUNTER — TELEPHONE (OUTPATIENT)
Dept: INTERNAL MEDICINE CLINIC | Facility: CLINIC | Age: 70
End: 2020-09-30

## 2020-09-30 NOTE — TELEPHONE ENCOUNTER
Patient states she received phone number for cpap machine at appointment yesterday. States it doesn't seem to be correct number as she is not getting an answer from anyone. Asking if there is a different number where she can call.  please advise

## 2020-10-02 ENCOUNTER — LAB ENCOUNTER (OUTPATIENT)
Dept: LAB | Facility: HOSPITAL | Age: 70
End: 2020-10-02
Attending: INTERNAL MEDICINE
Payer: MEDICARE

## 2020-10-02 DIAGNOSIS — E78.00 HIGH CHOLESTEROL: ICD-10-CM

## 2020-10-02 PROCEDURE — 36415 COLL VENOUS BLD VENIPUNCTURE: CPT

## 2020-10-02 PROCEDURE — 80061 LIPID PANEL: CPT

## 2020-10-02 PROCEDURE — 80053 COMPREHEN METABOLIC PANEL: CPT

## 2020-10-02 NOTE — PROGRESS NOTES
CMP Normal (electrolytes, kidney and liver functions), sugar is slightly elevated. Needs to get an A1c. Cannot add on. Orders written for nonfasting labs. Lipid (choilesterol) is good, except triglycerides are elevated. Goal is less than 150.   Careful

## 2020-10-02 NOTE — TELEPHONE ENCOUNTER
Spoke with patient. Explained to her that I send all information to the cpap company waiting for them to give her a call. If they don't call her on Monday I will call them to see what is going on.

## 2020-10-05 ENCOUNTER — TELEPHONE (OUTPATIENT)
Dept: INTERNAL MEDICINE CLINIC | Facility: CLINIC | Age: 70
End: 2020-10-05

## 2020-10-05 NOTE — TELEPHONE ENCOUNTER
Called patient to let her know we send the progress note to home medical for her cpap and patient asked me for her blood work results she did on 10/2/2020. Gave her the results and Dr bean.

## 2020-10-06 NOTE — TELEPHONE ENCOUNTER
Cheri Ware from  [facility] states they received information but are still missing compliance report for machine. She will also need notes for SOA stating that patient uses and benefits from her machine.  Please advise

## 2020-10-07 ENCOUNTER — LAB ENCOUNTER (OUTPATIENT)
Dept: LAB | Facility: HOSPITAL | Age: 70
End: 2020-10-07
Attending: INTERNAL MEDICINE
Payer: MEDICARE

## 2020-10-07 ENCOUNTER — OFFICE VISIT (OUTPATIENT)
Dept: ORTHOPEDICS CLINIC | Facility: CLINIC | Age: 70
End: 2020-10-07
Payer: MEDICARE

## 2020-10-07 VITALS
HEIGHT: 62 IN | DIASTOLIC BLOOD PRESSURE: 65 MMHG | BODY MASS INDEX: 36.8 KG/M2 | SYSTOLIC BLOOD PRESSURE: 118 MMHG | RESPIRATION RATE: 20 BRPM | HEART RATE: 65 BPM | WEIGHT: 200 LBS

## 2020-10-07 DIAGNOSIS — M19.012 GLENOHUMERAL ARTHRITIS, LEFT: Primary | ICD-10-CM

## 2020-10-07 DIAGNOSIS — R73.9 HYPERGLYCEMIA: ICD-10-CM

## 2020-10-07 PROBLEM — M19.019 SHOULDER ARTHRITIS: Status: ACTIVE | Noted: 2020-10-07

## 2020-10-07 PROCEDURE — 20610 DRAIN/INJ JOINT/BURSA W/O US: CPT | Performed by: ORTHOPAEDIC SURGERY

## 2020-10-07 PROCEDURE — 99204 OFFICE O/P NEW MOD 45 MIN: CPT | Performed by: ORTHOPAEDIC SURGERY

## 2020-10-07 PROCEDURE — 83036 HEMOGLOBIN GLYCOSYLATED A1C: CPT

## 2020-10-07 PROCEDURE — 36415 COLL VENOUS BLD VENIPUNCTURE: CPT

## 2020-10-07 RX ORDER — TRIAMCINOLONE ACETONIDE 40 MG/ML
40 INJECTION, SUSPENSION INTRA-ARTICULAR; INTRAMUSCULAR ONCE
Status: COMPLETED | OUTPATIENT
Start: 2020-10-07 | End: 2020-10-07

## 2020-10-07 RX ORDER — A/SINGAPORE/GP1908/2015 IVR-180 (AN A/MICHIGAN/45/2015 (H1N1)PDM09-LIKE VIRUS, A/HONG KONG/4801/2014, NYMC X-263B (H3N2) (AN A/HONG KONG/4801/2014-LIKE VIRUS), AND B/BRISBANE/60/2008, WILD TYPE (A B/BRISBANE/60/2008-LIKE VIRUS) 15; 15; 15 UG/.5ML; UG/.5ML; UG/.5ML
INJECTION, SUSPENSION INTRAMUSCULAR
COMMUNITY
Start: 2020-08-28 | End: 2021-03-29

## 2020-10-07 RX ORDER — CHLORHEXIDINE GLUCONATE 0.12 MG/ML
RINSE ORAL
COMMUNITY
Start: 2020-09-28 | End: 2021-01-21

## 2020-10-07 RX ORDER — LOTEPREDNOL ETABONATE 2 MG/ML
SUSPENSION/ DROPS OPHTHALMIC
COMMUNITY
Start: 2020-09-28

## 2020-10-07 RX ADMIN — TRIAMCINOLONE ACETONIDE 40 MG: 40 INJECTION, SUSPENSION INTRA-ARTICULAR; INTRAMUSCULAR at 17:58:00

## 2020-10-07 NOTE — PROGRESS NOTES
NURSING INTAKE COMMENTS: Patient presents with:  Shoulder Pain: Left - Language line called and spoke with [place] [facility] [place] [id number] - onset in April - no injury - has x-rays in the system - rates pain as 5-8/10 on and off - pt is R handed       HPI: This 70 year 10 MG Oral Tab Take 1 tablet (10 mg total) by mouth every 8 (eight) hours. (Patient not taking: Reported on 7/14/2020 ) 15 tablet 0   • ZOLPIDEM TARTRATE 10 MG Oral Tab TOME IRMA TABLETA (10 MG EN TOTAL) POR BOCA CADA NOCHE IRLANDA SEA NECESARIO PARA DORMIR.  3 cough, asthma, wheezing  CARDIOVASCULAR: denies chest pain, leg cramps with exertion, palpitations, leg swelling  GI: denies abdominal pain, nausea, vomiting, diarrhea, constipation, hematochezia, worsening heartburn or stomach ulcers  : denies dysuria, .0 09/20/2019      Lab Results   Component Value Date     (H) 10/02/2020    BUN 12 10/02/2020    CREATSERUM 0.66 10/02/2020    GFRNAA 91 10/02/2020    GFRAA 105 10/02/2020        Assessment and Plan:  Diagnoses and all orders for this visit:

## 2020-10-07 NOTE — PROGRESS NOTES
Per verbal order from NICHOLAS Baum, draw up 3ml of 0.5% Marcaine & 2ml 1% lidocaine and 1ml of Kenalog 40 for cortisone injection to left shoulder. Masah Gutierrez    Patient provided education handout for cortisone injection.    Patient left off

## 2020-10-09 NOTE — TELEPHONE ENCOUNTER
Spoke with Angelica Sage I explained where to find the information on the progress notes. She states is all completed now.

## 2020-10-09 NOTE — TELEPHONE ENCOUNTER
Please see message jonas, I already sent progress notes, the order they provided with your signature, sleep study, and the order you did.

## 2020-11-01 RX ORDER — MONTELUKAST SODIUM 10 MG/1
10 TABLET ORAL NIGHTLY PRN
Qty: 90 TABLET | Refills: 0 | Status: SHIPPED | OUTPATIENT
Start: 2020-11-01 | End: 2021-02-05

## 2020-11-20 ENCOUNTER — TELEPHONE (OUTPATIENT)
Dept: PULMONOLOGY | Facility: CLINIC | Age: 70
End: 2020-11-20

## 2020-12-02 RX ORDER — ZOLPIDEM TARTRATE 10 MG/1
10 TABLET ORAL NIGHTLY PRN
Qty: 30 TABLET | Refills: 5 | Status: SHIPPED | OUTPATIENT
Start: 2020-12-02 | End: 2021-01-25

## 2020-12-02 NOTE — TELEPHONE ENCOUNTER
Patient now requesting Zolpidem from Dr. Joana Gonzales as she states difficulty sleeping and out of medication.

## 2020-12-07 RX ORDER — ZOLPIDEM TARTRATE 10 MG/1
TABLET ORAL
Qty: 30 TABLET | Refills: 0 | OUTPATIENT
Start: 2020-12-07

## 2020-12-10 ENCOUNTER — HOSPITAL ENCOUNTER (OUTPATIENT)
Dept: GENERAL RADIOLOGY | Facility: HOSPITAL | Age: 70
Discharge: HOME | End: 2020-12-10
Attending: ORTHOPAEDIC SURGERY
Payer: MEDICARE

## 2020-12-10 ENCOUNTER — OFFICE VISIT (OUTPATIENT)
Dept: ORTHOPEDICS CLINIC | Facility: CLINIC | Age: 70
End: 2020-12-10
Payer: MEDICARE

## 2020-12-10 VITALS — BODY MASS INDEX: 37.17 KG/M2 | HEIGHT: 62 IN | WEIGHT: 202 LBS

## 2020-12-10 DIAGNOSIS — M25.511 RIGHT SHOULDER PAIN, UNSPECIFIED CHRONICITY: ICD-10-CM

## 2020-12-10 DIAGNOSIS — M25.511 RIGHT SHOULDER PAIN, UNSPECIFIED CHRONICITY: Primary | ICD-10-CM

## 2020-12-10 DIAGNOSIS — M54.12 CERVICAL RADICULOPATHY: ICD-10-CM

## 2020-12-10 PROCEDURE — 99214 OFFICE O/P EST MOD 30 MIN: CPT | Performed by: ORTHOPAEDIC SURGERY

## 2020-12-10 PROCEDURE — 73030 X-RAY EXAM OF SHOULDER: CPT | Performed by: ORTHOPAEDIC SURGERY

## 2020-12-10 PROCEDURE — G0463 HOSPITAL OUTPT CLINIC VISIT: HCPCS | Performed by: ORTHOPAEDIC SURGERY

## 2020-12-10 RX ORDER — METHYLPREDNISOLONE 4 MG/1
TABLET ORAL
Qty: 1 PACKAGE | Refills: 0 | Status: SHIPPED | OUTPATIENT
Start: 2020-12-10 | End: 2021-03-29

## 2020-12-10 RX ORDER — MELOXICAM 7.5 MG/1
7.5 TABLET ORAL DAILY
Qty: 30 TABLET | Refills: 1 | Status: SHIPPED | OUTPATIENT
Start: 2020-12-10 | End: 2021-01-25

## 2020-12-10 NOTE — PROGRESS NOTES
NURSING INTAKE COMMENTS: Patient presents with: Follow - Up: pt stts left shoulder is better and now she has right shoulder pain 0/10 today      HPI: This 78year old female presents today with complaints of right shoulder pain.   Atraumatic onset approxim Take 1 tablet (7.5 mg total) by mouth daily. 30 tablet 1   • methylPREDNISolone 4 MG Oral Tablet Therapy Pack Take as per instruction sheet. Do not take anti-inflammatory meds such as ibuprofen or naproxen while on this med.  1 Package 0   • Zolpidem Suyapa Guillermo Smoking status: Former Smoker        Packs/day: 1.00        Years: 40.00        Pack years: 36        Quit date:         Years since quittin.9      Smokeless tobacco: Never Used    Substance and Sexual Activity      Alcohol use: No      Drug use: negative. Drop test liftoff apprehension are negative. Biceps triceps strength is intact 5 out of 5 equal symmetric bilaterally. Deep tendon reflexes are 2+/4 bilateral biceps triceps and brachial radialis.    strength is intact bilateral upper extre (7.5 mg total) by mouth daily. -     methylPREDNISolone 4 MG Oral Tablet Therapy Pack; Take as per instruction sheet. Do not take anti-inflammatory meds such as ibuprofen or naproxen while on this med.         Assessment: Cervical radiculopathy right upper

## 2021-01-04 ENCOUNTER — TELEPHONE (OUTPATIENT)
Dept: PHYSICAL THERAPY | Facility: HOSPITAL | Age: 71
End: 2021-01-04

## 2021-01-06 ENCOUNTER — OFFICE VISIT (OUTPATIENT)
Dept: PHYSICAL THERAPY | Age: 71
End: 2021-01-06
Attending: PHYSICIAN ASSISTANT
Payer: MEDICARE

## 2021-01-06 DIAGNOSIS — M25.511 RIGHT SHOULDER PAIN, UNSPECIFIED CHRONICITY: ICD-10-CM

## 2021-01-06 DIAGNOSIS — M54.12 CERVICAL RADICULOPATHY: ICD-10-CM

## 2021-01-06 PROCEDURE — 97162 PT EVAL MOD COMPLEX 30 MIN: CPT | Performed by: PHYSICAL THERAPIST

## 2021-01-06 PROCEDURE — 97110 THERAPEUTIC EXERCISES: CPT | Performed by: PHYSICAL THERAPIST

## 2021-01-06 NOTE — PROGRESS NOTES
CERVICAL SPINE EVALUATION:   Referring Physician: Mali Mcarthur PA-C  Diagnosis: Cervical radiculopathy (M54.12)  Right shoulder pain, unspecified chronicity (M25.511) Date of Service: 1/6/2021   Date of Onset: November 2020        SUBJECTIVE:   MOHAN head and shoulders;  Posture correction reduced (R) shoulder ache/pain initially and abolished in the (R) UE.     ROM:  (Pre-test symptom: 6/10 (R) shoulder/upper trapezius area)  Cervical :             Movement Loss Pain   Protrusion Nil loss NE   Flexion changing pain levels. Patient was advised of these findings, precautions, and treatment options and has agreed to actively participate in planning and for this course of care.     Thank you for your referral. Please co-sign or sign and return this letter

## 2021-01-07 NOTE — PATIENT INSTRUCTIONS
Home exercise program instruction: Posture correction in sitting (with lumbar roll), and supine (with 1 pillow upto shoulder and with/without cervical roll), Seated or supine c/s retraction 10 reps with self overpressure every 2-3 hours (4-6x/day

## 2021-01-08 ENCOUNTER — OFFICE VISIT (OUTPATIENT)
Dept: PHYSICAL THERAPY | Age: 71
End: 2021-01-08
Attending: PHYSICIAN ASSISTANT
Payer: MEDICARE

## 2021-01-08 DIAGNOSIS — M25.511 RIGHT SHOULDER PAIN, UNSPECIFIED CHRONICITY: ICD-10-CM

## 2021-01-08 DIAGNOSIS — M54.12 CERVICAL RADICULOPATHY: ICD-10-CM

## 2021-01-08 PROCEDURE — 97110 THERAPEUTIC EXERCISES: CPT | Performed by: PHYSICAL THERAPIST

## 2021-01-08 NOTE — PROGRESS NOTES
Date: 1/8/2021         Dx:  Cervical radiculopathy (M54.12)  Right shoulder pain, unspecified chronicity (M25.511)         Authorized # of Visits:  12       Referring MD: Josef Xiong  Next MD visit: none scheduled    Medication Changes since last visit?: No    Ledbetter directional preference toward extension of the lower cervical spine. Added manual OP in sitting and supine with full range and only endrange stretch. No symptoms produced the (R) shoulder/UE.   Progressed to supine extension without symptoms again produce

## 2021-01-13 ENCOUNTER — OFFICE VISIT (OUTPATIENT)
Dept: PHYSICAL THERAPY | Age: 71
End: 2021-01-13
Attending: PHYSICIAN ASSISTANT
Payer: MEDICARE

## 2021-01-13 PROCEDURE — 97110 THERAPEUTIC EXERCISES: CPT | Performed by: PHYSICAL THERAPIST

## 2021-01-13 NOTE — PROGRESS NOTES
Date: 1/13/2021         Dx:  Cervical radiculopathy (M54.12)  Right shoulder pain, unspecified chronicity (M25.511)         Authorized # of Visits:  12       Referring MD: Gustavo Witt  Next MD visit: none scheduled    Medication Changes since last visit?: No    S roll x 1 min; relaxed   + c/s retraction x 10 reps; NE     - less pillow x 10 reps; NE   + c/s retraction x 10 reps; NE     + manual c/s retraction 2 x 10 reps; NE (cueing to relax neck)     + extension 5 reps x 5 cts ea; NE relaxed Scifit: UE only L8 fwd/ Time: 46 mins

## 2021-01-14 ENCOUNTER — APPOINTMENT (OUTPATIENT)
Dept: PHYSICAL THERAPY | Age: 71
End: 2021-01-14
Attending: PHYSICIAN ASSISTANT
Payer: MEDICARE

## 2021-01-14 ENCOUNTER — OFFICE VISIT (OUTPATIENT)
Dept: PHYSICAL THERAPY | Age: 71
End: 2021-01-14
Attending: PHYSICIAN ASSISTANT
Payer: MEDICARE

## 2021-01-14 PROCEDURE — 97110 THERAPEUTIC EXERCISES: CPT | Performed by: PHYSICAL THERAPIST

## 2021-01-14 NOTE — PROGRESS NOTES
Date: 1/14/2021         Dx:  Cervical radiculopathy (M54.12)  Right shoulder pain, unspecified chronicity (M25.511)         Authorized # of Visits:  12       Referring MD: Michael Samayoa  Next MD visit: none scheduled    Medication Changes since last visit?: No    S 10 reps; NE   + c/s retraction x 10 reps; NE     + manual c/s retraction 2 x 10 reps; NE (cueing to relax neck)     + extension 5 reps x 5 cts ea; NE relaxed Scifit: UE only L8 fwd/bwkd x 5 mins ea; NE    Sitting posture correction with lumbar roll    Seat recreational activities. 3. Patient to have WNL of CROM in all directions to allow a return to all normal activities (sit, drive, look/turn her head to the (R)) without compensation of deviation.    4. Patient to consistently have good posture to promote

## 2021-01-20 ENCOUNTER — OFFICE VISIT (OUTPATIENT)
Dept: PHYSICAL THERAPY | Age: 71
End: 2021-01-20
Attending: PHYSICIAN ASSISTANT
Payer: MEDICARE

## 2021-01-20 PROCEDURE — 97110 THERAPEUTIC EXERCISES: CPT | Performed by: PHYSICAL THERAPIST

## 2021-01-20 NOTE — PROGRESS NOTES
Date: 1/20/2021         Dx:  Cervical radiculopathy (M54.12)  Right shoulder pain, unspecified chronicity (M25.511)         Authorized # of Visits:  12       Referring MD: Rashel Segal  Next MD visit: none scheduled    Medication Changes since last visit?: No    S c/s retraction x 10 reps; NE     - less pillow x 10 reps; NE   + c/s retraction x 10 reps; NE     + manual c/s retraction 2 x 10 reps; NE (cueing to relax neck)     + extension 5 reps x 5 cts ea; NE relaxed Scifit: UE only L8 fwd/bwkd x 5 mins ea; NE    Si pain in the (R) shoulder at the endrange of motion and at the endrange of cervical extension. She is able to bring her (R) UE up her back (IR) higher now. Her (L) shoulder also has limitation with IR motion.   She was reminded to do her c/s extension in s

## 2021-01-21 ENCOUNTER — OFFICE VISIT (OUTPATIENT)
Dept: OBGYN CLINIC | Facility: CLINIC | Age: 71
End: 2021-01-21
Payer: MEDICARE

## 2021-01-21 VITALS — BODY MASS INDEX: 37 KG/M2 | WEIGHT: 201.38 LBS

## 2021-01-21 DIAGNOSIS — Z01.419 WELL WOMAN EXAM WITH ROUTINE GYNECOLOGICAL EXAM: Primary | ICD-10-CM

## 2021-01-21 DIAGNOSIS — Z12.31 ENCOUNTER FOR SCREENING MAMMOGRAM FOR BREAST CANCER: ICD-10-CM

## 2021-01-21 PROCEDURE — G0101 CA SCREEN;PELVIC/BREAST EXAM: HCPCS | Performed by: OBSTETRICS & GYNECOLOGY

## 2021-01-21 RX ORDER — HYDROCORTISONE 25 MG/G
1 CREAM TOPICAL 2 TIMES DAILY
Qty: 28 G | Refills: 0 | Status: SHIPPED | OUTPATIENT
Start: 2021-01-21 | End: 2021-10-18

## 2021-01-21 RX ORDER — CLOTRIMAZOLE AND BETAMETHASONE DIPROPIONATE 10; .64 MG/G; MG/G
1 CREAM TOPICAL 2 TIMES DAILY
Qty: 45 G | Refills: 0 | Status: SHIPPED | OUTPATIENT
Start: 2021-01-21 | End: 2022-01-21

## 2021-01-21 NOTE — PROGRESS NOTES
HPI:    Patient ID: Zohreh Galeana is a 78year old year old female. HPI  Well woman visit  No complaints. Denies any vaginal bleeding. Denies any breast problems or concerns. Voiding well normal bowel movements.   Up-to-date with her screening testin (Patient not taking: Reported on 1/21/2021 ) 30 tablet 5   • FLUAD QUADRIVALENT 0.5 ML Intramuscular Prefilled Syringe      • Hydrocortisone (PROCTOZONE-HC) 2.5 % External Cream Place 1 Application rectally 2 (two) times daily.  (Patient not taking: Melyssa Kimble diagnosis)  Plan: Normal examination. Maintain healthy lifestyle and habits with balanced diet of fruits, vegetables, nuts, fish, meat, and carbs. Limit fats and excess carbs. Exercise regularly; 30 minutes a day at least 5 days a week.   Calcium and vit MCG/ACT (0.025%) Nasal Solution, 2 sprays by Each Nare route 2 (two) times daily. , Disp: 1 Inhaler, Rfl: 1    •  methylPREDNISolone 4 MG Oral Tablet Therapy Pack, Take as per instruction sheet.  Do not take anti-inflammatory meds such as ibuprofen or naprox

## 2021-01-22 ENCOUNTER — OFFICE VISIT (OUTPATIENT)
Dept: PHYSICAL THERAPY | Age: 71
End: 2021-01-22
Attending: PHYSICIAN ASSISTANT
Payer: MEDICARE

## 2021-01-22 PROBLEM — Z12.31 BREAST CANCER SCREENING BY MAMMOGRAM: Status: ACTIVE | Noted: 2021-01-22

## 2021-01-22 PROCEDURE — 97110 THERAPEUTIC EXERCISES: CPT | Performed by: PHYSICAL THERAPIST

## 2021-01-22 NOTE — PROGRESS NOTES
Date: 1/22/2021         Dx:  Cervical radiculopathy (M54.12)  Right shoulder pain, unspecified chronicity (M25.511)         Authorized # of Visits:  12       Referring MD: Rafael Fischer  Next MD visit: none scheduled    Medication Changes since last visit?: No    S relax neck)     + extension 5 reps x 5 cts ea; NE relaxed Scifit: UE only L8 fwd/bwkd x 5 mins ea; NE    Sitting posture correction with lumbar roll    Seated: c/s retraction with self OP x 10 reps; NE     + extension 2 x 10 reps; P, NW mid neck     - less extension 10 reps x 10 cts ea; NE tired    Prone: (B) UE h.abduction 10 reps x 10 cts ea; NE tired                 Assessment/HEP:   Citlalli easily fatigues and report pain at the end motion of (R) shoulder IR but no worse as a result.   It explained that

## 2021-01-23 ENCOUNTER — APPOINTMENT (OUTPATIENT)
Dept: PHYSICAL THERAPY | Age: 71
End: 2021-01-23
Attending: PHYSICIAN ASSISTANT
Payer: MEDICARE

## 2021-01-25 ENCOUNTER — TELEPHONE (OUTPATIENT)
Dept: INTERNAL MEDICINE CLINIC | Facility: CLINIC | Age: 71
End: 2021-01-25

## 2021-01-25 ENCOUNTER — OFFICE VISIT (OUTPATIENT)
Dept: INTERNAL MEDICINE CLINIC | Facility: CLINIC | Age: 71
End: 2021-01-25
Payer: MEDICARE

## 2021-01-25 VITALS
RESPIRATION RATE: 19 BRPM | DIASTOLIC BLOOD PRESSURE: 67 MMHG | HEIGHT: 62 IN | TEMPERATURE: 98 F | HEART RATE: 71 BPM | SYSTOLIC BLOOD PRESSURE: 114 MMHG | WEIGHT: 206.38 LBS | BODY MASS INDEX: 37.98 KG/M2 | OXYGEN SATURATION: 96 %

## 2021-01-25 DIAGNOSIS — G47.33 OBSTRUCTIVE SLEEP APNEA SYNDROME: ICD-10-CM

## 2021-01-25 DIAGNOSIS — Z12.31 BREAST CANCER SCREENING BY MAMMOGRAM: ICD-10-CM

## 2021-01-25 DIAGNOSIS — R73.03 PREDIABETES: ICD-10-CM

## 2021-01-25 DIAGNOSIS — Z71.85 VACCINE COUNSELING: ICD-10-CM

## 2021-01-25 DIAGNOSIS — M25.511 CHRONIC RIGHT SHOULDER PAIN: ICD-10-CM

## 2021-01-25 DIAGNOSIS — G89.29 CHRONIC RIGHT SHOULDER PAIN: ICD-10-CM

## 2021-01-25 DIAGNOSIS — E78.00 HIGH CHOLESTEROL: Primary | ICD-10-CM

## 2021-01-25 PROCEDURE — 99214 OFFICE O/P EST MOD 30 MIN: CPT | Performed by: INTERNAL MEDICINE

## 2021-01-25 RX ORDER — TIZANIDINE 4 MG/1
4 TABLET ORAL NIGHTLY
Qty: 30 TABLET | Refills: 1 | Status: SHIPPED | OUTPATIENT
Start: 2021-01-25 | End: 2021-03-26

## 2021-01-25 RX ORDER — MELOXICAM 7.5 MG/1
7.5 TABLET ORAL DAILY
Qty: 30 TABLET | Refills: 1 | Status: SHIPPED | OUTPATIENT
Start: 2021-01-25

## 2021-01-25 RX ORDER — AMITRIPTYLINE HYDROCHLORIDE 75 MG/1
75 TABLET, FILM COATED ORAL NIGHTLY
Qty: 90 TABLET | Refills: 1 | Status: SHIPPED | OUTPATIENT
Start: 2021-01-25 | End: 2021-05-27 | Stop reason: DRUGHIGH

## 2021-01-25 NOTE — TELEPHONE ENCOUNTER
Malaysian speaking - pt called and said she would like a refill on Rx Amitriptyline 75MG. Pt is requesting a higher dose.  Please advise

## 2021-01-25 NOTE — PATIENT INSTRUCTIONS
ASSESSMENT/PLAN:   High cholesterol  (primary encounter diagnosis) Check blood. Vaccine counseling Shingrix vaccine at pharmacy per pt. Prediabetes Lower carbs.      Breast cancer screening by mammogram Normal mammogram. Continue self breast exam ev · Pain can be sharp, stabbing, shooting, aching, cramping, or burning  · Movement, standing, bending, lifting, sitting, or walking may worsen the pain  · Pain can be localized to one spot or area, or it can be more generalized  · Pain can spread or radiate · Don't sit for long periods, as in long car rides or other travel. This puts more stress on the lower back than standing or walking.   · During the first 24 to 72 hours after an injury, apply an ice pack to the painful area for 20 minutes and then remove i · Pain becomes worse or spreads into your arms or legs  · Weakness, numbness or pain in one or both arms or legs  · Numbness in the groin area  · Difficulty walking  · Fever of 100.4ºF (38ºC) or higher, or as directed by your healthcare provider  Date Last Melissa problema con frecuencia mejora por sí mismo. Los tratamientos apuntan a aliviar el dolor y la inflamación  y a aumentar el rango de movimiento del lawanda. Los posibles tratamientos incluyen:  · Calmantes del dolor recetados o de venta devon.  [place] a 4. Repita el ejercicio muna veces, o según le indiquen. Sugerencia: No arquee la espalda ni redondee los hombros hacia adelante. Date Last Reviewed: 5/1/2016  © 4740-9731 [employer] [address]. [address] [facility], [address] [facility].  [facility] 2. Cuando esté en la cama, trate de encontrar daniel posición cómoda. Es aconsejable que use un colchón firme. Intente acostarse boca arriba con almohadas debajo de las rodillas.  También puede intentar acostarse de lado con las [address] · [place] Liz[place] de 100.4°F (38°C) o más dorinda, o dalton le haya indicado mosquera proveedor de atención médica  Date Last Reviewed: 7/1/2016  © 5425-6090 [employer] [address]. [address]7 [facility], [facility] [place]. Todos los derechos reservados.  Esta información Para comenzar, párese mike erguido, con los oídos, los hombros y las caderas [place]. Debe tener los pies ligeramente separados, colocados portia debajo de las caderas. Enfoque la vista directamente hacia adelante.  Párese en esta posición yung unos seg © 1921-8923 [employer] [address]. [address] [facility] [place] [place], [address]12 [facility]. Todos los derechos reservados. Esta información no pretende sustituir la atención médica profesional. Sólo mosquera médico puede diagnosticar y tratar un problema de kellie. © 4292-7259 [employer] [address]. 1407 [facility], 1612 [facility]. Todos los derechos reservados. Esta información no pretende sustituir la atención médica profesional. Sólo mosquera médico puede diagnosticar y tratar un problema de kellie. Date Last Reviewed: 11/1/2017  © 0528-3865 [employer] [address]. [address] [facility] [place] [place], [facility]2 [facility]. Todos los derechos reservados.  Esta información no pretende sustituir la atención médica profesional. Sólo mosquera médico puede diagnosticar y trata · Con el lawanda relajado, colóquese la ovalles de daniel mano en la frente. Use la mano para hacer girar la david hacia un lado hasta sentir el estiramiento en los músculos. No christina aury ejercicio si siente dolor. · Sostenga la posición yung 5 segundos.  A 3. Coloque mosquera mano izquierda sobre el lado derecho de mosquera david. Empuje mosquera david suavemente hacia la izquierda. Sostenga por 30 a 60 segundos. Aplique daniel presión suave para aumentar el estiramiento. No fuerce mosquera david a la posición.   4. Regrese Aung Bahena Fell Para mosquera propia seguridad, consulte con mosquera proveedor de atención médica antes de iniciar cualquier programa de ejercicios. Date Last Reviewed: 11/1/2017  © 5972-5377 [employer] [address]. 1407 [facility], [address] [place] [address] [place].  Todos los [facility] A menos que usted haya tenido daniel lesión física por impacto (por ejemplo, daniel caída o un accidente de automóvil), no suelen pedirse radiografías (X-rays) para la evaluación inicial del dolor de lawanda.  Si el dolor persiste y no responde al tratamiento médi · El dolor [place]F o se esparce a amanda o ambos brazos. · Siente debilidad o entumecimiento en amanda o ambos brazos. · Dolor de david que va en aumento. · Hinchazón del lawanda, dificultad o dolor al tragar.   · Oneita Darner de 100.4°F (38°C) o más dorinda, o [employer]	Charles [place] · Latigazo cervical y otras lesiones: Pueden producirse latigazos cervicales cuando un impacto sacude la david de un modo que obliga al lawanda a doblarse demasiado hacia adelante (hiperflexión) y luego a retroceder excesivamente (hiperextensión).  Estos do · Dolor  El dolor sordo en la david o el lawanda, los elsy agudos y la hinchazón del tejido blando en el lawanda y los hombros son síntomas comunes.  Si tiene presión en los nervios del lawanda, es posible que sienta dolor en los brazos o en las maikel (dol Para revisar mosqeura postura, use un katy de cuerpo entero. Para comenzar, póngase de pie de manera normal. A continuación, retroceda lentamente hasta ponerse con la espalda contra la pared. ¿Hay espacio entre mosquera david y la pared?  ¿Kym caer los hombros? ¿Ti · Siéntese derecho, con las plantas de los pies apoyadas en el piso. Si los pies no le llegan al piso, use un reposapiés. · No permanezca sentado ni manejando yung largos períodos. [place] descansos frecuentes.   Al dormir   Para protegerse el lawanda al do Nota: Mantenga los brazos lo más inmóviles que pueda. Con el tiempo, intente girar el cuerpo un poco más para aumentar el estiramiento. Jessenia tenga cuidado de no torcer la espalda.   Hombro rígido  El hombro rígido (llamado también capsulitis retráctil) es u Nota: No encorve la espalda. No importa si no logra alcanzar el medio de la espalda con la Isa. En efrain panda, comience el estiramiento con la mano tan cerca del medio de la espalda dalton le sea posible llevarla.      Hombro doloroso  El hombro doloroso (llam 11. Aumente gradualmente la frecuencia de aury ejercicio hasta hacer 3 series de estiramientos 3 veces al día. Aumente gradualmente el tiempo que sostiene cada estiramiento hasta 30-60 segundos.   Nota: Asegúrese de empujar el codo horizontalmente frente al · Tírese el extremo de daniel toalla sobre el hombro. Con la [place], agarre efrain extremo detrás de mosquera espalda. · Jale suavemente la toalla con mosquera brazo delantero. Deje que el brazo posterior se deslice hacia arriba hasta donde pueda hacerlo cómodamente.  Se PRECAUCIÓN: Ronna aury ejercicio de estiramiento únicamente si se lo recomienda mosquera proveedor de [employer]. No lo ronna si se acaba de lesionar. Date Last Reviewed:   © 2267-8139 [employer] [address].  [address] [facility], [address] [place] · Imagínese que mosquera hombro derecho es el centro de un reloj. Con la parte más externa del hombro, trace lentamente el borde exterior del reloj. · Muévase stephen en el sentido de las agujas del reloj, luego en sentido contrario.   · Repita esto de caroline a ci Los ejercicios de fortalecimiento ayudan a estabilizar el hombro lesionado, aumentando la fuerza de los músculos que le dan apoyo. Para entrar en calor, christina stephen ejercicios de flexibilidad (estiramiento).   · Con los pies y las maikel alineados con los h 15. Aumente gradualmente la frecuencia de aury ejercicio [employer] caroline series de estiramientos caroline veces al día. Aumente gradualmente el tiempo que sostiene cada estiramiento de 30 a 60 segundos. Nota: Mantenga los brazos lo más inmóviles que pueda.  C 23. Aumente gradualmente la frecuencia de aury ejercicio hasta hacer 3 series de estiramientos 3 veces al día. Aumente gradualmente el tiempo que sostiene cada estiramiento hasta 30-60 segundos. Nota: No encorve la espalda.  No importa si no logra alcanzar 17. Con la otra mano, empuje el codo elevado hacia el hombro opuesto. Evite girar la david. Detenga el movimiento cuando sienta el estiramiento. Intente mantener la posición yung 5 segundos.   18. Aumente gradualmente la frecuencia de aury ejercicio has Nota: Siga todas las instrucciones especiales que le den. Si tiene dolor, deje de hacer el ejercicio. Si sigue sintiendo dolor después de detenerse, llame a mosquera proveedor de [facility].   · Párese erguido, extendiendo el dorso de la mano del lado que de · Sostenga esta posición por unos segundos, luego baje por la pared caminando con los dedos. · Repita esto 3 veces. A medida que repite el ejercicio, vaya acercándose más a la pared.   · Aumente gradualmente el tiempo que sostiene cada estiramiento hasta l Para comenzar, párese mike erguido, con los oídos, los hombros y las caderas [place]. Debe tener los pies ligeramente separados, colocados portia debajo de las caderas. Enfoque la vista directamente hacia adelante.  Párese en esta posición yung unos seg © 8661-2551 [employer] [address]. 1407 [facility], 1612 [facility]. Todos los derechos reservados. Esta información no pretende sustituir la atención médica profesional. Sólo mosquera médico puede diagnosticar y tratar un problema de kellie.

## 2021-01-25 NOTE — PROGRESS NOTES
HPI:    Patient ID: Dave Barton is a 78year old female. Has been compliant with medications. Exercise level: not active and has been following low salt diet. Weight has been up.   Wt Readings from Last 3 Encounters:  01/25/21 : 206 lb 6.4 oz (93.6 shoulder and does not seem to be working. Has not followed up regarding the right shoulder since that is relatively newer per pt. Shoulder Pain   The pain is present in the right shoulder. This is a recurrent problem.  The current episode started more Dispense Refill   • Meloxicam 7.5 MG Oral Tab Take 1 tablet (7.5 mg total) by mouth daily. 30 tablet 1   • tiZANidine HCl 4 MG Oral Tab Take 1 tablet (4 mg total) by mouth nightly.  30 tablet 1   • clotrimazole-betamethasone (LOTRISONE) 1-0.05 % External Cr OTHER (SEE COMMENTS)    Comment:Allergy Test Screening    HISTORY:  Past Medical History:   Diagnosis Date   • Acid reflux disease    • Breast mass in female    • Difficulty sleeping    • Elevated liver enzymes 10/2017   • Fatty liver disease, matias ill. No distress. She is not intubated. Neck: Trachea normal and phonation normal. No thyroid mass and no thyromegaly present.    Cardiovascular: Normal rate, regular rhythm, S1 normal, S2 normal, normal heart sounds, intact distal pulses and normal pulse tenderness, no swelling, no edema, no deformity, no laceration and normal pulse. Right hand: Decreased sensation is not present in the medial distribution. She exhibits no finger abduction, no thumb/finger opposition and no wrist extension trouble. Sig: Take 1 tablet (4 mg total) by mouth nightly. Imaging & Referrals:  ORTHOPEDIC - INTERNAL     F/U 3 months.

## 2021-01-25 NOTE — TELEPHONE ENCOUNTER
Spoke with pt with assist of Language Line who states she takes Amitriptyline to help her sleep. Pt is asking for refill and asking if Dr Ernestina Robles can increase the dosage, per pt 75 mg is no helping her enough to sleep.     Per pt \"Dr Ernestina Robles told me I can g

## 2021-01-27 ENCOUNTER — OFFICE VISIT (OUTPATIENT)
Dept: PHYSICAL THERAPY | Age: 71
End: 2021-01-27
Attending: PHYSICIAN ASSISTANT
Payer: MEDICARE

## 2021-01-27 PROCEDURE — 97110 THERAPEUTIC EXERCISES: CPT | Performed by: PHYSICAL THERAPIST

## 2021-01-27 NOTE — PROGRESS NOTES
Date: 1/27/2021         Dx:  Cervical radiculopathy (M54.12)  Right shoulder pain, unspecified chronicity (M25.511)         Authorized # of Visits:  12       Referring MD: Lela Guerra  Next MD visit: none scheduled    Medication Changes since last visit?: No    S retraction x 10 reps; NE     + manual c/s retraction 2 x 10 reps; NE (cueing to relax neck)     + extension 5 reps x 5 cts ea; NE relaxed Scifit: UE only L8 fwd/bwkd x 5 mins ea; NE    Sitting posture correction with lumbar roll    Seated: c/s retraction w eccentric IR with blueTB x 10+6 reps x 10 eccen ct ea; P, NW    Prone: (B) UE extension 10 reps x 10 cts ea; NE tired    Prone: (B) UE h.abduction 10 reps x 10 cts ea; NE tired           Scifit: UE only L9 fwd/bwkd x 5 mins ea; NE    Sitting posture correc patient education. Charges:  TherEx x 3       Total Timed Treatment: 49 mins Total Treatment Time: 50 mins

## 2021-01-28 ENCOUNTER — APPOINTMENT (OUTPATIENT)
Dept: PHYSICAL THERAPY | Age: 71
End: 2021-01-28
Attending: PHYSICIAN ASSISTANT
Payer: MEDICARE

## 2021-01-29 ENCOUNTER — OFFICE VISIT (OUTPATIENT)
Dept: PHYSICAL THERAPY | Age: 71
End: 2021-01-29
Attending: PHYSICIAN ASSISTANT
Payer: MEDICARE

## 2021-01-29 PROCEDURE — 97110 THERAPEUTIC EXERCISES: CPT | Performed by: PHYSICAL THERAPIST

## 2021-01-29 NOTE — PROGRESS NOTES
Date: 1/29/2021         Dx:  Cervical radiculopathy (M54.12)  Right shoulder pain, unspecified chronicity (M25.511)         Authorized # of Visits:  12       Referring MD: Lela Guerra  Next MD visit: none scheduled    Medication Changes since last visit?: No    S neck)     + extension 5 reps x 5 cts ea; NE relaxed Scifit: UE only L8 fwd/bwkd x 5 mins ea; NE    Sitting posture correction with lumbar roll    Seated: c/s retraction with self OP x 10 reps; NE     + extension 2 x 10 reps; P, NW mid neck     - less pain reps x 10 cts ea; NE tired    Prone: (B) UE h.abduction 10 reps x 10 cts ea; NE tired           Scifit: UE only L9 fwd/bwkd x 5 mins ea; NE    Sitting posture correction with lumbar roll    Seated: c/s extension x 10 reps; P, NW mid neck    (R) UE repeated 3. Patient to have WNL of CROM in all directions to allow a return to all normal activities (sit, drive, look/turn her head to the (R)) without compensation of deviation.    4. Patient to consistently have good posture to promote their symptom free condit

## 2021-01-30 ENCOUNTER — APPOINTMENT (OUTPATIENT)
Dept: PHYSICAL THERAPY | Age: 71
End: 2021-01-30
Attending: PHYSICIAN ASSISTANT
Payer: MEDICARE

## 2021-02-01 ENCOUNTER — PATIENT MESSAGE (OUTPATIENT)
Dept: INTERNAL MEDICINE CLINIC | Facility: CLINIC | Age: 71
End: 2021-02-01

## 2021-02-02 NOTE — TELEPHONE ENCOUNTER
From: Fidel Schofield  To: Megan Meza MD  Sent: 2/1/2021 8:21 PM CST  Subject: Non-Urgent Blanca Pair Dr Neal Meza I setup my chart for my mom Fidel Schofield. My mom was asking me when will she be able to get her covid vaccine.  Please let m

## 2021-02-03 ENCOUNTER — APPOINTMENT (OUTPATIENT)
Dept: PHYSICAL THERAPY | Age: 71
End: 2021-02-03
Attending: PHYSICIAN ASSISTANT
Payer: MEDICARE

## 2021-02-04 ENCOUNTER — OFFICE VISIT (OUTPATIENT)
Dept: PHYSICAL THERAPY | Age: 71
End: 2021-02-04
Attending: PHYSICIAN ASSISTANT
Payer: MEDICARE

## 2021-02-04 PROCEDURE — 97110 THERAPEUTIC EXERCISES: CPT | Performed by: PHYSICAL THERAPIST

## 2021-02-04 NOTE — PROGRESS NOTES
Date: 2/4/2021         Dx:  Cervical radiculopathy (M54.12)  Right shoulder pain, unspecified chronicity (M25.511)         Authorized # of Visits:  12       Referring MD: Henry Jimenez  Next MD visit: none scheduled    Medication Changes since last visit?: No    Ledbetter 1 pillow upto shoulder and c/s roll x 1 min; relaxed   + c/s retraction x 10 reps; NE     - less pillow x 10 reps; NE   + c/s retraction x 10 reps; NE     + manual c/s retraction 2 x 10 reps; NE (cueing to relax neck)     + extension 5 reps x 5 cts ea; NE roll    Seated: c/s extension x 10 reps; P, NW mid neck    (R) UE repeated IR with belt OP x 10 reps; P, NW    (R) shoulder eccentric IR with blueTB x 10+6 reps x 10 eccen ct ea; P, NW    Prone: (B) UE extension 10 reps x 10 cts ea; NE tired    Prone: (B) (R) UE stability and strengthening overhead without producing pain. She is tired/fatigued after her session but no report of pain. She was taught to add table/mat overpressure to repeated (R) shoulder IR to progress her self overpressure.   She was remind

## 2021-02-05 RX ORDER — MONTELUKAST SODIUM 10 MG/1
10 TABLET ORAL NIGHTLY PRN
Qty: 90 TABLET | Refills: 0 | Status: SHIPPED | OUTPATIENT
Start: 2021-02-05 | End: 2021-05-14

## 2021-02-10 ENCOUNTER — OFFICE VISIT (OUTPATIENT)
Dept: PHYSICAL THERAPY | Age: 71
End: 2021-02-10
Attending: PHYSICIAN ASSISTANT
Payer: MEDICARE

## 2021-02-10 PROCEDURE — 97110 THERAPEUTIC EXERCISES: CPT | Performed by: PHYSICAL THERAPIST

## 2021-02-10 NOTE — PROGRESS NOTES
Date: 2/4/2021         Dx:  Cervical radiculopathy (M54.12)  Right shoulder pain, unspecified chronicity (M25.511)         Authorized # of Visits:  12       Referring MD: Marilee Holguin  Next MD visit: none scheduled    Medication Changes since last visit?: No    Ledbetter 1 pillow upto shoulder and c/s roll x 1 min; relaxed   + c/s retraction x 10 reps; NE     - less pillow x 10 reps; NE   + c/s retraction x 10 reps; NE     + manual c/s retraction 2 x 10 reps; NE (cueing to relax neck)     + extension 5 reps x 5 cts ea; NE roll    Seated: c/s extension x 10 reps; P, NW mid neck    (R) UE repeated IR with belt OP x 10 reps; P, NW    (R) shoulder eccentric IR with blueTB x 10+6 reps x 10 eccen ct ea; P, NW    Prone: (B) UE extension 10 reps x 10 cts ea; NE tired    Prone: (B) 5 mins ea; NE    Seated: c/s extension x 10 reps; NE     + self OP (wiggles) 5 reps x 5 mobs; NE    Repeated (R) UE IR with mat OP x 10 reps; P, NW     (R) UE eccen IR with blackTB  15 reps x 10 eccen ct ea; NE tired    (R) UE D1D2 flexion/extension blueTB

## 2021-02-12 ENCOUNTER — OFFICE VISIT (OUTPATIENT)
Dept: PHYSICAL THERAPY | Age: 71
End: 2021-02-12
Attending: PHYSICIAN ASSISTANT
Payer: MEDICARE

## 2021-02-12 ENCOUNTER — APPOINTMENT (OUTPATIENT)
Dept: PHYSICAL THERAPY | Age: 71
End: 2021-02-12
Attending: INTERNAL MEDICINE
Payer: MEDICARE

## 2021-02-12 PROCEDURE — 97110 THERAPEUTIC EXERCISES: CPT | Performed by: PHYSICAL THERAPIST

## 2021-02-12 NOTE — PROGRESS NOTES
Date: 2/12/2021         Dx:  Cervical radiculopathy (M54.12)  Right shoulder pain, unspecified chronicity (M25.511)         Authorized # of Visits:  12       Referring MD: Jonathon Park  Next MD visit: none scheduled    Medication Changes since last visit?: No    S and c/s roll x 1 min; relaxed   + c/s retraction x 10 reps; NE     - less pillow x 10 reps; NE   + c/s retraction x 10 reps; NE     + manual c/s retraction 2 x 10 reps; NE (cueing to relax neck)     + extension 5 reps x 5 cts ea; NE relaxed Scifit: UE only extension x 10 reps; P, NW mid neck    (R) UE repeated IR with belt OP x 10 reps; P, NW    (R) shoulder eccentric IR with blueTB x 10+6 reps x 10 eccen ct ea; P, NW    Prone: (B) UE extension 10 reps x 10 cts ea; NE tired    Prone: (B) UE h.abduction 10 re fwd/bwkd x 5 mins ea; NE    Seated: c/s extension x 10 reps; NE     + self OP (wiggles) 5 reps x 5 mobs; NE    Repeated (R) UE IR with mat OP x 10 reps; P, NW     (R) UE eccen IR with blackTB  15 reps x 10 eccen ct ea; NE tired    (R) UE D1D2 flexion/exten

## 2021-02-16 ENCOUNTER — OFFICE VISIT (OUTPATIENT)
Dept: PULMONOLOGY | Facility: CLINIC | Age: 71
End: 2021-02-16
Payer: MEDICARE

## 2021-02-16 VITALS
DIASTOLIC BLOOD PRESSURE: 79 MMHG | SYSTOLIC BLOOD PRESSURE: 133 MMHG | OXYGEN SATURATION: 100 % | HEART RATE: 90 BPM | TEMPERATURE: 99 F | BODY MASS INDEX: 37.36 KG/M2 | WEIGHT: 203 LBS | HEIGHT: 62 IN

## 2021-02-16 DIAGNOSIS — G47.33 OBSTRUCTIVE SLEEP APNEA SYNDROME: Primary | ICD-10-CM

## 2021-02-16 PROCEDURE — 99214 OFFICE O/P EST MOD 30 MIN: CPT | Performed by: INTERNAL MEDICINE

## 2021-02-16 RX ORDER — ZOLPIDEM TARTRATE 10 MG/1
10 TABLET ORAL NIGHTLY PRN
Qty: 30 TABLET | Refills: 5 | Status: SHIPPED | OUTPATIENT
Start: 2021-02-16

## 2021-02-16 NOTE — PROGRESS NOTES
The patient is a 44-year-old female who Jose from prior evaluation comes in now to follow-up.   She notes that she is doing well with her sleep apnea and that she is vigilant with use of the CPAP machine every night all night and she is benefiting from

## 2021-02-19 ENCOUNTER — APPOINTMENT (OUTPATIENT)
Dept: PHYSICAL THERAPY | Age: 71
End: 2021-02-19
Attending: INTERNAL MEDICINE
Payer: MEDICARE

## 2021-02-26 ENCOUNTER — HOSPITAL ENCOUNTER (OUTPATIENT)
Dept: MAMMOGRAPHY | Facility: HOSPITAL | Age: 71
Discharge: HOME | End: 2021-02-26
Attending: OBSTETRICS & GYNECOLOGY
Payer: MEDICARE

## 2021-02-26 ENCOUNTER — HOSPITAL ENCOUNTER (OUTPATIENT)
Dept: CT IMAGING | Facility: HOSPITAL | Age: 71
Discharge: HOME | End: 2021-02-26
Attending: INTERNAL MEDICINE
Payer: MEDICARE

## 2021-02-26 DIAGNOSIS — Z12.31 ENCOUNTER FOR SCREENING MAMMOGRAM FOR BREAST CANCER: ICD-10-CM

## 2021-02-26 DIAGNOSIS — Z87.891 PERSONAL HISTORY OF TOBACCO USE, PRESENTING HAZARDS TO HEALTH: ICD-10-CM

## 2021-02-26 PROCEDURE — 71271 CT THORAX LUNG CANCER SCR C-: CPT | Performed by: INTERNAL MEDICINE

## 2021-02-26 PROCEDURE — 77067 SCR MAMMO BI INCL CAD: CPT | Performed by: OBSTETRICS & GYNECOLOGY

## 2021-02-26 PROCEDURE — 77063 BREAST TOMOSYNTHESIS BI: CPT | Performed by: OBSTETRICS & GYNECOLOGY

## 2021-03-03 ENCOUNTER — APPOINTMENT (OUTPATIENT)
Dept: PHYSICAL THERAPY | Age: 71
End: 2021-03-03
Attending: PHYSICIAN ASSISTANT
Payer: MEDICARE

## 2021-03-04 DIAGNOSIS — Z23 NEED FOR VACCINATION: ICD-10-CM

## 2021-03-09 DIAGNOSIS — Z87.891 PERSONAL HISTORY OF TOBACCO USE, PRESENTING HAZARDS TO HEALTH: Primary | ICD-10-CM

## 2021-03-17 RX ORDER — PANTOPRAZOLE SODIUM 40 MG/1
40 TABLET, DELAYED RELEASE ORAL
Qty: 90 TABLET | Refills: 0 | Status: SHIPPED | OUTPATIENT
Start: 2021-03-17 | End: 2021-08-10

## 2021-03-26 RX ORDER — TIZANIDINE 4 MG/1
4 TABLET ORAL NIGHTLY
Qty: 30 TABLET | Refills: 0 | Status: SHIPPED | OUTPATIENT
Start: 2021-03-26 | End: 2021-04-28

## 2021-03-29 ENCOUNTER — OFFICE VISIT (OUTPATIENT)
Dept: INTERNAL MEDICINE CLINIC | Facility: CLINIC | Age: 71
End: 2021-03-29
Payer: MEDICARE

## 2021-03-29 VITALS
OXYGEN SATURATION: 99 % | BODY MASS INDEX: 37.91 KG/M2 | SYSTOLIC BLOOD PRESSURE: 116 MMHG | HEART RATE: 76 BPM | DIASTOLIC BLOOD PRESSURE: 72 MMHG | WEIGHT: 206 LBS | HEIGHT: 62 IN | TEMPERATURE: 98 F | RESPIRATION RATE: 17 BRPM

## 2021-03-29 DIAGNOSIS — T78.40XA ALLERGIC REACTION, INITIAL ENCOUNTER: ICD-10-CM

## 2021-03-29 DIAGNOSIS — R21 RASH: Primary | ICD-10-CM

## 2021-03-29 PROCEDURE — 99214 OFFICE O/P EST MOD 30 MIN: CPT | Performed by: INTERNAL MEDICINE

## 2021-03-29 RX ORDER — CLOBETASOL PROPIONATE 0.5 MG/ML
1 LOTION TOPICAL 2 TIMES DAILY
Qty: 59 ML | Refills: 0 | Status: SHIPPED | OUTPATIENT
Start: 2021-03-29 | End: 2021-11-18

## 2021-03-29 RX ORDER — PREDNISONE 1 MG/1
5 TABLET ORAL 2 TIMES DAILY
Qty: 10 TABLET | Refills: 0 | Status: SHIPPED | OUTPATIENT
Start: 2021-03-29 | End: 2021-04-03

## 2021-04-28 RX ORDER — TIZANIDINE 4 MG/1
4 TABLET ORAL NIGHTLY
Qty: 30 TABLET | Refills: 0 | Status: SHIPPED | OUTPATIENT
Start: 2021-04-28 | End: 2021-12-27

## 2021-05-04 ENCOUNTER — MED REC SCAN ONLY (OUTPATIENT)
Dept: INTERNAL MEDICINE CLINIC | Facility: CLINIC | Age: 71
End: 2021-05-04

## 2021-05-14 RX ORDER — MONTELUKAST SODIUM 10 MG/1
10 TABLET ORAL NIGHTLY PRN
Qty: 90 TABLET | Refills: 0 | Status: SHIPPED | OUTPATIENT
Start: 2021-05-14 | End: 2021-08-10

## 2021-05-27 ENCOUNTER — TELEPHONE (OUTPATIENT)
Dept: INTERNAL MEDICINE CLINIC | Facility: CLINIC | Age: 71
End: 2021-05-27

## 2021-05-27 ENCOUNTER — OFFICE VISIT (OUTPATIENT)
Dept: INTERNAL MEDICINE CLINIC | Facility: CLINIC | Age: 71
End: 2021-05-27
Payer: MEDICARE

## 2021-05-27 VITALS
TEMPERATURE: 99 F | WEIGHT: 206.19 LBS | HEIGHT: 62 IN | SYSTOLIC BLOOD PRESSURE: 118 MMHG | DIASTOLIC BLOOD PRESSURE: 74 MMHG | RESPIRATION RATE: 18 BRPM | OXYGEN SATURATION: 96 % | BODY MASS INDEX: 37.94 KG/M2 | HEART RATE: 69 BPM

## 2021-05-27 DIAGNOSIS — E78.00 HIGH CHOLESTEROL: ICD-10-CM

## 2021-05-27 DIAGNOSIS — J39.2 THROAT DRY: ICD-10-CM

## 2021-05-27 DIAGNOSIS — R74.8 ELEVATED LIVER ENZYMES: Primary | ICD-10-CM

## 2021-05-27 DIAGNOSIS — M19.019 SHOULDER ARTHRITIS: ICD-10-CM

## 2021-05-27 DIAGNOSIS — R73.03 PREDIABETES: ICD-10-CM

## 2021-05-27 DIAGNOSIS — M54.12 CERVICAL RADICULOPATHY: ICD-10-CM

## 2021-05-27 DIAGNOSIS — Z71.85 VACCINE COUNSELING: ICD-10-CM

## 2021-05-27 DIAGNOSIS — G47.33 OBSTRUCTIVE SLEEP APNEA SYNDROME: ICD-10-CM

## 2021-05-27 PROCEDURE — 99214 OFFICE O/P EST MOD 30 MIN: CPT | Performed by: INTERNAL MEDICINE

## 2021-05-27 RX ORDER — CLINDAMYCIN AND BENZOYL PEROXIDE 10; 50 MG/G; MG/G
1 GEL TOPICAL NIGHTLY
Qty: 50 G | Refills: 1 | Status: SHIPPED | OUTPATIENT
Start: 2021-05-27 | End: 2022-05-22

## 2021-05-27 RX ORDER — AMITRIPTYLINE HYDROCHLORIDE 100 MG/1
100 TABLET, FILM COATED ORAL NIGHTLY
Qty: 90 TABLET | Refills: 1 | Status: SHIPPED | OUTPATIENT
Start: 2021-05-27 | End: 2021-11-18

## 2021-05-27 NOTE — PROGRESS NOTES
HPI:    Patient ID: Brooks Rosa is a 78year old female. S/P PT. Hypertension  Patient is here for follow up of hypertension. BP at home: not check. Has been compliant with medications.   Exercise level: somewhat active and has been following low Status post PT for neck and shoulder and was compliant with therapy. But does not seem to make a difference.         Review of Systems   Constitutional: Negative for activity change, appetite change, chills, diaphoresis, fatigue, fever and unexpected deborah (10 mg total) by mouth nightly as needed. 90 tablet 0   • tiZANidine HCl 4 MG Oral Tab Take 1 tablet (4 mg total) by mouth nightly. 30 tablet 0   • Clobetasol Propionate (CLOBEX) 0.05 % External Lotion Apply 1 Application topically 2 (two) times daily.  61 Past Surgical History:   Procedure Laterality Date   • BREAST SURGERY Right 2000    Removed Mass from breast two times Benign.     • COLONOSCOPY  10/2018   • COLONOSCOPY N/A 10/18/2018    Procedure: COLONOSCOPY, POSSIBLE BIOPSY, POSSIBLE POLYPECTOMY 06199 effusion. There is no impacted cerumen. No foreign body. No mastoid tenderness. No PE tube. No hemotympanum. Tympanic membrane is not injected, scarred, perforated, erythematous, retracted or bulging. Tympanic membrane has normal mobility.       Nose: deformity, effusion or lacerations. Normal range of motion. Left elbow: No swelling, deformity, effusion or lacerations. Normal range of motion. No tenderness.       Right wrist: No swelling, deformity, effusion, lacerations, tenderness, bony tendernes about mask. Vaccine counseling bringing card of Covid vaccine was done for Vivino at next office visit. Prediabetes Check blood. Shoulder arthritis try to increase Elavil to 100 mg at night. Monitor blood pressures. Follow-up with physiatry.   C

## 2021-05-27 NOTE — PATIENT INSTRUCTIONS
ASSESSMENT/PLAN:   Elevated liver enzymes  (primary encounter diagnosis) Check blood. High cholesterol Check blood. Obstructive sleep apnea syndrome Check with company about mask.     Vaccine counseling bringing card of Covid vaccine was done for Pf 11/1/2017  © 0019-3473 [employer]. 1407 [facility], [address] [place] [address]. Todos los derechos reservados.  Esta información no pretende sustituir la atención médica profesional. Sólo mosquera médico puede diagnosticar y tratar un problema de sal becki vieyra en sentido contrario. · Repita esto de caroline a muna veces. Cambsandra de shante. Date Last Reviewed: 3/1/2018  © 4780-3869 [employer] [address]. 1407 [facility], [employer]. Todos los derechos reservados.  Esta información no Date Last Reviewed: 11/1/2017  © 3146-9974 [employer] [address]. 1407 [facility], [facility]. Todos los derechos reservados.  Esta información no pretende sustituir la atención médica profesional. Sólo mosquera médico puede diagnosticar y tra david. Date Last Reviewed: 11/1/2017  © 1279-8560 [employer] [address]. 1407 [facility], [facility]. Todos los derechos reservados.  Esta información no pretende sustituir la atención médica profesional. Sólo mosquera médico puede diagnostica estiramiento en los músculos. No christina aury ejercicio si siente dolor. · Sostenga la posición yung 5 segundos. A continuación gire la david hacia el otro lado. · Repita el ejercicio 5 veces en cada lado.   Nota: Mantenga los hombros apoyados contra el Aplique daniel presión suave para aumentar el estiramiento. No fuerce mosquera david a la posición. 4. Regrese mosquera david y lawanda a la posición neutral.  5. Repita aury [address] instrucciones que haya recibido.   6. Cambie de lado y repita d [address]. Todos los derechos reservados. Esta información no pretende sustituir la atención médica profesional. Sólo mosquera médico puede diagnosticar y tratar un problema de kellie.         Dolor En El Aurora [Neck Pain, No Trauma]  Hay varias causas posibles por l posible que le soliciten radiografías (X-rays) y otras pruebas más adelante. [address]:  1. Descanse y relaje los músculos. Emplee daniel almohada cómoda para apoyar la david y mantener la columna en daniel posición neutra.  Hazel Sailors no [place] atención médica. Date Last Reviewed: 7/1/2016  © 3858-1132 [employer] [address]. 1407 [facility], [facility] [place] [place]. Todos los derechos reservados.  Esta información no pretende sustituir la atención médica profesional. Sólo mosquera médico puede ismael ejercer presión sobre el nervio y producir dolor. Con [employer], los discos también pueden sufrir degeneración (pérdida de espesor).  Los discos aplanados no amortiguan mike las vértebras y pueden causar que éstas se froten entre sí, comprimiendo los nervio kellie.        Protección del lawanda: Postura y mecánica corporal    Proteger el lawanda de las lesiones y del dolor requiere practicar el mantenimiento de daniel buena postura y mecánica corporal. [place] puede requerir la corrección de malos hábitos relacionados corporal.  Al estar de pie   Para protegerse el lawanda al estar de pie:  · Transporte los objetos cerca del cuerpo. · Mantenga los oídos y los hombros 44 [facility] está de pie o camina.   · Para agacharse, doble las piernas por la rodilla manteniendo a nearby nerve. Back and neck pain may appear after a sudden twisting or bending force (such as in a car accident), or sometimes after a simple awkward movement. In either case, muscle spasm is often present and adds to the pain.   Acute neck and back pain head and keeps the spine in a neutral position. The position of the head should not be tilted forward or backward. · When in bed, try to find a position of comfort. A firm mattress is best. Try lying flat on your back with pillows under your knees.  You ca heavy machinery. Follow-up care  Follow up with your healthcare provider, or as advised. Physical therapy or further tests may be needed. If X-rays were taken, you will be notified of any new findings that may affect your care.   Call 911  Call 911 if any intensidad baja, dalton cuando se adopta daniel peter postura o cuando el espacio de [place] no está dispuesto adecuadamente  Síntomas de distensión cervical  Por ejemplo:  · Dolor o rigidez del lawanda  · Dolor de hombros o en la parte dorinda de la espalda  · Espa y Jane Munguia)    15. Siéntese en daniel silla con ambos pies apoyados planos en el piso. También puede hacer aury ejercicio de pie. Relaje los hombros.  13. Yolanda en línea recta hacia adelante. Suavemente deslice mosquera mentón en forma recta hacia atrás.  Es un movimie atrás.  PARA EL DOLOR DE ESPALDA: Es posible que deba permanecer en la cama los primeros días.  Jessenia tan pronto pueda, debe empezar a sentarse o caminar para evitar los problemas asociados con el descanso prolongado en la cama (debilidad muscular, empeorami médica que necesita.]  Busque Prontamente Atención Médica  si algo de lo siguiente ocurre:  · El dolor Hussain [place] o se extiende a los brazos o a las piernas  · Debilidad, entumecimiento o dolor en amanda o ambos brazos o piernas  · Pérdida del control del intest hombro y la parte superior de la espalda  Para comenzar, párese mike erguido, con los oídos, los hombros y las caderas [place]. Debe tener los pies ligeramente separados, colocados portia debajo de las caderas.  Enfoque la vista directamente hacia adelante atención médica profesional. Sólo mosquera médico puede diagnosticar y tratar un problema de kellie. Ejercicios para los hombros:       Para empezar, siéntese en daniel silla, apoyando las plantas de los pies en el suelo.  Mosquera peso debe estar desplazado ligeram kellie. Aproximación de hombros    Para empezar, siéntese en daniel silla, apoyando las plantas de los pies en el suelo. Desplace mosquera peso ligeramente hacia adelante para no arquear la espalda. Relájese.  Mantenga los oídos, los hombros y las caderas Arizmendi Hannah problema de kellie. Ejercicios para el lawanda: Levantamientos de brazos            Para comenzar, acuéstese boca arriba con las rodillas flexionadas y las plantas de los pies apoyadas en el suelo.  Wiliam Moors las [place], los hombros y las francisco dolly palacios. Date Last Reviewed: 11/1/2017  © 4841-2732 [employer] [address]. [address], [address] [facility]. Todos los derechos reservados.  Esta información no pretende sustituir la atención médica profesional. Sólo s las instrucciones que haya recibido. Desafíese  [place] un extremo de daniel toalla debajo de mosquera brazo mikael. Luego lleve el otro extremo hasta cubrir mosquera hombro derecho y [place] meta la toalla debajo de John.  Jale de la toalla hacia abajo de mosquera Loreda Ply puede doler sin que haya lesión:  · Un pequeño movimiento repentino del lawanda puede provocarle un esguince (distención [sprain]) jordana de los ligamentos o de los músculos. Dormir con el lawanda apoyado en daniel posición incómoda también puede causarle eso. hacia adelante ni hacia atrás. Es posible que daniel toalla enrollada le brinde un buen apoyo.   2. Algunas personas encuentran alivio al aplicarse calor (daniel ducha caliente, un baño caliente o daniel almohadilla térmica) y recibir un masaje , [place] un problema de kellie. Los trastornos del lawanda        Si usted tiene dolor de lawanda, no es el único: muchas personas tienen dolor de lawanda en algún momento de mosquera rashaad.  Ciertos problemas dalton las malas posturas, algunas lesiones y el desgaste natu dolor.  · Articulaciones debilitadas: El envejecimiento y las lesiones pueden producir degeneración gradual de las articulaciones. La degeneración de las articulaciones también puede producir fricción [employer] vértebras.  [place] puede causar la formación de y sostiene mosquera cuerpo. Los siguientes consejos pueden ayudarle a mejorar mosquera postura y mecánica corporal.  ¿Qué es la postura y por qué es importante? La postura es el modo en que está puesto el cuerpo.  Para muchos de nosotros, esto significa a veces encorv [facility] y estirarse para alcanzar los objetos. · Trabaje al nivel de los ojos. No estire los brazos por encima de la david ni incline la david hacia atrás.   Al estar sentado   Para protegerse el lawanda al estar sentado:  · Disponga mosquera estación de tr manténgalo firmemente contra mosquera cuerpo. 18. Parado en el mismo lugar, gire el cuerpo alejándolo del melida de la giancarlo. Detenga el movimiento cuando sienta el estiramiento en el hombro. Al principio, intente mantener la posición yung muna segundos.   1 de la mano que tiene detrás de la espalda. Empuje suavemente hacia arriba con la mano ahuecada hasta que sienta el estiramiento del hombro. Intente mantener la posición yung 5  segundos.   26. Aumente gradualmente la frecuencia de aury ejercicio hasta alexander hombro. 21. Con la otra mano, empuje el codo elevado hacia el hombro opuesto. Evite girar la david. Detenga el movimiento cuando sienta el estiramiento. Intente mantener la posición yung 5 segundos.   25. Aumente gradualmente la frecuencia de aury ejer a mosquera proveedor de [facility]. · Párese erguido, extendiendo el dorso de la mano del lado que desea estirar sobre la parte inferior de la espalda. · Tírese el extremo de daniel toalla sobre el hombro.  Con la [place], agarre efrain extremo detrás de mosquera es estiramiento hasta llegar a 30 segundos. PRECAUCIÓN: Ronna aury ejercicio de estiramiento únicamente si se lo recomienda mosquera proveedor de [facility]. No lo ronna si se acaba de lesionar. Date Last Reviewed:   © 7546-7069 [employer] [address]. postura correcta. · Imagínese que mosquera hombro derecho es el centro de un reloj. Con la parte más externa del hombro, trace lentamente el borde exterior del reloj. · Muévase stephen en el sentido de las agujas del reloj, luego en sentido contrario.   · Repit fuerza de los [employer] dan apoyo. Para entrar en calor, christina stephen ejercicios de flexibilidad (estiramiento).   · Con los pies y las maikel alineados con los hombros, ponga las nicola sobre la pared y párese a daniel distancia equivalente a la de un br

## 2021-05-27 NOTE — TELEPHONE ENCOUNTER
Can we check about a different mask for patient due to dry throat with mask. She does have a bubble or with oxygen on the CPAP machine. The sleep study was done in 2018 since not been 5 years they may not give her a new machine.

## 2021-05-28 ENCOUNTER — LAB ENCOUNTER (OUTPATIENT)
Dept: LAB | Facility: HOSPITAL | Age: 71
End: 2021-05-28
Attending: INTERNAL MEDICINE
Payer: MEDICARE

## 2021-05-28 DIAGNOSIS — R73.03 PREDIABETES: ICD-10-CM

## 2021-05-28 DIAGNOSIS — R74.8 ELEVATED LIVER ENZYMES: ICD-10-CM

## 2021-05-28 DIAGNOSIS — E78.00 HIGH CHOLESTEROL: ICD-10-CM

## 2021-05-28 PROCEDURE — 80061 LIPID PANEL: CPT

## 2021-05-28 PROCEDURE — 36415 COLL VENOUS BLD VENIPUNCTURE: CPT

## 2021-05-28 PROCEDURE — 83036 HEMOGLOBIN GLYCOSYLATED A1C: CPT

## 2021-05-28 PROCEDURE — 80053 COMPREHEN METABOLIC PANEL: CPT

## 2021-05-28 NOTE — TELEPHONE ENCOUNTER
Spoke with Kathleen Pfeiffer from [facility] @ [phone] she states that the mask doesn't have nothing to do with the dry mouth. She will need a heating tubing. They already have the order but the patient need to call to place an order for supplies.  I call

## 2021-07-26 ENCOUNTER — OFFICE VISIT (OUTPATIENT)
Dept: INTERNAL MEDICINE CLINIC | Facility: CLINIC | Age: 71
End: 2021-07-26
Payer: MEDICARE

## 2021-07-26 ENCOUNTER — HOSPITAL ENCOUNTER (OUTPATIENT)
Dept: GENERAL RADIOLOGY | Facility: HOSPITAL | Age: 71
Discharge: HOME | End: 2021-07-26
Attending: NURSE PRACTITIONER
Payer: MEDICARE

## 2021-07-26 VITALS
HEIGHT: 62 IN | HEART RATE: 74 BPM | OXYGEN SATURATION: 98 % | BODY MASS INDEX: 37.47 KG/M2 | DIASTOLIC BLOOD PRESSURE: 70 MMHG | WEIGHT: 203.63 LBS | SYSTOLIC BLOOD PRESSURE: 113 MMHG

## 2021-07-26 DIAGNOSIS — M79.671 RIGHT FOOT PAIN: Primary | ICD-10-CM

## 2021-07-26 DIAGNOSIS — M79.671 RIGHT FOOT PAIN: ICD-10-CM

## 2021-07-26 PROCEDURE — 99213 OFFICE O/P EST LOW 20 MIN: CPT | Performed by: NURSE PRACTITIONER

## 2021-07-26 PROCEDURE — 73630 X-RAY EXAM OF FOOT: CPT | Performed by: NURSE PRACTITIONER

## 2021-07-26 NOTE — PATIENT INSTRUCTIONS
ASSESSMENT/PLAN:   Right foot pain  (primary encounter diagnosis)  Ordered xray  May continue Tylenol 500 mg every 4-6 hours as needed for pain no more than 3000 mg daily. No orders of the defined types were placed in this encounter.       Meds ajustado, ya que esto bloquearía el aporte de peri a la jennifer [place]. Si el vendaje se afloja, vuelva a enrollarlo. No use un vendaje elástico yung la noche. · Elevación.  Mantener la lesión elevada por encima del nivel del corazón ayuda a reducir l

## 2021-07-26 NOTE — PROGRESS NOTES
HPI:    Patient ID: Khloe Whittaker is a 78year old female. Exercise level: walking 2-3 times week but not recently d/t foot pain. Weight has been stable.     Wt Readings from Last 3 Encounters:  07/26/21 : 203 lb 9.6 oz (92.4 kg)  05/27/21 : 206 lb 3.2 stiffness and stiffness. Skin: Negative for itching. Current Outpatient Medications   Medication Sig Dispense Refill   • Amitriptyline HCl 100 MG Oral Tab Take 1 tablet (100 mg total) by mouth nightly.  90 tablet 1   • Clindamycin Phos-Benzoyl Per HISTORY:  Past Medical History:   Diagnosis Date   • Acid reflux disease    • Breast mass in female    • Difficulty sleeping    • Elevated liver enzymes 10/2017   • Fatty liver disease, nonalcoholic 04/7597   • Pneumonia due to organism 2016   • Crissy Ernandez Feet:    Feet:      Right foot:      Skin integrity: No erythema or warmth.       Left foot:      Skin integrity: Skin integrity normal.              ASSESSMENT/PLAN:   Right foot pain  (primary encounter diagnosis)  Ordered xray  May continue Tylenol 500 m

## 2021-07-27 ENCOUNTER — TELEPHONE (OUTPATIENT)
Dept: INTERNAL MEDICINE CLINIC | Facility: CLINIC | Age: 71
End: 2021-07-27

## 2021-07-27 NOTE — TELEPHONE ENCOUNTER
Pt was called and informed her of Kailee Newer message below and she verbalized understanding. Pt was also given the number to . Xray of right foot shows calcification, swelling, and heel spur.  Recommend following up with podiatrist. Refer

## 2021-08-10 RX ORDER — MONTELUKAST SODIUM 10 MG/1
10 TABLET ORAL NIGHTLY PRN
Qty: 90 TABLET | Refills: 1 | Status: SHIPPED | OUTPATIENT
Start: 2021-08-10 | End: 2022-01-31

## 2021-08-10 RX ORDER — PANTOPRAZOLE SODIUM 40 MG/1
40 TABLET, DELAYED RELEASE ORAL
Qty: 90 TABLET | Refills: 1 | Status: SHIPPED | OUTPATIENT
Start: 2021-08-10 | End: 2022-01-31

## 2021-08-10 NOTE — TELEPHONE ENCOUNTER
Refill passed per [facility], [facility] protocol.     Requested Prescriptions   Pending Prescriptions Disp Refills    PANTOPRAZOLE 40 MG Oral Tab EC [Pharmacy Med Name: Pantoprazole Sodium Ec 40 Mg Tab Krem] 90 tablet 0     Sig: Take 1 tablet (40 mg total) by kaylie

## 2021-10-17 PROBLEM — L29.9 ITCHY SCALP: Status: RESOLVED | Noted: 2020-09-29 | Resolved: 2021-10-17

## 2021-10-17 PROBLEM — J39.2 THROAT DRY: Status: RESOLVED | Noted: 2021-05-27 | Resolved: 2021-10-17

## 2021-10-18 ENCOUNTER — OFFICE VISIT (OUTPATIENT)
Dept: INTERNAL MEDICINE CLINIC | Facility: CLINIC | Age: 71
End: 2021-10-18
Payer: MEDICARE

## 2021-10-18 VITALS
DIASTOLIC BLOOD PRESSURE: 71 MMHG | TEMPERATURE: 99 F | HEART RATE: 72 BPM | HEIGHT: 62 IN | OXYGEN SATURATION: 98 % | BODY MASS INDEX: 38.24 KG/M2 | SYSTOLIC BLOOD PRESSURE: 120 MMHG | WEIGHT: 207.81 LBS | RESPIRATION RATE: 18 BRPM

## 2021-10-18 DIAGNOSIS — Z78.0 POSTMENOPAUSAL: ICD-10-CM

## 2021-10-18 DIAGNOSIS — R73.03 PREDIABETES: ICD-10-CM

## 2021-10-18 DIAGNOSIS — K76.0 FATTY LIVER DISEASE, NONALCOHOLIC: ICD-10-CM

## 2021-10-18 DIAGNOSIS — Z71.85 VACCINE COUNSELING: Primary | ICD-10-CM

## 2021-10-18 DIAGNOSIS — M19.019 SHOULDER ARTHRITIS: ICD-10-CM

## 2021-10-18 DIAGNOSIS — G47.33 OBSTRUCTIVE SLEEP APNEA SYNDROME: ICD-10-CM

## 2021-10-18 DIAGNOSIS — Z00.00 ENCOUNTER FOR ANNUAL HEALTH EXAMINATION: ICD-10-CM

## 2021-10-18 DIAGNOSIS — R31.29 OTHER MICROSCOPIC HEMATURIA: ICD-10-CM

## 2021-10-18 DIAGNOSIS — E78.00 HIGH CHOLESTEROL: ICD-10-CM

## 2021-10-18 DIAGNOSIS — Z12.31 BREAST CANCER SCREENING BY MAMMOGRAM: ICD-10-CM

## 2021-10-18 DIAGNOSIS — M54.12 CERVICAL RADICULOPATHY: ICD-10-CM

## 2021-10-18 DIAGNOSIS — R74.8 ELEVATED LIVER ENZYMES: ICD-10-CM

## 2021-10-18 PROCEDURE — 90662 IIV NO PRSV INCREASED AG IM: CPT | Performed by: INTERNAL MEDICINE

## 2021-10-18 PROCEDURE — G0439 PPPS, SUBSEQ VISIT: HCPCS | Performed by: INTERNAL MEDICINE

## 2021-10-18 PROCEDURE — G0008 ADMIN INFLUENZA VIRUS VAC: HCPCS | Performed by: INTERNAL MEDICINE

## 2021-10-18 PROCEDURE — 99497 ADVNCD CARE PLAN 30 MIN: CPT | Performed by: INTERNAL MEDICINE

## 2021-10-18 PROCEDURE — 81003 URINALYSIS AUTO W/O SCOPE: CPT | Performed by: INTERNAL MEDICINE

## 2021-10-18 RX ORDER — HYDROCORTISONE 25 MG/G
1 CREAM TOPICAL 2 TIMES DAILY
Qty: 28 G | Refills: 2 | Status: SHIPPED | OUTPATIENT
Start: 2021-10-18

## 2021-10-18 RX ORDER — BETAMETHASONE DIPROPIONATE 0.5 MG/ML
1 LOTION, AUGMENTED TOPICAL 2 TIMES DAILY
Qty: 60 ML | Refills: 3 | Status: SHIPPED | OUTPATIENT
Start: 2021-10-18 | End: 2021-11-18

## 2021-10-18 NOTE — PROGRESS NOTES
HPI:    Patient ID: Beverley Hess is a 73year old female.     Beverley Hess is a 73year old female who presents for a complete physical exam.   HPI:   Patient presents with:  Wellness Visit: medicare annual  Other: Question about CPAP machine     No LMP Take 1 tablet (10 mg total) by mouth nightly as needed. 90 tablet 1   • Amitriptyline HCl 100 MG Oral Tab Take 1 tablet (100 mg total) by mouth nightly.  90 tablet 1   • Clindamycin Phos-Benzoyl Perox 1-5 % External Gel Apply 1 Application topically nightly Social History:  Social History    Socioeconomic History      Marital status:     Occupational History      Occupation: Retired from [employer].      Tobacco Use      Smoking status: Former Smoker        Packs/day: 1.00        Years: 40.00 CO2 28.0 05/28/2021 06:35 AM    CREATSERUM 0.66 05/28/2021 06:35 AM    CA 8.6 05/28/2021 06:35 AM    AST 28 05/28/2021 06:35 AM    ALT 37 05/28/2021 06:35 AM    TSH 0.91 10/13/2017 11:25 AM        Lab Results   Component Value Date/Time    CHOLEST 182 05/2 Psychiatric/Behavioral: Positive for sleep disturbance. Negative for agitation, behavioral problems, confusion, decreased concentration, dysphoric mood, hallucinations, self-injury and suicidal ideas.  The patient is not nervous/anxious and is not hyperac Screening  Fish-Derived Produc*    OTHER (SEE COMMENTS)    Comment:Allergy Test Screening  Other                   OTHER (SEE COMMENTS)    Comment:Allergy Test Screening - Cockroach  Shrimp                  OTHER (SEE COMMENTS)    Comment:Allergy Test Scre distress. Appearance: Normal appearance. She is well-developed and well-groomed. She is not ill-appearing, toxic-appearing or diaphoretic. Interventions: She is not intubated. HENT:      Head: Normocephalic and atraumatic.       Right Ear: Tympani to light. Neck:      Thyroid: No thyroid mass, thyromegaly or thyroid tenderness. Vascular: Normal carotid pulses. No carotid bruit, hepatojugular reflux or JVD.       Trachea: Trachea and phonation normal. No tracheal tenderness, tracheostomy, abnor tenderness. Right lower leg: No edema. Left lower leg: No edema. Lymphadenopathy:      Head:      Right side of head: No submental, submandibular, preauricular, posterior auricular or occipital adenopathy.       Left side of head: No submental, cognitive assessment- see flowsheet entries    Functional Ability/Status   Ron Lee has a completely normal functional assessment! Please see flow sheet section for details.       Depression Screening (PHQ-2/PHQ-9): Over the LAST 2 WEEKS   Little int Prediabetes     Fatty liver disease, nonalcoholic     Acid reflux disease     Well woman exam with routine gynecological exam     Vaccine counseling     Shoulder arthritis     Cervical radiculopathy     Breast cancer screening by mammogram    Wt Readings f External Cream, Place 1 Application rectally 2 (two) times daily. May Rx any similar. ALREX 0.2 % Ophthalmic Suspension,   Ketoconazole 2 % External Shampoo, Apply 1 mL topically twice a week.   Flunisolide 25 MCG/ACT (0.025%) Nasal Solution, 2 sprays by E hearing conversations in a noisy background such as a crowded room or restaurant: No   I get confused about where sounds come from: No I misunderstand some words in a sentence and need to ask people to repeat themselves: No   I especially have trouble unde liver disease, nonalcoholic Weight loss. Elevated liver enzymes  -     COMP METABOLIC PANEL (14); Future    Cervical radiculopathy Slight worsening. Taking elavil and helps. DW pt/ of options. PT trial per pt.  .     Breast cancer screening by mammogram (H) 05/28/2021        Cardiovascular Disease Screening    Lipid Panel  Cholesterol  Lipoprotein (HDL)  Triglycerides Covered every 5 years for all Medicare beneficiaries without apparent signs or symptoms of cardiovascular disease Lab Results   Component V Each vaccine ([id number] & [id number]) covered once after 65 Prevnar 13: 10/13/2017    Tizxaddel55: 02/16/2018     No recommendations at this time    Hepatitis B One screening covered for patients with certain risk factors   -  No recommendations at this t

## 2021-10-18 NOTE — PATIENT INSTRUCTIONS
PLAN SUMMARY:   Diagnoses and all orders for this visit:    Vaccine counseling Flu shot today. Shoulder arthritis Stable. Prediabetes  -     HEMOGLOBIN A1C; Future  -     URINALYSIS, AUTO, W/O SCOPE    High cholesterol  -     LIPID PANEL;  Future  - (AAA) Covered once in a lifetime for one of the following risk factors   • Men who are 73-68 years old and have ever smoked   • Anyone with a family history -     Colorectal Cancer Screening  Covered for ages 52-80; only need ONE of the following:    Colon Medicare Part B; may be covered with your pharmacy  prescription benefits -  No recommendations at this time        Recommended Websites for Advanced Directives    [email or web address] [email or web address]_[email or web address]. pdf  An information packet, includin EL DOLOR DE ESPALDA: Es posible que deba permanecer en la cama los primeros días.  Jessenia tan pronto pueda, debe empezar a sentarse o caminar para evitar los problemas asociados con el descanso prolongado en la cama (debilidad muscular, empeoramiento de la ri necesita.]  Busque Prontamente Atención Médica  si algo de lo siguiente ocurre:  · El dolor Cordie Pinta o se extiende a los brazos o a las piernas  · Debilidad, entumecimiento o dolor en amanda o ambos brazos o piernas  · Pérdida del control del intestino o de la hacia abajo. Mantenga la posición yung 5 segundos y luego baje. Repita esto 5 veces. 8. Repita el ejercicio, esta vez levantando el brazo hacia un lado. Repita esto 5 veces.   9. Repita el ejercicio, esta vez levantando el [place] Meter atrás y con la pal fisioterapeuta. © 0896-6446 The [address] Todos los derechos reservados. Esta información no pretende sustituir la atención médica profesional. Sólo mosquera médico puede diagnosticar y tratar un problema de kellie.         Ejercicio de rotación de h kellie. Aproximación de hombros    Para empezar, siéntese en daniel silla, apoyando las plantas de los pies en el suelo. Desplace mosquera peso ligeramente hacia adelante para no arquear la espalda. Relájese.  Mantenga los oídos, los hombros y las caderas Arizmendi Hannah apoyados planos contra el piso. Las flechas muestran cómo respira profundamente. Para comenzar, acuéstese boca arriba con las rodillas flexionadas y las plantas de los pies apoyadas en el suelo.  [place] Shai dean, rasta reynolds y [place] atención médica profesional. Sólo mosquera médico puede diagnosticar y tratar un problema de kellie.         Ejercicios para el lawanda: Rotación del lawanda y del torso  Para comenzar, acuéstese boca arriba con las rodillas flexionadas y las plantas de los pies apo kellie.        Ejercicios para el lawanda: rotación del lawanda    Para comenzar, acuéstese boca arriba con las rodillas flexionadas y las plantas de los pies apoyadas en el suelo.  Mantenga [place] [employer], los hombros y las caderas, francisco no empuje la pa

## 2021-10-19 ENCOUNTER — LAB ENCOUNTER (OUTPATIENT)
Dept: LAB | Facility: HOSPITAL | Age: 71
End: 2021-10-19
Attending: INTERNAL MEDICINE
Payer: MEDICARE

## 2021-10-19 DIAGNOSIS — R74.8 ELEVATED LIVER ENZYMES: ICD-10-CM

## 2021-10-19 DIAGNOSIS — R73.03 PREDIABETES: ICD-10-CM

## 2021-10-19 DIAGNOSIS — E78.00 HIGH CHOLESTEROL: ICD-10-CM

## 2021-10-19 PROCEDURE — 80061 LIPID PANEL: CPT

## 2021-10-19 PROCEDURE — 36415 COLL VENOUS BLD VENIPUNCTURE: CPT

## 2021-10-19 PROCEDURE — 83036 HEMOGLOBIN GLYCOSYLATED A1C: CPT

## 2021-10-19 PROCEDURE — 80053 COMPREHEN METABOLIC PANEL: CPT

## 2021-11-17 ENCOUNTER — HOSPITAL ENCOUNTER (OUTPATIENT)
Dept: BONE DENSITY | Facility: HOSPITAL | Age: 71
Discharge: HOME | End: 2021-11-17
Attending: INTERNAL MEDICINE
Payer: MEDICARE

## 2021-11-17 DIAGNOSIS — Z78.0 POSTMENOPAUSAL: ICD-10-CM

## 2021-11-17 PROCEDURE — 77080 DXA BONE DENSITY AXIAL: CPT | Performed by: INTERNAL MEDICINE

## 2021-11-18 RX ORDER — AMITRIPTYLINE HYDROCHLORIDE 100 MG/1
100 TABLET, FILM COATED ORAL NIGHTLY
Qty: 90 TABLET | Refills: 1 | Status: SHIPPED | OUTPATIENT
Start: 2021-11-18

## 2021-11-18 RX ORDER — BETAMETHASONE DIPROPIONATE 0.5 MG/ML
1 LOTION, AUGMENTED TOPICAL 2 TIMES DAILY
Qty: 60 ML | Refills: 3 | Status: SHIPPED | OUTPATIENT
Start: 2021-11-18

## 2021-11-18 RX ORDER — CLOBETASOL PROPIONATE 0.5 MG/ML
1 LOTION TOPICAL 2 TIMES DAILY
Qty: 59 ML | Refills: 0 | OUTPATIENT
Start: 2021-11-18

## 2021-11-18 RX ORDER — CLOBETASOL PROPIONATE 0.5 MG/ML
1 LOTION TOPICAL 2 TIMES DAILY
Qty: 59 ML | Refills: 0 | Status: SHIPPED | OUTPATIENT
Start: 2021-11-18

## 2021-11-18 NOTE — TELEPHONE ENCOUNTER
Refill passed per [address] protocol.    Requested Prescriptions   Pending Prescriptions Disp Refills    AMITRIPTYLINE  MG Oral Tab [Pharmacy Med Name: Amitriptyline Hcl 100 Mg Tab Nort] 90 tablet 0     Sig: Take 1 tablet (100 mg total) by gabriel

## 2021-12-07 ENCOUNTER — APPOINTMENT (OUTPATIENT)
Dept: ULTRASOUND IMAGING | Facility: HOSPITAL | Age: 71
End: 2021-12-07
Attending: EMERGENCY MEDICINE
Payer: MEDICARE

## 2021-12-07 ENCOUNTER — HOSPITAL ENCOUNTER (EMERGENCY)
Facility: HOSPITAL | Age: 71
Discharge: HOME | End: 2021-12-07
Attending: EMERGENCY MEDICINE
Payer: MEDICARE

## 2021-12-07 ENCOUNTER — NURSE TRIAGE (OUTPATIENT)
Dept: INTERNAL MEDICINE CLINIC | Facility: CLINIC | Age: 71
End: 2021-12-07

## 2021-12-07 VITALS
TEMPERATURE: 98 F | WEIGHT: 206 LBS | OXYGEN SATURATION: 97 % | DIASTOLIC BLOOD PRESSURE: 77 MMHG | HEART RATE: 68 BPM | RESPIRATION RATE: 18 BRPM | SYSTOLIC BLOOD PRESSURE: 144 MMHG | HEIGHT: 63 IN | BODY MASS INDEX: 36.5 KG/M2

## 2021-12-07 DIAGNOSIS — M79.604 RIGHT LEG PAIN: Primary | ICD-10-CM

## 2021-12-07 DIAGNOSIS — M54.41 ACUTE RIGHT-SIDED LOW BACK PAIN WITH RIGHT-SIDED SCIATICA: ICD-10-CM

## 2021-12-07 PROCEDURE — 93971 EXTREMITY STUDY: CPT | Performed by: EMERGENCY MEDICINE

## 2021-12-07 PROCEDURE — 96372 THER/PROPH/DIAG INJ SC/IM: CPT

## 2021-12-07 PROCEDURE — 99284 EMERGENCY DEPT VISIT MOD MDM: CPT

## 2021-12-07 RX ORDER — KETOROLAC TROMETHAMINE 30 MG/ML
30 INJECTION, SOLUTION INTRAMUSCULAR; INTRAVENOUS ONCE
Status: COMPLETED | OUTPATIENT
Start: 2021-12-07 | End: 2021-12-07

## 2021-12-07 NOTE — ED PROVIDER NOTES
Patient Seen in: [facility] AND [facility] Emergency Department      History   Patient presents with:  Pain    Stated Complaint: R leg Pain    Subjective:   HPI    42-year-old female presents for evaluation of right lower extremity pain.   Her pain started severa above.    Physical Exam     ED Triage Vitals [12/07/21 1432]   /77   Pulse 68   Resp 18   Temp 98.2 °F (36.8 °C)   Temp src Temporal   SpO2 97 %   O2 Device None (Room air)       Current:/77   Pulse 68   Temp 98.2 °F (36.8 °C) (Temporal)   Resp Reflexes normal.      Comments: No focal deficits         Differential includes DVT, Baker's cyst, lumbar radicular pain    ED Course   Labs Reviewed - No data to display       US VENOUS DOPPLER LEG RIGHT - DIAG IMG ([id number])    Result Date: 12/7/2021  C

## 2021-12-07 NOTE — TELEPHONE ENCOUNTER
Action Requested: Summary for Provider     []  Critical Lab, Recommendations Needed  [] Need Additional Advice  [x]   FYI    []   Need Orders  [] Need Medications Sent to Pharmacy  []  Other     SUMMARY: pt will go to [address] [place] [address] ER. Pt's son, Dorys Calvo will take h

## 2021-12-07 NOTE — ED INITIAL ASSESSMENT (HPI)
Patient sent in by PMD for rule out DVT. Patient having pain to R calf beginning about 4-5 days ago. Patient denies any trauma or injury.

## 2021-12-09 NOTE — TELEPHONE ENCOUNTER
Disposition and Plan      Clinical Impression:  Right leg pain  (primary encounter diagnosis)  Acute right-sided low back pain with right-sided sciatica      Disposition:  Discharge  12/7/2021  4:14 pm     Follow-up:  Arabella Beltrán MD  [address]

## 2021-12-09 NOTE — TELEPHONE ENCOUNTER
Can we check if patient went to the ER. I do not see any notes or any information regarding any ER visit.

## 2021-12-24 ENCOUNTER — LAB ENCOUNTER (OUTPATIENT)
Dept: LAB | Facility: HOSPITAL | Age: 71
End: 2021-12-24
Attending: OBSTETRICS & GYNECOLOGY
Payer: MEDICARE

## 2021-12-24 ENCOUNTER — TELEPHONE (OUTPATIENT)
Dept: OBGYN CLINIC | Facility: CLINIC | Age: 71
End: 2021-12-24

## 2021-12-24 DIAGNOSIS — R30.0 DYSURIA: ICD-10-CM

## 2021-12-24 DIAGNOSIS — R30.0 DYSURIA: Primary | ICD-10-CM

## 2021-12-24 PROCEDURE — 81001 URINALYSIS AUTO W/SCOPE: CPT

## 2021-12-24 PROCEDURE — 87086 URINE CULTURE/COLONY COUNT: CPT

## 2021-12-24 NOTE — TELEPHONE ENCOUNTER
Patient name and  verified. Patient complaining of burning when urinating for 3 days. States hematuria began today. Order for UA and urine culture ordered for patient to complete.

## 2021-12-27 ENCOUNTER — OFFICE VISIT (OUTPATIENT)
Dept: INTERNAL MEDICINE CLINIC | Facility: CLINIC | Age: 71
End: 2021-12-27
Payer: MEDICARE

## 2021-12-27 VITALS
HEART RATE: 92 BPM | BODY MASS INDEX: 36.68 KG/M2 | SYSTOLIC BLOOD PRESSURE: 125 MMHG | WEIGHT: 207 LBS | DIASTOLIC BLOOD PRESSURE: 65 MMHG | TEMPERATURE: 99 F | HEIGHT: 63 IN

## 2021-12-27 DIAGNOSIS — R31.9 HEMATURIA, UNSPECIFIED TYPE: ICD-10-CM

## 2021-12-27 DIAGNOSIS — N30.01 ACUTE CYSTITIS WITH HEMATURIA: Primary | ICD-10-CM

## 2021-12-27 PROCEDURE — 99213 OFFICE O/P EST LOW 20 MIN: CPT | Performed by: INTERNAL MEDICINE

## 2021-12-27 PROCEDURE — 81003 URINALYSIS AUTO W/O SCOPE: CPT | Performed by: INTERNAL MEDICINE

## 2021-12-27 RX ORDER — NITROFURANTOIN 25; 75 MG/1; MG/1
100 CAPSULE ORAL 2 TIMES DAILY
Qty: 14 CAPSULE | Refills: 0 | Status: SHIPPED | OUTPATIENT
Start: 2021-12-27

## 2021-12-27 RX ORDER — CIPROFLOXACIN 500 MG/1
500 TABLET, FILM COATED ORAL 2 TIMES DAILY
Qty: 14 TABLET | Refills: 0 | Status: SHIPPED | OUTPATIENT
Start: 2021-12-27 | End: 2021-12-27

## 2021-12-27 NOTE — TELEPHONE ENCOUNTER
Patient name and  verified. Patient informed of AJB result note and recommendations. Informed patient she should contact PCP as AJB is not in office this week. No further questions.

## 2021-12-27 NOTE — TELEPHONE ENCOUNTER
Nicaraguan  Pt complaining she is in a lot of pain. She is asking what her urine test results are. She can see blood in her urine.     Please advise

## 2021-12-27 NOTE — PROGRESS NOTES
Subjective:     Patient ID: Monalisa Wilcox is a 73year old female. HPI  She came in today complaining of burning during urination and blood in the urine.   According to her symptoms are ongoing for 1 week she did urine culture on the 24th came back sloane MG Oral Tab Take 1 tablet (7.5 mg total) by mouth daily. 30 tablet 1   • clotrimazole-betamethasone (LOTRISONE) 1-0.05 % External Cream Apply 1 Application topically 2 (two) times daily.  45 g 0   • ALREX 0.2 % Ophthalmic Suspension      • Ketoconazole 2 % Smokeless tobacco: Never Used    Alcohol use: No    Drug use: No       Objective:   Physical Exam  Vitals and nursing note reviewed. Constitutional:       Appearance: Normal appearance. HENT:      Head: Normocephalic and atraumatic.    Cardiovascular:

## 2021-12-28 ENCOUNTER — TELEPHONE (OUTPATIENT)
Dept: INTERNAL MEDICINE CLINIC | Facility: CLINIC | Age: 71
End: 2021-12-28

## 2021-12-28 NOTE — TELEPHONE ENCOUNTER
Deepa Rivera      Female, 73year old  9/27/1950, [id number]  Phone:   [phone] (M)    Last Weight:   207 lb  Allergies:   Please  Assess why needs antibiotic      Response to cancel request received from pharmacy.   Received: Yesterday  In

## 2021-12-30 ENCOUNTER — HOSPITAL ENCOUNTER (OUTPATIENT)
Dept: GENERAL RADIOLOGY | Facility: HOSPITAL | Age: 71
Discharge: HOME | End: 2021-12-30
Attending: INTERNAL MEDICINE
Payer: MEDICARE

## 2021-12-30 DIAGNOSIS — N20.0 KIDNEY STONE: ICD-10-CM

## 2021-12-30 PROCEDURE — 74018 RADEX ABDOMEN 1 VIEW: CPT | Performed by: INTERNAL MEDICINE

## 2022-01-06 ENCOUNTER — LAB ENCOUNTER (OUTPATIENT)
Dept: LAB | Facility: HOSPITAL | Age: 72
End: 2022-01-06
Attending: INTERNAL MEDICINE
Payer: MEDICARE

## 2022-01-06 DIAGNOSIS — R31.29 OTHER MICROSCOPIC HEMATURIA: ICD-10-CM

## 2022-01-06 LAB
BILIRUB UR QL: NEGATIVE
CLARITY UR: CLEAR
COLOR UR: COLORLESS
GLUCOSE UR-MCNC: NEGATIVE MG/DL
KETONES UR-MCNC: NEGATIVE MG/DL
LEUKOCYTE ESTERASE UR QL STRIP.AUTO: NEGATIVE
NITRITE UR QL STRIP.AUTO: NEGATIVE
PH UR: 7 [PH] (ref 5–8)
PROT UR-MCNC: NEGATIVE MG/DL
SP GR UR STRIP: 1 (ref 1–1.03)
UROBILINOGEN UR STRIP-ACNC: <2

## 2022-01-06 PROCEDURE — 81001 URINALYSIS AUTO W/SCOPE: CPT

## 2022-01-20 ENCOUNTER — TELEPHONE (OUTPATIENT)
Dept: PULMONOLOGY | Facility: CLINIC | Age: 72
End: 2022-01-20

## 2022-01-20 PROBLEM — J41.0 SMOKERS' COUGH (HCC): Chronic | Status: ACTIVE | Noted: 2022-01-20

## 2022-01-31 RX ORDER — MONTELUKAST SODIUM 10 MG/1
10 TABLET ORAL NIGHTLY PRN
Qty: 90 TABLET | Refills: 1 | Status: SHIPPED | OUTPATIENT
Start: 2022-01-31 | End: 2022-08-11

## 2022-01-31 RX ORDER — PANTOPRAZOLE SODIUM 40 MG/1
40 TABLET, DELAYED RELEASE ORAL
Qty: 90 TABLET | Refills: 0 | Status: SHIPPED | OUTPATIENT
Start: 2022-01-31 | End: 2022-08-11

## 2022-01-31 NOTE — TELEPHONE ENCOUNTER
Refill passed per [address] protocol. Requested Prescriptions   Pending Prescriptions Disp Refills    PANTOPRAZOLE 40 MG Oral Tab EC [Pharmacy Med Name: Pantoprazole Sodium Ec 40 Mg Tab Auro] 90 tablet 0     Sig: take 1 tablet by mouth every morning before breakfast        Gastrointestional Medication Protocol Passed - 1/30/2022  1:32 AM        Passed - Appointment in past 12 or next 3 months           MONTELUKAST 10 MG Oral Tab [Pharmacy Med Name: Montelukast Sodium 10 Mg Tab Auro] 90 tablet 0     Sig: Take 1 tablet (10 mg total) by mouth nightly as needed.         Asthma & COPD Medication Protocol Passed - 1/30/2022  1:32 AM        Passed - Appointment in past 6 or next 3 months              [unfilled]      @[email or web address]@

## 2022-02-28 ENCOUNTER — TELEPHONE (OUTPATIENT)
Dept: OBGYN CLINIC | Facility: CLINIC | Age: 72
End: 2022-02-28

## 2022-02-28 NOTE — TELEPHONE ENCOUNTER
Order for screening mammogram placed. Patient informed and central scheduling number given to patient.

## 2022-03-09 ENCOUNTER — TELEPHONE (OUTPATIENT)
Dept: INTERNAL MEDICINE CLINIC | Facility: CLINIC | Age: 72
End: 2022-03-09

## 2022-03-09 NOTE — TELEPHONE ENCOUNTER
We do not simply order x-rays without seeing the patient. We need to order the correct x-rays there is different types of x-rays.   She needs to come in and get evaluated first.

## 2022-03-10 ENCOUNTER — HOSPITAL ENCOUNTER (OUTPATIENT)
Dept: GENERAL RADIOLOGY | Facility: HOSPITAL | Age: 72
Discharge: HOME | End: 2022-03-10
Attending: INTERNAL MEDICINE
Payer: MEDICARE

## 2022-03-10 ENCOUNTER — OFFICE VISIT (OUTPATIENT)
Dept: INTERNAL MEDICINE CLINIC | Facility: CLINIC | Age: 72
End: 2022-03-10
Payer: COMMERCIAL

## 2022-03-10 VITALS
RESPIRATION RATE: 18 BRPM | WEIGHT: 212 LBS | BODY MASS INDEX: 37.56 KG/M2 | OXYGEN SATURATION: 98 % | HEART RATE: 76 BPM | DIASTOLIC BLOOD PRESSURE: 82 MMHG | TEMPERATURE: 98 F | SYSTOLIC BLOOD PRESSURE: 136 MMHG | HEIGHT: 63 IN

## 2022-03-10 DIAGNOSIS — R21 RASH: ICD-10-CM

## 2022-03-10 DIAGNOSIS — M79.675 PAIN OF TOE OF LEFT FOOT: ICD-10-CM

## 2022-03-10 DIAGNOSIS — M79.675 PAIN OF TOE OF LEFT FOOT: Primary | ICD-10-CM

## 2022-03-10 PROBLEM — S92.515D NONDISPLACED FRACTURE OF PROXIMAL PHALANX OF LESSER TOE OF LEFT FOOT WITH ROUTINE HEALING: Status: ACTIVE | Noted: 2022-03-10

## 2022-03-10 PROCEDURE — 73660 X-RAY EXAM OF TOE(S): CPT | Performed by: INTERNAL MEDICINE

## 2022-03-10 PROCEDURE — 99214 OFFICE O/P EST MOD 30 MIN: CPT | Performed by: INTERNAL MEDICINE

## 2022-03-10 PROCEDURE — 3079F DIAST BP 80-89 MM HG: CPT | Performed by: INTERNAL MEDICINE

## 2022-03-10 PROCEDURE — 3075F SYST BP GE 130 - 139MM HG: CPT | Performed by: INTERNAL MEDICINE

## 2022-03-10 PROCEDURE — 3008F BODY MASS INDEX DOCD: CPT | Performed by: INTERNAL MEDICINE

## 2022-03-10 RX ORDER — CLOBETASOL PROPIONATE 0.5 MG/G
1 CREAM TOPICAL 2 TIMES DAILY
Qty: 45 G | Refills: 0 | Status: SHIPPED | OUTPATIENT
Start: 2022-03-10

## 2022-03-10 RX ORDER — CLOBETASOL PROPIONATE 0.5 MG/ML
1 LOTION TOPICAL 2 TIMES DAILY
Qty: 59 ML | Refills: 0 | Status: CANCELLED | OUTPATIENT
Start: 2022-03-10

## 2022-03-28 ENCOUNTER — TELEPHONE (OUTPATIENT)
Dept: PULMONOLOGY | Facility: CLINIC | Age: 72
End: 2022-03-28

## 2022-03-28 NOTE — TELEPHONE ENCOUNTER
Patient is overdue for CT lung low dose screening as ordered by Dr. Timbo Dumont. First letter sent to patient.

## 2022-04-14 RX ORDER — AMITRIPTYLINE HYDROCHLORIDE 100 MG/1
100 TABLET ORAL NIGHTLY
Qty: 90 TABLET | Refills: 0 | Status: SHIPPED | OUTPATIENT
Start: 2022-04-14 | End: 2022-06-17

## 2022-04-14 NOTE — TELEPHONE ENCOUNTER
Please review protocol failed/ No protocol    Requested Prescriptions   Pending Prescriptions Disp Refills    AMITRIPTYLINE  MG Oral Tab [Pharmacy Med Name: Amitriptyline Hcl 100 Mg Tab Nort] 90 tablet 0     Sig: Take 1 tablet (100 mg total) by mouth nightly.         Psychiatric Non-Scheduled (Anti-Anxiety) Passed - 4/14/2022 10:28 AM        Passed - Appointment in last 6 or next 3 months              Recent Outpatient Visits              1 month ago Pain of toe of left foot    [place], [place], Morgan Sheriff MD    Office Visit    3 months ago Acute cystitis with hematuria    Lucien Chambers MD    Office Visit    5 months ago Vaccine counseling    [facility], Crystal Bernard MD    Office Visit    8 months ago Right foot pain    [place], [place], Mohan Michaels APRN    Office Visit    10 months ago Elevated liver enzymes    [place], [place], Crystal Bernard MD    Office Visit            Future Appointments         Provider Department Appt Notes    In 5 days Ella Springer MD [place], [place], Terrie 3 month fu - [address] AFTER 10/19/2022, [facility]

## 2022-04-19 ENCOUNTER — OFFICE VISIT (OUTPATIENT)
Dept: INTERNAL MEDICINE CLINIC | Facility: CLINIC | Age: 72
End: 2022-04-19
Payer: COMMERCIAL

## 2022-04-19 VITALS
RESPIRATION RATE: 16 BRPM | DIASTOLIC BLOOD PRESSURE: 62 MMHG | BODY MASS INDEX: 37.88 KG/M2 | HEIGHT: 63 IN | HEART RATE: 83 BPM | TEMPERATURE: 99 F | WEIGHT: 213.81 LBS | SYSTOLIC BLOOD PRESSURE: 120 MMHG | OXYGEN SATURATION: 97 %

## 2022-04-19 DIAGNOSIS — Z12.31 BREAST CANCER SCREENING BY MAMMOGRAM: ICD-10-CM

## 2022-04-19 DIAGNOSIS — R73.03 PREDIABETES: ICD-10-CM

## 2022-04-19 DIAGNOSIS — J06.9 VIRAL UPPER RESPIRATORY TRACT INFECTION: ICD-10-CM

## 2022-04-19 DIAGNOSIS — Z71.85 VACCINE COUNSELING: Primary | ICD-10-CM

## 2022-04-19 DIAGNOSIS — E78.00 HIGH CHOLESTEROL: ICD-10-CM

## 2022-04-19 DIAGNOSIS — G47.33 OBSTRUCTIVE SLEEP APNEA SYNDROME: ICD-10-CM

## 2022-04-19 PROCEDURE — 3078F DIAST BP <80 MM HG: CPT | Performed by: INTERNAL MEDICINE

## 2022-04-19 PROCEDURE — 99215 OFFICE O/P EST HI 40 MIN: CPT | Performed by: INTERNAL MEDICINE

## 2022-04-19 PROCEDURE — 3074F SYST BP LT 130 MM HG: CPT | Performed by: INTERNAL MEDICINE

## 2022-04-19 PROCEDURE — 3008F BODY MASS INDEX DOCD: CPT | Performed by: INTERNAL MEDICINE

## 2022-04-19 RX ORDER — LEVOCETIRIZINE DIHYDROCHLORIDE 5 MG/1
5 TABLET, FILM COATED ORAL EVERY EVENING
Qty: 10 TABLET | Refills: 0 | Status: SHIPPED | OUTPATIENT
Start: 2022-04-19

## 2022-04-20 ENCOUNTER — LAB ENCOUNTER (OUTPATIENT)
Dept: LAB | Facility: HOSPITAL | Age: 72
End: 2022-04-20
Attending: INTERNAL MEDICINE
Payer: MEDICARE

## 2022-04-20 DIAGNOSIS — E78.00 HIGH CHOLESTEROL: ICD-10-CM

## 2022-04-20 LAB
ALBUMIN SERPL-MCNC: 3.7 G/DL (ref 3.4–5)
ALBUMIN/GLOB SERPL: 1.1 {RATIO} (ref 1–2)
ALP LIVER SERPL-CCNC: 114 U/L
ALT SERPL-CCNC: 57 U/L
ANION GAP SERPL CALC-SCNC: 6 MMOL/L (ref 0–18)
AST SERPL-CCNC: 38 U/L (ref 15–37)
BILIRUB SERPL-MCNC: 0.4 MG/DL (ref 0.1–2)
BUN BLD-MCNC: 10 MG/DL (ref 7–18)
BUN/CREAT SERPL: 15.6 (ref 10–20)
CALCIUM BLD-MCNC: 9.1 MG/DL (ref 8.5–10.1)
CHLORIDE SERPL-SCNC: 106 MMOL/L (ref 98–112)
CHOLEST SERPL-MCNC: 179 MG/DL (ref ?–200)
CO2 SERPL-SCNC: 28 MMOL/L (ref 21–32)
CREAT BLD-MCNC: 0.64 MG/DL
FASTING PATIENT LIPID ANSWER: YES
FASTING STATUS PATIENT QL REPORTED: YES
GLOBULIN PLAS-MCNC: 3.5 G/DL (ref 2.8–4.4)
GLUCOSE BLD-MCNC: 103 MG/DL (ref 70–99)
HDLC SERPL-MCNC: 54 MG/DL (ref 40–59)
LDLC SERPL CALC-MCNC: 99 MG/DL (ref ?–100)
NONHDLC SERPL-MCNC: 125 MG/DL (ref ?–130)
OSMOLALITY SERPL CALC.SUM OF ELEC: 289 MOSM/KG (ref 275–295)
POTASSIUM SERPL-SCNC: 4.2 MMOL/L (ref 3.5–5.1)
PROT SERPL-MCNC: 7.2 G/DL (ref 6.4–8.2)
SODIUM SERPL-SCNC: 140 MMOL/L (ref 136–145)
TRIGL SERPL-MCNC: 152 MG/DL (ref 30–149)
VLDLC SERPL CALC-MCNC: 25 MG/DL (ref 0–30)

## 2022-04-20 PROCEDURE — 36415 COLL VENOUS BLD VENIPUNCTURE: CPT

## 2022-04-20 PROCEDURE — 80053 COMPREHEN METABOLIC PANEL: CPT

## 2022-04-20 PROCEDURE — 80061 LIPID PANEL: CPT

## 2022-04-21 NOTE — PROGRESS NOTES
CMP Normal (electrolytes, kidney and liver functions), but liver enzymes are elevated slightly. Previous ultrasound showed fatty liver. Which are fat deposits in the liver which can cause liver inflammation. With weight loss and low-carb diet that will help. Recheck in 3 to 6 months. Lipid (choilesterol) is better. Goal LDL less than 100. Goal triglycerides should be less than 152. Triglycerides get elevated with higher carbs. Lower carb diet. Recheck in 6 months.

## 2022-06-17 RX ORDER — AMITRIPTYLINE HYDROCHLORIDE 100 MG/1
100 TABLET ORAL NIGHTLY
Qty: 90 TABLET | Refills: 0 | Status: SHIPPED | OUTPATIENT
Start: 2022-06-17 | End: 2022-10-03

## 2022-06-20 ENCOUNTER — TELEPHONE (OUTPATIENT)
Dept: INTERNAL MEDICINE CLINIC | Facility: CLINIC | Age: 72
End: 2022-06-20

## 2022-06-20 NOTE — TELEPHONE ENCOUNTER
I spoke to patient letting her know per Dr Hassan Seip she doesn't qualify for her. Per patient she has the parking placard for years. This is for renewal. Her previous Doctor that is retired used to fill it ou because of the arthritis.

## 2022-07-05 NOTE — TELEPHONE ENCOUNTER
The parking placard's are meant for people who have difficulty with ambulation and therefore cannot get to the car due to long distances and if you look at the guidelines it also if someone has a cane or a walker she does not really qualify based upon that she does not have any issues with breathing.   So there is an issue

## 2022-07-05 NOTE — TELEPHONE ENCOUNTER
Patient has been informed of Dr Shaver See message luanalos. Patient will come at [facility] office to  placard form.

## 2022-07-29 ENCOUNTER — OFFICE VISIT (OUTPATIENT)
Dept: INTERNAL MEDICINE CLINIC | Facility: CLINIC | Age: 72
End: 2022-07-29
Payer: COMMERCIAL

## 2022-07-29 ENCOUNTER — HOSPITAL ENCOUNTER (OUTPATIENT)
Dept: GENERAL RADIOLOGY | Age: 72
Discharge: HOME | End: 2022-07-29
Attending: INTERNAL MEDICINE
Payer: MEDICARE

## 2022-07-29 VITALS
HEIGHT: 63 IN | DIASTOLIC BLOOD PRESSURE: 59 MMHG | BODY MASS INDEX: 36.86 KG/M2 | TEMPERATURE: 98 F | WEIGHT: 208 LBS | HEART RATE: 73 BPM | SYSTOLIC BLOOD PRESSURE: 131 MMHG | OXYGEN SATURATION: 96 %

## 2022-07-29 DIAGNOSIS — M54.40 CHRONIC LOW BACK PAIN WITH SCIATICA, SCIATICA LATERALITY UNSPECIFIED, UNSPECIFIED BACK PAIN LATERALITY: ICD-10-CM

## 2022-07-29 DIAGNOSIS — G89.29 CHRONIC LOW BACK PAIN WITH SCIATICA, SCIATICA LATERALITY UNSPECIFIED, UNSPECIFIED BACK PAIN LATERALITY: ICD-10-CM

## 2022-07-29 DIAGNOSIS — M54.40 CHRONIC LOW BACK PAIN WITH SCIATICA, SCIATICA LATERALITY UNSPECIFIED, UNSPECIFIED BACK PAIN LATERALITY: Primary | ICD-10-CM

## 2022-07-29 DIAGNOSIS — G89.29 CHRONIC LOW BACK PAIN WITH SCIATICA, SCIATICA LATERALITY UNSPECIFIED, UNSPECIFIED BACK PAIN LATERALITY: Primary | ICD-10-CM

## 2022-07-29 PROCEDURE — 1125F AMNT PAIN NOTED PAIN PRSNT: CPT | Performed by: INTERNAL MEDICINE

## 2022-07-29 PROCEDURE — 3078F DIAST BP <80 MM HG: CPT | Performed by: INTERNAL MEDICINE

## 2022-07-29 PROCEDURE — 72110 X-RAY EXAM L-2 SPINE 4/>VWS: CPT | Performed by: INTERNAL MEDICINE

## 2022-07-29 PROCEDURE — 3008F BODY MASS INDEX DOCD: CPT | Performed by: INTERNAL MEDICINE

## 2022-07-29 PROCEDURE — 3075F SYST BP GE 130 - 139MM HG: CPT | Performed by: INTERNAL MEDICINE

## 2022-07-29 PROCEDURE — 99213 OFFICE O/P EST LOW 20 MIN: CPT | Performed by: INTERNAL MEDICINE

## 2022-07-29 RX ORDER — METHYLPREDNISOLONE 4 MG/1
TABLET ORAL
Qty: 1 EACH | Refills: 0 | Status: SHIPPED | OUTPATIENT
Start: 2022-07-29

## 2022-07-29 RX ORDER — CYCLOBENZAPRINE HCL 5 MG
5 TABLET ORAL NIGHTLY PRN
Qty: 20 TABLET | Refills: 0 | Status: SHIPPED | OUTPATIENT
Start: 2022-07-29

## 2022-08-11 RX ORDER — PANTOPRAZOLE SODIUM 40 MG/1
40 TABLET, DELAYED RELEASE ORAL
Qty: 90 TABLET | Refills: 0 | Status: SHIPPED | OUTPATIENT
Start: 2022-08-11 | End: 2022-12-19

## 2022-08-11 RX ORDER — MONTELUKAST SODIUM 10 MG/1
10 TABLET ORAL NIGHTLY PRN
Qty: 90 TABLET | Refills: 0 | Status: SHIPPED | OUTPATIENT
Start: 2022-08-11 | End: 2022-11-09

## 2022-09-02 ENCOUNTER — OFFICE VISIT (OUTPATIENT)
Dept: INTERNAL MEDICINE CLINIC | Facility: CLINIC | Age: 72
End: 2022-09-02
Payer: MEDICARE

## 2022-09-02 VITALS
HEIGHT: 63 IN | RESPIRATION RATE: 16 BRPM | TEMPERATURE: 98 F | WEIGHT: 209.38 LBS | SYSTOLIC BLOOD PRESSURE: 135 MMHG | BODY MASS INDEX: 37.1 KG/M2 | OXYGEN SATURATION: 98 % | HEART RATE: 74 BPM | DIASTOLIC BLOOD PRESSURE: 76 MMHG

## 2022-09-02 DIAGNOSIS — M47.26 OSTEOARTHRITIS OF SPINE WITH RADICULOPATHY, LUMBAR REGION: Primary | ICD-10-CM

## 2022-09-02 PROCEDURE — 99213 OFFICE O/P EST LOW 20 MIN: CPT | Performed by: INTERNAL MEDICINE

## 2022-09-02 RX ORDER — METHYLPREDNISOLONE 4 MG/1
TABLET ORAL
Qty: 1 EACH | Refills: 0 | Status: SHIPPED | OUTPATIENT
Start: 2022-09-02

## 2022-09-13 ENCOUNTER — TELEPHONE (OUTPATIENT)
Dept: INTERNAL MEDICINE CLINIC | Facility: CLINIC | Age: 72
End: 2022-09-13

## 2022-09-13 PROBLEM — M47.816 SPONDYLOSIS OF LUMBAR REGION WITHOUT MYELOPATHY OR RADICULOPATHY: Status: ACTIVE | Noted: 2022-09-13

## 2022-09-13 NOTE — TELEPHONE ENCOUNTER
Pt has been informed that placard form is ready for pick-up, pt will pick-up tomorrow at [facility].

## 2022-09-15 ENCOUNTER — TELEPHONE (OUTPATIENT)
Dept: INTERNAL MEDICINE CLINIC | Facility: CLINIC | Age: 72
End: 2022-09-15

## 2022-09-15 NOTE — TELEPHONE ENCOUNTER
Patient came in to the [facility] office to bring a new handicap form. The facility did not received it because it did not have the duration of months.  She also states she gets permanent disability not temporary she would like a new form if possible and  at the [place] office when ready

## 2022-09-29 ENCOUNTER — OFFICE VISIT (OUTPATIENT)
Dept: PHYSICAL MEDICINE AND REHAB | Facility: CLINIC | Age: 72
End: 2022-09-29
Payer: MEDICARE

## 2022-09-29 ENCOUNTER — MED REC SCAN ONLY (OUTPATIENT)
Dept: PHYSICAL MEDICINE AND REHAB | Facility: CLINIC | Age: 72
End: 2022-09-29

## 2022-09-29 ENCOUNTER — LAB ENCOUNTER (OUTPATIENT)
Dept: LAB | Facility: HOSPITAL | Age: 72
End: 2022-09-29
Attending: INTERNAL MEDICINE
Payer: MEDICARE

## 2022-09-29 VITALS
BODY MASS INDEX: 37.21 KG/M2 | HEART RATE: 79 BPM | HEIGHT: 63 IN | WEIGHT: 210 LBS | SYSTOLIC BLOOD PRESSURE: 130 MMHG | OXYGEN SATURATION: 95 % | DIASTOLIC BLOOD PRESSURE: 76 MMHG

## 2022-09-29 DIAGNOSIS — M25.50 POLYARTHRALGIA: ICD-10-CM

## 2022-09-29 DIAGNOSIS — E66.3 PATIENT OVERWEIGHT: ICD-10-CM

## 2022-09-29 DIAGNOSIS — M79.10 MYALGIA, UNSPECIFIED SITE: ICD-10-CM

## 2022-09-29 DIAGNOSIS — E55.9 VITAMIN D DEFICIENCY, UNSPECIFIED: ICD-10-CM

## 2022-09-29 DIAGNOSIS — K21.9 GASTROESOPHAGEAL REFLUX DISEASE WITHOUT ESOPHAGITIS: ICD-10-CM

## 2022-09-29 DIAGNOSIS — G47.00 INSOMNIA, UNSPECIFIED TYPE: ICD-10-CM

## 2022-09-29 DIAGNOSIS — E66.8 OTHER OBESITY: ICD-10-CM

## 2022-09-29 DIAGNOSIS — R73.03 PREDIABETES: ICD-10-CM

## 2022-09-29 DIAGNOSIS — G47.33 OBSTRUCTIVE SLEEP APNEA SYNDROME: Primary | ICD-10-CM

## 2022-09-29 PROBLEM — E66.01 MORBID (SEVERE) OBESITY DUE TO EXCESS CALORIES (HCC): Status: ACTIVE | Noted: 2022-09-29

## 2022-09-29 LAB
BASOPHILS # BLD AUTO: 0.08 X10(3) UL (ref 0–0.2)
BASOPHILS NFR BLD AUTO: 1 %
DEPRECATED RDW RBC AUTO: 42.3 FL (ref 35.1–46.3)
EOSINOPHIL # BLD AUTO: 0.25 X10(3) UL (ref 0–0.7)
EOSINOPHIL NFR BLD AUTO: 3 %
ERYTHROCYTE [DISTWIDTH] IN BLOOD BY AUTOMATED COUNT: 13.7 % (ref 11–15)
ERYTHROCYTE [SEDIMENTATION RATE] IN BLOOD: 29 MM/HR
HCT VFR BLD AUTO: 44.7 %
HGB BLD-MCNC: 14.4 G/DL
IMM GRANULOCYTES # BLD AUTO: 0.02 X10(3) UL (ref 0–1)
IMM GRANULOCYTES NFR BLD: 0.2 %
LYMPHOCYTES # BLD AUTO: 3.25 X10(3) UL (ref 1–4)
LYMPHOCYTES NFR BLD AUTO: 39.2 %
MCH RBC QN AUTO: 27.2 PG (ref 26–34)
MCHC RBC AUTO-ENTMCNC: 32.2 G/DL (ref 31–37)
MCV RBC AUTO: 84.5 FL
MONOCYTES # BLD AUTO: 0.79 X10(3) UL (ref 0.1–1)
MONOCYTES NFR BLD AUTO: 9.5 %
NEUTROPHILS # BLD AUTO: 3.9 X10 (3) UL (ref 1.5–7.7)
NEUTROPHILS # BLD AUTO: 3.9 X10(3) UL (ref 1.5–7.7)
NEUTROPHILS NFR BLD AUTO: 47.1 %
PLATELET # BLD AUTO: 304 10(3)UL (ref 150–450)
RBC # BLD AUTO: 5.29 X10(6)UL
RHEUMATOID FACT SERPL-ACNC: <10 IU/ML (ref ?–15)
TSI SER-ACNC: 0.73 MIU/ML (ref 0.36–3.74)
VIT D+METAB SERPL-MCNC: 42.3 NG/ML (ref 30–100)
WBC # BLD AUTO: 8.3 X10(3) UL (ref 4–11)

## 2022-09-29 PROCEDURE — 82164 ANGIOTENSIN I ENZYME TEST: CPT

## 2022-09-29 PROCEDURE — 82306 VITAMIN D 25 HYDROXY: CPT

## 2022-09-29 PROCEDURE — 86039 ANTINUCLEAR ANTIBODIES (ANA): CPT

## 2022-09-29 PROCEDURE — 36415 COLL VENOUS BLD VENIPUNCTURE: CPT

## 2022-09-29 PROCEDURE — 85025 COMPLETE CBC W/AUTO DIFF WBC: CPT

## 2022-09-29 PROCEDURE — 86200 CCP ANTIBODY: CPT

## 2022-09-29 PROCEDURE — 86038 ANTINUCLEAR ANTIBODIES: CPT

## 2022-09-29 PROCEDURE — 86431 RHEUMATOID FACTOR QUANT: CPT

## 2022-09-29 PROCEDURE — 99214 OFFICE O/P EST MOD 30 MIN: CPT | Performed by: PHYSICAL MEDICINE & REHABILITATION

## 2022-09-29 PROCEDURE — 84443 ASSAY THYROID STIM HORMONE: CPT

## 2022-09-29 PROCEDURE — 85652 RBC SED RATE AUTOMATED: CPT

## 2022-09-30 LAB
ANGIOTENSIN CONVERTING ENZYME: 33 U/L
CCP IGG SERPL-ACNC: 1 U/ML (ref 0–6.9)

## 2022-10-02 PROBLEM — E66.3 PATIENT OVERWEIGHT: Status: ACTIVE | Noted: 2022-10-02

## 2022-10-02 PROBLEM — M79.10 MYALGIA, UNSPECIFIED SITE: Status: ACTIVE | Noted: 2022-10-02

## 2022-10-02 PROBLEM — E55.9 VITAMIN D DEFICIENCY, UNSPECIFIED: Status: ACTIVE | Noted: 2022-10-02

## 2022-10-02 PROBLEM — E66.8 OTHER OBESITY: Status: ACTIVE | Noted: 2022-10-02

## 2022-10-02 PROBLEM — M25.50 POLYARTHRALGIA: Status: ACTIVE | Noted: 2022-10-02

## 2022-10-02 PROBLEM — E66.89 OTHER OBESITY: Status: ACTIVE | Noted: 2022-10-02

## 2022-10-03 LAB — NUCLEAR IGG TITR SER IF: POSITIVE {TITER}

## 2022-10-03 RX ORDER — AMITRIPTYLINE HYDROCHLORIDE 100 MG/1
100 TABLET ORAL NIGHTLY
Qty: 90 TABLET | Refills: 0 | Status: SHIPPED | OUTPATIENT
Start: 2022-10-03 | End: 2022-12-19

## 2022-10-03 NOTE — TELEPHONE ENCOUNTER
Please review. Protocol failed / No protocol. Requested Prescriptions   Pending Prescriptions Disp Refills    AMITRIPTYLINE  MG Oral Tab [Pharmacy Med Name: Amitriptyline Hydrochloride 100 Mg Tab Nort] 90 tablet 0     Sig: Take 1 tablet (100 mg total) by mouth nightly.         There is no refill protocol information for this order          Recent Outpatient Visits              4 days ago Obstructive sleep apnea syndrome    [facility]    Office Visit    1 month ago Osteoarthritis of spine with radiculopathy, lumbar region    [address] Chesley Plater, MD    Office Visit    2 months ago Chronic low back pain with sciatica, sciatica laterality unspecified, unspecified back pain laterality    [address] MD    Office Visit    5 months ago Vaccine counseling    [address] Naomia Baas., MD    Office Visit    6 months ago Pain of toe of left foot    [address] Maralee Richardson, MD    Office Visit           Future Appointments         Provider Department Appt Notes    In 3 weeks Misty Alvarez MD [address], [facility]

## 2022-10-05 ENCOUNTER — TELEPHONE (OUTPATIENT)
Dept: PHYSICAL MEDICINE AND REHAB | Facility: CLINIC | Age: 72
End: 2022-10-05

## 2022-10-05 ENCOUNTER — OFFICE VISIT (OUTPATIENT)
Dept: INTERNAL MEDICINE CLINIC | Facility: CLINIC | Age: 72
End: 2022-10-05
Payer: MEDICARE

## 2022-10-05 VITALS
RESPIRATION RATE: 17 BRPM | HEART RATE: 64 BPM | BODY MASS INDEX: 37.03 KG/M2 | HEIGHT: 63 IN | SYSTOLIC BLOOD PRESSURE: 142 MMHG | DIASTOLIC BLOOD PRESSURE: 67 MMHG | OXYGEN SATURATION: 97 % | TEMPERATURE: 98 F | WEIGHT: 209 LBS

## 2022-10-05 DIAGNOSIS — M54.41 ACUTE RIGHT-SIDED LOW BACK PAIN WITH RIGHT-SIDED SCIATICA: Primary | ICD-10-CM

## 2022-10-05 LAB — ANA NUCLEOLAR TITR SER IF: 160 {TITER}

## 2022-10-05 PROCEDURE — 99214 OFFICE O/P EST MOD 30 MIN: CPT | Performed by: INTERNAL MEDICINE

## 2022-10-05 RX ORDER — METHYLPREDNISOLONE 4 MG/1
TABLET ORAL
Qty: 1 EACH | Refills: 0 | Status: SHIPPED | OUTPATIENT
Start: 2022-10-05

## 2022-10-05 RX ORDER — CYCLOBENZAPRINE HCL 5 MG
5 TABLET ORAL NIGHTLY PRN
Qty: 20 TABLET | Refills: 0 | Status: SHIPPED | OUTPATIENT
Start: 2022-10-05 | End: 2022-10-05

## 2022-10-05 NOTE — TELEPHONE ENCOUNTER
----- Message from Azra Locke MD sent at 10/2/2022  5:36 PM CDT -----  Please let the patient know her blood tests are all normal. Please ask the patient to give me a list of her current medications and depending on that, I will prescribe her an arthritis medication. Tongan speaking staff, please. Thanks. Adriano Verduzco

## 2022-10-05 NOTE — TELEPHONE ENCOUNTER
Patient's med list in now current, patient was given a steroid pack by PCP Dr. Lenore Loya 10/5/22. FYI. Also updated in her MyChart was PCP & Pharmacy.

## 2022-10-05 NOTE — TELEPHONE ENCOUNTER
Spoke with patient in regards to labs. Will go over medications with patient once she's home.      Will call after 1:30pm.

## 2022-10-06 NOTE — TELEPHONE ENCOUNTER
Noted thank you. Please let her know that all of her blood tests have returned. 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 It be worthwhile to see one of the rheumatologists. Please give her the appointment number. If there is nothing to treat from their perspective, she should return and we can consider other medications, thank you.

## 2022-10-07 NOTE — TELEPHONE ENCOUNTER
Relayed previous message by Dr. Stella Santoro. Ph# to Rheumatology provided. Patient verbalized understanding and was thankful.

## 2022-10-19 ENCOUNTER — OFFICE VISIT (OUTPATIENT)
Dept: PHYSICAL MEDICINE AND REHAB | Facility: CLINIC | Age: 72
End: 2022-10-19
Payer: MEDICARE

## 2022-10-19 ENCOUNTER — PATIENT MESSAGE (OUTPATIENT)
Dept: PHYSICAL MEDICINE AND REHAB | Facility: CLINIC | Age: 72
End: 2022-10-19

## 2022-10-19 VITALS — OXYGEN SATURATION: 96 % | BODY MASS INDEX: 37.03 KG/M2 | HEIGHT: 63 IN | WEIGHT: 209 LBS | HEART RATE: 89 BPM

## 2022-10-19 DIAGNOSIS — M54.16 LUMBAR RADICULOPATHY: ICD-10-CM

## 2022-10-19 DIAGNOSIS — M25.50 POLYARTHRALGIA: Primary | ICD-10-CM

## 2022-10-19 PROCEDURE — 99213 OFFICE O/P EST LOW 20 MIN: CPT | Performed by: PHYSICAL MEDICINE & REHABILITATION

## 2022-10-21 ENCOUNTER — OFFICE VISIT (OUTPATIENT)
Dept: INTERNAL MEDICINE CLINIC | Facility: CLINIC | Age: 72
End: 2022-10-21
Payer: MEDICARE

## 2022-10-21 ENCOUNTER — HOSPITAL ENCOUNTER (OUTPATIENT)
Dept: GENERAL RADIOLOGY | Age: 72
Discharge: HOME | End: 2022-10-21
Attending: INTERNAL MEDICINE
Payer: MEDICARE

## 2022-10-21 VITALS
DIASTOLIC BLOOD PRESSURE: 79 MMHG | OXYGEN SATURATION: 98 % | HEIGHT: 63 IN | TEMPERATURE: 99 F | SYSTOLIC BLOOD PRESSURE: 134 MMHG | WEIGHT: 209 LBS | HEART RATE: 76 BPM | BODY MASS INDEX: 37.03 KG/M2

## 2022-10-21 DIAGNOSIS — M25.562 ACUTE PAIN OF LEFT KNEE: Primary | ICD-10-CM

## 2022-10-21 DIAGNOSIS — M25.562 ACUTE PAIN OF LEFT KNEE: ICD-10-CM

## 2022-10-21 PROCEDURE — 73562 X-RAY EXAM OF KNEE 3: CPT | Performed by: INTERNAL MEDICINE

## 2022-10-21 PROCEDURE — 99213 OFFICE O/P EST LOW 20 MIN: CPT | Performed by: INTERNAL MEDICINE

## 2022-10-21 RX ORDER — METHYLPREDNISOLONE 4 MG/1
TABLET ORAL
Qty: 1 EACH | Refills: 0 | Status: SHIPPED | OUTPATIENT
Start: 2022-10-21

## 2022-10-24 ENCOUNTER — TELEPHONE (OUTPATIENT)
Dept: INTERNAL MEDICINE CLINIC | Facility: CLINIC | Age: 72
End: 2022-10-24

## 2022-10-24 NOTE — TELEPHONE ENCOUNTER
Attempted to call back. No answer; unable to leave msg. Transfer to triage dept [id number]  See result notes in its entirety for better understanding. Closing this encounter.

## 2022-10-28 ENCOUNTER — OFFICE VISIT (OUTPATIENT)
Dept: INTERNAL MEDICINE CLINIC | Facility: CLINIC | Age: 72
End: 2022-10-28
Payer: MEDICARE

## 2022-10-28 ENCOUNTER — TELEPHONE (OUTPATIENT)
Dept: INTERNAL MEDICINE CLINIC | Facility: CLINIC | Age: 72
End: 2022-10-28

## 2022-10-28 VITALS
HEIGHT: 63 IN | TEMPERATURE: 99 F | HEART RATE: 66 BPM | WEIGHT: 211 LBS | SYSTOLIC BLOOD PRESSURE: 125 MMHG | DIASTOLIC BLOOD PRESSURE: 59 MMHG | BODY MASS INDEX: 37.39 KG/M2 | OXYGEN SATURATION: 96 % | RESPIRATION RATE: 16 BRPM

## 2022-10-28 DIAGNOSIS — Z71.85 VACCINE COUNSELING: ICD-10-CM

## 2022-10-28 DIAGNOSIS — G47.33 OBSTRUCTIVE SLEEP APNEA SYNDROME: ICD-10-CM

## 2022-10-28 DIAGNOSIS — E78.00 HIGH CHOLESTEROL: ICD-10-CM

## 2022-10-28 DIAGNOSIS — E55.9 VITAMIN D DEFICIENCY, UNSPECIFIED: ICD-10-CM

## 2022-10-28 DIAGNOSIS — K21.9 GASTROESOPHAGEAL REFLUX DISEASE WITHOUT ESOPHAGITIS: ICD-10-CM

## 2022-10-28 DIAGNOSIS — R31.29 OTHER MICROSCOPIC HEMATURIA: ICD-10-CM

## 2022-10-28 DIAGNOSIS — M19.019 SHOULDER ARTHRITIS: ICD-10-CM

## 2022-10-28 DIAGNOSIS — M47.816 SPONDYLOSIS OF LUMBAR REGION WITHOUT MYELOPATHY OR RADICULOPATHY: ICD-10-CM

## 2022-10-28 DIAGNOSIS — Z00.00 ENCOUNTER FOR ANNUAL HEALTH EXAMINATION: ICD-10-CM

## 2022-10-28 DIAGNOSIS — G47.00 INSOMNIA, UNSPECIFIED TYPE: ICD-10-CM

## 2022-10-28 DIAGNOSIS — Z12.31 BREAST CANCER SCREENING BY MAMMOGRAM: ICD-10-CM

## 2022-10-28 DIAGNOSIS — E66.01 MORBID (SEVERE) OBESITY DUE TO EXCESS CALORIES (HCC): ICD-10-CM

## 2022-10-28 DIAGNOSIS — R21 RASH AND NONSPECIFIC SKIN ERUPTION: ICD-10-CM

## 2022-10-28 DIAGNOSIS — R73.03 PREDIABETES: ICD-10-CM

## 2022-10-28 DIAGNOSIS — M25.562 ACUTE PAIN OF LEFT KNEE: ICD-10-CM

## 2022-10-28 DIAGNOSIS — E78.5 HYPERLIPIDEMIA, UNSPECIFIED HYPERLIPIDEMIA TYPE: ICD-10-CM

## 2022-10-28 DIAGNOSIS — M25.50 POLYARTHRALGIA: ICD-10-CM

## 2022-10-28 DIAGNOSIS — S92.515D CLOSED NONDISPLACED FRACTURE OF PROXIMAL PHALANX OF LESSER TOE OF LEFT FOOT WITH ROUTINE HEALING, SUBSEQUENT ENCOUNTER: ICD-10-CM

## 2022-10-28 DIAGNOSIS — K76.0 FATTY LIVER DISEASE, NONALCOHOLIC: ICD-10-CM

## 2022-10-28 DIAGNOSIS — M54.12 CERVICAL RADICULOPATHY: ICD-10-CM

## 2022-10-28 DIAGNOSIS — J41.0 SMOKERS' COUGH (HCC): Chronic | ICD-10-CM

## 2022-10-28 DIAGNOSIS — R74.8 ELEVATED LIVER ENZYMES: Primary | ICD-10-CM

## 2022-10-28 PROBLEM — M79.10 MYALGIA, UNSPECIFIED SITE: Status: RESOLVED | Noted: 2022-10-02 | Resolved: 2022-10-28

## 2022-10-28 LAB
ALBUMIN SERPL-MCNC: 3.6 G/DL (ref 3.4–5)
ALBUMIN/GLOB SERPL: 1 {RATIO} (ref 1–2)
ALP LIVER SERPL-CCNC: 89 U/L
ALT SERPL-CCNC: 51 U/L
ANION GAP SERPL CALC-SCNC: 5 MMOL/L (ref 0–18)
APPEARANCE: CLEAR
AST SERPL-CCNC: 35 U/L (ref 15–37)
BILIRUB SERPL-MCNC: 0.3 MG/DL (ref 0.1–2)
BILIRUBIN: NEGATIVE
BUN BLD-MCNC: 11 MG/DL (ref 7–18)
BUN/CREAT SERPL: 17.5 (ref 10–20)
CALCIUM BLD-MCNC: 8.9 MG/DL (ref 8.5–10.1)
CHLORIDE SERPL-SCNC: 106 MMOL/L (ref 98–112)
CHOLEST SERPL-MCNC: 174 MG/DL (ref ?–200)
CO2 SERPL-SCNC: 27 MMOL/L (ref 21–32)
CREAT BLD-MCNC: 0.63 MG/DL
EST. AVERAGE GLUCOSE BLD GHB EST-MCNC: 134 MG/DL (ref 68–126)
FASTING PATIENT LIPID ANSWER: YES
FASTING STATUS PATIENT QL REPORTED: YES
GFR SERPLBLD BASED ON 1.73 SQ M-ARVRAT: 95 ML/MIN/1.73M2 (ref 60–?)
GLOBULIN PLAS-MCNC: 3.6 G/DL (ref 2.8–4.4)
GLUCOSE (URINE DIPSTICK): NEGATIVE MG/DL
GLUCOSE BLD-MCNC: 88 MG/DL (ref 70–99)
HBA1C MFR BLD: 6.3 % (ref ?–5.7)
HDLC SERPL-MCNC: 48 MG/DL (ref 40–59)
KETONES (URINE DIPSTICK): NEGATIVE MG/DL
LDLC SERPL CALC-MCNC: 88 MG/DL (ref ?–100)
LEUKOCYTES: NEGATIVE
MULTISTIX LOT#: ABNORMAL NUMERIC
NITRITE, URINE: NEGATIVE
NONHDLC SERPL-MCNC: 126 MG/DL (ref ?–130)
OSMOLALITY SERPL CALC.SUM OF ELEC: 285 MOSM/KG (ref 275–295)
PH, URINE: 7.5 (ref 4.5–8)
POTASSIUM SERPL-SCNC: 3.9 MMOL/L (ref 3.5–5.1)
PROT SERPL-MCNC: 7.2 G/DL (ref 6.4–8.2)
PROTEIN (URINE DIPSTICK): NEGATIVE MG/DL
SODIUM SERPL-SCNC: 138 MMOL/L (ref 136–145)
SPECIFIC GRAVITY: 1.01 (ref 1–1.03)
TRIGL SERPL-MCNC: 228 MG/DL (ref 30–149)
URINE-COLOR: YELLOW
UROBILINOGEN,SEMI-QN: 0.2 MG/DL (ref 0–1.9)
VIT D+METAB SERPL-MCNC: 40.6 NG/ML (ref 30–100)
VLDLC SERPL CALC-MCNC: 37 MG/DL (ref 0–30)

## 2022-10-28 PROCEDURE — 36415 COLL VENOUS BLD VENIPUNCTURE: CPT | Performed by: INTERNAL MEDICINE

## 2022-10-28 RX ORDER — CYCLOBENZAPRINE HCL 5 MG
TABLET ORAL
COMMUNITY
Start: 2022-10-05

## 2022-10-28 RX ORDER — BETAMETHASONE DIPROPIONATE 0.5 MG/G
30 CREAM TOPICAL 2 TIMES DAILY
Qty: 30 G | Refills: 2 | Status: SHIPPED | OUTPATIENT
Start: 2022-10-28

## 2022-10-28 RX ORDER — CELECOXIB 100 MG/1
100 CAPSULE ORAL 2 TIMES DAILY
Qty: 30 CAPSULE | Refills: 0 | Status: SHIPPED | OUTPATIENT
Start: 2022-10-28

## 2022-10-29 NOTE — TELEPHONE ENCOUNTER
Dr. Fidel White - only non-augmented cream available:   Betamethasone Dipropionate 0.05% Cream  45g tube. Okay to swap and use Non-augmented cream?   Please advise. RN [employer] [employer]. Patient's date of birth and full name both confirmed. Spoke with Amber Renteria, pharmacist. She clarified this information noted here. Routing to PCP for guidance.

## 2022-10-29 NOTE — TELEPHONE ENCOUNTER
Several call attempts because line kept disconnecting. RN evly. Patient's date of birth and full name both confirmed. Spoke with Aries Trevizo.   RN informed of provider's message as detailed below. She verbalizes understanding of all information, and agreeable to plan.

## 2022-11-02 ENCOUNTER — OFFICE VISIT (OUTPATIENT)
Dept: RHEUMATOLOGY | Facility: CLINIC | Age: 72
End: 2022-11-02
Payer: MEDICARE

## 2022-11-02 ENCOUNTER — LAB ENCOUNTER (OUTPATIENT)
Dept: LAB | Facility: HOSPITAL | Age: 72
End: 2022-11-02
Attending: INTERNAL MEDICINE
Payer: MEDICARE

## 2022-11-02 VITALS
BODY MASS INDEX: 37.39 KG/M2 | SYSTOLIC BLOOD PRESSURE: 121 MMHG | WEIGHT: 211 LBS | HEART RATE: 67 BPM | RESPIRATION RATE: 16 BRPM | HEIGHT: 63 IN | DIASTOLIC BLOOD PRESSURE: 70 MMHG

## 2022-11-02 DIAGNOSIS — M25.50 POLYARTHRALGIA: ICD-10-CM

## 2022-11-02 DIAGNOSIS — M79.10 MYALGIA: ICD-10-CM

## 2022-11-02 DIAGNOSIS — K76.0 FATTY LIVER: ICD-10-CM

## 2022-11-02 DIAGNOSIS — M11.20 CHONDROCALCINOSIS: ICD-10-CM

## 2022-11-02 DIAGNOSIS — R76.8 POSITIVE ANA (ANTINUCLEAR ANTIBODY): ICD-10-CM

## 2022-11-02 DIAGNOSIS — R76.8 POSITIVE ANA (ANTINUCLEAR ANTIBODY): Primary | ICD-10-CM

## 2022-11-02 DIAGNOSIS — M17.10 PRIMARY OSTEOARTHRITIS OF KNEE, UNSPECIFIED LATERALITY: ICD-10-CM

## 2022-11-02 LAB
C3 SERPL-MCNC: 156 MG/DL (ref 90–180)
C4 SERPL-MCNC: 30.8 MG/DL (ref 10–40)
CK SERPL-CCNC: 96 U/L

## 2022-11-02 PROCEDURE — 36415 COLL VENOUS BLD VENIPUNCTURE: CPT

## 2022-11-02 PROCEDURE — 86235 NUCLEAR ANTIGEN ANTIBODY: CPT

## 2022-11-02 PROCEDURE — 86160 COMPLEMENT ANTIGEN: CPT

## 2022-11-02 PROCEDURE — 86381 MITOCHONDRIAL ANTIBODY EACH: CPT

## 2022-11-02 PROCEDURE — 99214 OFFICE O/P EST MOD 30 MIN: CPT | Performed by: INTERNAL MEDICINE

## 2022-11-02 PROCEDURE — 86256 FLUORESCENT ANTIBODY TITER: CPT

## 2022-11-02 PROCEDURE — 86225 DNA ANTIBODY NATIVE: CPT

## 2022-11-02 PROCEDURE — 82085 ASSAY OF ALDOLASE: CPT

## 2022-11-02 PROCEDURE — 82550 ASSAY OF CK (CPK): CPT

## 2022-11-02 RX ORDER — GABAPENTIN 300 MG/1
300 CAPSULE ORAL NIGHTLY
Qty: 30 CAPSULE | Refills: 1 | Status: SHIPPED | OUTPATIENT
Start: 2022-11-02

## 2022-11-02 NOTE — PATIENT INSTRUCTIONS
1. Check labs   2. Cont. celebrex 100mg twice a day   3. Cont. Amitriptyline 100mg at night   4. Consider gabapentin for radiculopathy pain - try gabapentin 300mg at night for pain -   5. Return to clinic in 3 weeks. 6. Await mri lumbar spine - in 1-2 weeks. 7. Cont. cyxclobenzaprine 5mg at night as needed.

## 2022-11-03 LAB
DSDNA AB TITR SER: <10 {TITER}
MITOCHONDRIA AB TITR SER: <20 {TITER}
SMOOTH MUSCLE AB TITR SER: <20 {TITER}

## 2022-11-04 LAB
ALDOLASE, SERUM: 5.7 U/L
ENA RNP IGG SER IA-ACNC: 1.3 U/ML
ENA SCL70 IGG SER IA-ACNC: <0.6 U/ML
ENA SM IGG SER IA-ACNC: <0.7 U/ML
ENA SS-A IGG SER IA-ACNC: <0.4 U/ML
ENA SS-B IGG SER IA-ACNC: <0.4 U/ML
U1 SNRNP IGG SER IA-ACNC: 1 U/ML

## 2022-11-10 ENCOUNTER — HOSPITAL ENCOUNTER (OUTPATIENT)
Dept: MRI IMAGING | Facility: HOSPITAL | Age: 72
Discharge: HOME | End: 2022-11-10
Attending: PHYSICAL MEDICINE & REHABILITATION
Payer: MEDICARE

## 2022-11-10 DIAGNOSIS — M54.16 LUMBAR RADICULOPATHY: ICD-10-CM

## 2022-11-10 PROCEDURE — 72148 MRI LUMBAR SPINE W/O DYE: CPT | Performed by: PHYSICAL MEDICINE & REHABILITATION

## 2022-11-15 ENCOUNTER — OFFICE VISIT (OUTPATIENT)
Dept: OBGYN CLINIC | Facility: CLINIC | Age: 72
End: 2022-11-15
Payer: MEDICARE

## 2022-11-15 VITALS — SYSTOLIC BLOOD PRESSURE: 128 MMHG | DIASTOLIC BLOOD PRESSURE: 70 MMHG | BODY MASS INDEX: 37 KG/M2 | WEIGHT: 210.81 LBS

## 2022-11-15 DIAGNOSIS — Z01.419 WELL WOMAN EXAM WITH ROUTINE GYNECOLOGICAL EXAM: Primary | ICD-10-CM

## 2022-11-15 PROBLEM — Z71.85 VACCINE COUNSELING: Status: RESOLVED | Noted: 2020-09-28 | Resolved: 2022-11-15

## 2022-11-15 RX ORDER — BETAMETHASONE DIPROPIONATE 0.5 MG/G
1 CREAM TOPICAL 2 TIMES DAILY
COMMUNITY
Start: 2022-10-29

## 2022-11-16 ENCOUNTER — TELEPHONE (OUTPATIENT)
Dept: INTERNAL MEDICINE CLINIC | Facility: CLINIC | Age: 72
End: 2022-11-16

## 2022-11-16 NOTE — TELEPHONE ENCOUNTER
Dr. Makeda Jensen please advise on 10/28/22 lab results. Thank you. MRI lumbar spine on file from Dr. Manuel Gilmore.

## 2022-11-16 NOTE — TELEPHONE ENCOUNTER
Patient does not recall speaking with an RN about her lab or MRI results. Patient is requesting a call to go over lab results from 10/28 and MRI results from 10/21.  Please advise

## 2022-11-17 NOTE — TELEPHONE ENCOUNTER
Dr Shweta Ortiz: respectfully, I do not see result comments regarding blood testing . I only saw a comment on her Urine. Before we call patient, do you want to repeat tests in 6 month? Her triglycerides appear better 128 from 152; her hgba1c 6.3. still in pre-diabetic range.

## 2022-11-17 NOTE — TELEPHONE ENCOUNTER
We can go over the results from October 28 which are the results already in the system from me. She needs to call Dr. Alea Decker and get the results from Dr. Alea Decker regarding the MRI of the lumbar spine.   It does show just osteoarthritis changes and some spinal stenosis at the level below the waist.

## 2022-11-18 ENCOUNTER — TELEPHONE (OUTPATIENT)
Dept: INTERNAL MEDICINE CLINIC | Facility: CLINIC | Age: 72
End: 2022-11-18

## 2022-11-18 ENCOUNTER — TELEPHONE (OUTPATIENT)
Dept: PHYSICAL MEDICINE AND REHAB | Facility: CLINIC | Age: 72
End: 2022-11-18

## 2022-11-18 ENCOUNTER — TELEPHONE (OUTPATIENT)
Dept: RHEUMATOLOGY | Facility: CLINIC | Age: 72
End: 2022-11-18

## 2022-11-18 RX ORDER — METHYLPREDNISOLONE 4 MG/1
TABLET ORAL
Qty: 1 EACH | Refills: 0 | Status: SHIPPED | OUTPATIENT
Start: 2022-11-18

## 2022-11-18 NOTE — TELEPHONE ENCOUNTER
Using language line : Cheryl Pop ID number [id number]    Patient asking to make follow up appointment with Dr. Danisha Bird. Patient was transferred to office, nothing further needed from out department at this time.

## 2022-11-18 NOTE — TELEPHONE ENCOUNTER
Please let pt. Know that I Will send  in medrol susanne   She  Has  an appt.  On 11/23    (She has some changes on her left knee that might be helepd with steroids) (chondrocalcinosis)

## 2022-11-18 NOTE — TELEPHONE ENCOUNTER
Urinalysis looks clear. Urine culture shows no evidence of urinary tract infection. Vitamin D level looks good. Continue same. Hemoglobin A1c which is a 3-month average of sugars   Looks good. Goal is 6.5 or less. But careful can about the borderline. Lower carb diet. Would recheck in 6 months. Lipid panel shows LDL is good. Goal is less than 100. HDL should be above 50. Triglycerides should be less than 150. Higher carb diet raises triglycerides. Lower carbs will help lower triglycerides. Also adding fish oil thousand a day helps lower triglycerides. Recheck in 3 months. CMP which is electrolytes kidney and liver functions all look good. Then Dr. Wander cerna ordered some more labs.

## 2023-01-10 ENCOUNTER — TELEPHONE (OUTPATIENT)
Dept: INTERNAL MEDICINE CLINIC | Facility: CLINIC | Age: 73
End: 2023-01-10

## 2023-01-26 ENCOUNTER — OFFICE VISIT (OUTPATIENT)
Dept: INTERNAL MEDICINE CLINIC | Facility: CLINIC | Age: 73
End: 2023-01-26

## 2023-01-26 VITALS
HEIGHT: 63 IN | BODY MASS INDEX: 38.06 KG/M2 | OXYGEN SATURATION: 99 % | HEART RATE: 67 BPM | SYSTOLIC BLOOD PRESSURE: 102 MMHG | DIASTOLIC BLOOD PRESSURE: 64 MMHG | WEIGHT: 214.81 LBS

## 2023-01-26 DIAGNOSIS — R30.0 DYSURIA: ICD-10-CM

## 2023-01-26 DIAGNOSIS — R73.03 PREDIABETES: Primary | ICD-10-CM

## 2023-01-26 LAB
BILIRUB UR QL: NEGATIVE
BILIRUBIN: NEGATIVE
CLARITY UR: CLEAR
COLOR UR: YELLOW
GLUCOSE (URINE DIPSTICK): NEGATIVE MG/DL
GLUCOSE UR-MCNC: NEGATIVE MG/DL
KETONES (URINE DIPSTICK): NEGATIVE MG/DL
KETONES UR-MCNC: NEGATIVE MG/DL
MULTISTIX LOT#: ABNORMAL NUMERIC
NITRITE UR QL STRIP.AUTO: NEGATIVE
NITRITE, URINE: NEGATIVE
PH UR: 7 [PH] (ref 5–8)
PH, URINE: 7 (ref 4.5–8)
PROT UR-MCNC: NEGATIVE MG/DL
PROTEIN (URINE DIPSTICK): NEGATIVE MG/DL
SP GR UR STRIP: 1.01 (ref 1–1.03)
SPECIFIC GRAVITY: 1.01 (ref 1–1.03)
URINE-COLOR: YELLOW
UROBILINOGEN UR STRIP-ACNC: 0.2
UROBILINOGEN,SEMI-QN: 0.2 MG/DL (ref 0–1.9)

## 2023-01-26 PROCEDURE — 81003 URINALYSIS AUTO W/O SCOPE: CPT | Performed by: NURSE PRACTITIONER

## 2023-01-26 PROCEDURE — 99214 OFFICE O/P EST MOD 30 MIN: CPT | Performed by: NURSE PRACTITIONER

## 2023-01-26 RX ORDER — NITROFURANTOIN 25; 75 MG/1; MG/1
100 CAPSULE ORAL 2 TIMES DAILY
Qty: 20 CAPSULE | Refills: 0 | Status: SHIPPED | OUTPATIENT
Start: 2023-01-26 | End: 2023-02-05

## 2023-01-26 NOTE — PATIENT INSTRUCTIONS
ASSESSMENT/PLAN:   Prediabetes  (primary encounter diagnosis)  Careful with diet and excercise at least 30 minutes 3-4 times a week. Decrease carbohydrates. But also, careful with fruits and natural sugars. One serving a day and no more than 1 handful every day. Dysuria  Empirically starting on antibiotics. -encourage fluids 8-12 glasses of non-caffeinated fluids/day  -encourage drinking cranberry juice  -urinate after intercourse  -keep good hygiene, avoid harsh soaps and lotions to perineal area  -females-wipe front to back  -tylenol or ibuprofen for pain, fever  -call if increase in lower back pain or fever >100.5  -complete course of antibiotics as prescribed-not completing course of antibiotics may result in infection not fully eradicated or may lead to antibiotic resistance  -eat probiotic yogurt (Activia)  or take probiotic capsules i.e Align or Florastor (sprinkles are available for children)  -over the counter AZO will help relieve pain-1 tablet, three times a day for 2 days-you urine will turn ORANGE!    -take antibiotics on full stomach unless noted otherwise  -Call or return to office if symptoms are not markedly improved within 3-4 days or if symptoms are worsening      Orders Placed This Encounter      URINALYSIS, AUTO, W/O SCOPE      Urinalysis, Routine      Urine Culture, Routine  Follow-up as scheduled or sooner if needed. Meds This Visit:  Requested Prescriptions     Signed Prescriptions Disp Refills    nitrofurantoin monohydrate macro (MACROBID) 100 MG Oral Cap 20 capsule 0     Sig: Take 1 capsule (100 mg total) by mouth 2 (two) times daily for 10 days.        Imaging & Referrals:  None

## 2023-02-13 RX ORDER — HYDROCORTISONE 25 MG/G
1 CREAM TOPICAL 2 TIMES DAILY
Qty: 28 G | Refills: 3 | Status: SHIPPED | OUTPATIENT
Start: 2023-02-13

## 2023-02-13 NOTE — TELEPHONE ENCOUNTER
Refill passed per [place] protocol. Requested Prescriptions   Pending Prescriptions Disp Refills    PROCTO-MED HC 2.5 % External Cream [Pharmacy Med Name: Procto-Med Hc 2.5 % Cre Lead] 28 g 0     Sig: Place 1 Application rectally 2 (two) times daily.        Gastrointestional Medication Protocol Passed - 2/13/2023  9:52 AM        Passed - In person appointment or virtual visit in the past 12 mos or appointment in next 3 mos     Recent Outpatient Visits              2 weeks ago [facility]    Office Visit    3 months ago Well woman exam with routine gynecological exam    Genesis Pop MD    Office Visit    3 months ago Positive KIRA (antinuclear antibody)    [facility]    Office Visit    3 months ago Elevated liver enzymes    [address], Coleen Morton MD    Office Visit    3 months ago Acute pain of left knee    [address], Joseph Julio MD    Office Visit          Future Appointments         Provider Department Appt Notes    In 2 months Jens Wyatt MD [address], [facility] follow up 6 month

## 2023-03-02 ENCOUNTER — HOSPITAL ENCOUNTER (EMERGENCY)
Facility: HOSPITAL | Age: 73
Discharge: HOME | End: 2023-03-02
Attending: EMERGENCY MEDICINE
Payer: MEDICARE

## 2023-03-02 ENCOUNTER — NURSE TRIAGE (OUTPATIENT)
Dept: INTERNAL MEDICINE CLINIC | Facility: CLINIC | Age: 73
End: 2023-03-02

## 2023-03-02 ENCOUNTER — TELEPHONE (OUTPATIENT)
Dept: INTERNAL MEDICINE CLINIC | Facility: CLINIC | Age: 73
End: 2023-03-02

## 2023-03-02 VITALS
DIASTOLIC BLOOD PRESSURE: 73 MMHG | OXYGEN SATURATION: 96 % | TEMPERATURE: 98 F | WEIGHT: 215 LBS | RESPIRATION RATE: 18 BRPM | HEART RATE: 89 BPM | BODY MASS INDEX: 38 KG/M2 | SYSTOLIC BLOOD PRESSURE: 138 MMHG

## 2023-03-02 DIAGNOSIS — J02.0 ACUTE STREPTOCOCCAL PHARYNGITIS: Primary | ICD-10-CM

## 2023-03-02 DIAGNOSIS — U07.1 COVID-19: ICD-10-CM

## 2023-03-02 LAB
S PYO AG THROAT QL: POSITIVE
SARS-COV-2 RNA RESP QL NAA+PROBE: DETECTED

## 2023-03-02 PROCEDURE — 87880 STREP A ASSAY W/OPTIC: CPT

## 2023-03-02 PROCEDURE — 99284 EMERGENCY DEPT VISIT MOD MDM: CPT

## 2023-03-02 PROCEDURE — 99283 EMERGENCY DEPT VISIT LOW MDM: CPT

## 2023-03-02 RX ORDER — AMOXICILLIN 500 MG/1
500 TABLET, FILM COATED ORAL 2 TIMES DAILY
Qty: 20 TABLET | Refills: 0 | Status: SHIPPED | OUTPATIENT
Start: 2023-03-02 | End: 2023-03-12

## 2023-03-02 NOTE — TELEPHONE ENCOUNTER
Archana spoke with patient states she is feeling better, she was having difficulty breathing yesterday states that she is taking the medication and feels better.

## 2023-03-06 ENCOUNTER — TELEPHONE (OUTPATIENT)
Dept: INTERNAL MEDICINE CLINIC | Facility: CLINIC | Age: 73
End: 2023-03-06

## 2023-03-06 DIAGNOSIS — G47.33 OBSTRUCTIVE SLEEP APNEA SYNDROME: Primary | ICD-10-CM

## 2023-03-06 NOTE — TELEPHONE ENCOUNTER
Order pended, please edit/ sign if agreeable and route back to triage support so that we may fax and notify patient.

## 2023-03-06 NOTE — TELEPHONE ENCOUNTER
Patient is calling to advise PCP she continues to use the CPAP machine and is requesting supplies. Patient mentions she uses a new machine and returned the one that was recalled. Patient needs assistance with getting new supplies. Patient mentions she has tried calling a site directly but was advised they have contact PCP's office and have not heard back. Patient is requesting a call back with update.

## 2023-03-07 NOTE — TELEPHONE ENCOUNTER
Faxed order, sleep study, insurance information and office notes to [facility]. Notified patient.

## 2023-03-10 NOTE — TELEPHONE ENCOUNTER
Received message from Taye Calixto requesting the last sleep study and office notes stating that patient is benefiting from CPAP use. Dr. Maddy Chadwick - you will need to addend office notes from 10/28/2022 stating that patient is benefiting from CPAP use. Once notes are updated, please fax sleep study and notes to [facility].

## 2023-04-01 NOTE — PROGRESS NOTES
HPI/Subjective:   Patient ID: Angie Grace is a 78year old female.     HPI    Pt comes in with complaint of rash to rt upper chest for about a week, its itch and uncomfortable, its red and inflamed, also feels some itchiness to left side of neck and face Pulmonary note appreciated  Patient was recommended to use oxygen while ambulating initially she has denied it now if she feels she will need the oxygen while ambulating I recommended her to get in touch with the pulmonary and follow-up  COMMENTS)    Comment:Allergy Test Screening  Fish                    OTHER (SEE COMMENTS)    Comment:Allergy Test Screening  Fish-Derived Produc*    OTHER (SEE COMMENTS)    Comment:Allergy Test Screening  Other                   OTHER (SEE COMMENTS)    Com know   Allergic reaction, initial encounter - as above     No orders of the defined types were placed in this encounter.       Meds This Visit:  Requested Prescriptions     Signed Prescriptions Disp Refills   • Clobetasol Propionate (CLOBEX) 0.05 % External

## 2023-04-06 ENCOUNTER — MED REC SCAN ONLY (OUTPATIENT)
Dept: INTERNAL MEDICINE CLINIC | Facility: CLINIC | Age: 73
End: 2023-04-06

## 2023-05-04 ENCOUNTER — TELEPHONE (OUTPATIENT)
Dept: INTERNAL MEDICINE CLINIC | Facility: CLINIC | Age: 73
End: 2023-05-04

## 2023-05-04 NOTE — TELEPHONE ENCOUNTER
I spoke to patient and his son letting them know we receive a fax from [address] [place] [address] saying patient cancel the order. Per patient she has a new  CPAPmachine. Someone call her asking for her insurance card and she needed to pay $119 and they got scared  And told them she didn't want anything. I gave them Home medical number for them to call.

## 2023-05-08 RX ORDER — MONTELUKAST SODIUM 10 MG/1
10 TABLET ORAL NIGHTLY PRN
Qty: 90 TABLET | Refills: 3 | Status: SHIPPED | OUTPATIENT
Start: 2023-05-08

## 2023-05-08 NOTE — TELEPHONE ENCOUNTER
Refill passed per [place] [employer], [facility] protocol    Requested Prescriptions   Pending Prescriptions Disp Refills    MONTELUKAST 10 MG Oral Tab [Pharmacy Med Name: Montelukast Sodium 10 Mg Tab Auro] 90 tablet 3     Sig: Take 1 tablet (10 mg total) by mouth nightly as needed.        Asthma & COPD Medication Protocol Passed - 5/8/2023  9:43 AM        Passed - In person appointment or virtual visit in the past 6 mos or appointment in next 3 mos     Recent Outpatient Visits              3 months ago [facility]    Office Visit    5 months ago Well woman exam with routine gynecological exam    Shari Mathis MD    Office Visit    6 months ago Positive KIRA (antinuclear antibody)    [facility]    Office Visit    6 months ago Elevated liver enzymes    Luis M Andujar MD    Office Visit    6 months ago Acute pain of left knee    Bhargav Andujar MD    Office Visit          Future Appointments         Provider Department Appt Notes    In 3 days Phyllis Karimi.MD [place], [address], [place] follow up 6 month* DUE FOR MEDICARE [address]

## 2023-05-26 ENCOUNTER — LAB ENCOUNTER (OUTPATIENT)
Dept: LAB | Facility: REFERENCE LAB | Age: 73
End: 2023-05-26
Attending: INTERNAL MEDICINE
Payer: MEDICARE

## 2023-05-26 ENCOUNTER — ORDER TRANSCRIPTION (OUTPATIENT)
Dept: ADMINISTRATIVE | Facility: HOSPITAL | Age: 73
End: 2023-05-26

## 2023-05-26 ENCOUNTER — OFFICE VISIT (OUTPATIENT)
Dept: INTERNAL MEDICINE CLINIC | Facility: CLINIC | Age: 73
End: 2023-05-26

## 2023-05-26 ENCOUNTER — TELEPHONE (OUTPATIENT)
Dept: INTERNAL MEDICINE CLINIC | Facility: CLINIC | Age: 73
End: 2023-05-26

## 2023-05-26 VITALS
HEART RATE: 77 BPM | WEIGHT: 218 LBS | BODY MASS INDEX: 38.62 KG/M2 | RESPIRATION RATE: 16 BRPM | DIASTOLIC BLOOD PRESSURE: 58 MMHG | OXYGEN SATURATION: 98 % | TEMPERATURE: 97 F | SYSTOLIC BLOOD PRESSURE: 131 MMHG | HEIGHT: 63 IN

## 2023-05-26 DIAGNOSIS — R21 RASH: ICD-10-CM

## 2023-05-26 DIAGNOSIS — Z12.31 BREAST CANCER SCREENING BY MAMMOGRAM: ICD-10-CM

## 2023-05-26 DIAGNOSIS — Z12.31 ENCOUNTER FOR SCREENING MAMMOGRAM FOR MALIGNANT NEOPLASM OF BREAST: Primary | ICD-10-CM

## 2023-05-26 DIAGNOSIS — M19.90 ARTHRITIS: ICD-10-CM

## 2023-05-26 DIAGNOSIS — L20.89 OTHER ATOPIC DERMATITIS: ICD-10-CM

## 2023-05-26 DIAGNOSIS — E78.00 HIGH CHOLESTEROL: ICD-10-CM

## 2023-05-26 DIAGNOSIS — R73.03 PREDIABETES: ICD-10-CM

## 2023-05-26 DIAGNOSIS — E55.9 VITAMIN D DEFICIENCY, UNSPECIFIED: ICD-10-CM

## 2023-05-26 DIAGNOSIS — G47.33 OBSTRUCTIVE SLEEP APNEA SYNDROME: Primary | ICD-10-CM

## 2023-05-26 LAB
CHOLEST SERPL-MCNC: 175 MG/DL (ref ?–200)
ERYTHROCYTE [SEDIMENTATION RATE] IN BLOOD: 34 MM/HR
FASTING PATIENT LIPID ANSWER: NO
HDLC SERPL-MCNC: 55 MG/DL (ref 40–59)
LDLC SERPL CALC-MCNC: 90 MG/DL (ref ?–100)
NONHDLC SERPL-MCNC: 120 MG/DL (ref ?–130)
TRIGL SERPL-MCNC: 174 MG/DL (ref 30–149)
URATE SERPL-MCNC: 5.9 MG/DL
VLDLC SERPL CALC-MCNC: 28 MG/DL (ref 0–30)

## 2023-05-26 PROCEDURE — 86003 ALLG SPEC IGE CRUDE XTRC EA: CPT

## 2023-05-26 PROCEDURE — 36415 COLL VENOUS BLD VENIPUNCTURE: CPT

## 2023-05-26 PROCEDURE — 99214 OFFICE O/P EST MOD 30 MIN: CPT | Performed by: INTERNAL MEDICINE

## 2023-05-26 PROCEDURE — 84550 ASSAY OF BLOOD/URIC ACID: CPT

## 2023-05-26 PROCEDURE — 82785 ASSAY OF IGE: CPT

## 2023-05-26 PROCEDURE — 85652 RBC SED RATE AUTOMATED: CPT

## 2023-05-26 PROCEDURE — 80061 LIPID PANEL: CPT

## 2023-05-26 RX ORDER — CELECOXIB 100 MG/1
100 CAPSULE ORAL 2 TIMES DAILY
Qty: 60 CAPSULE | Refills: 1 | Status: SHIPPED | OUTPATIENT
Start: 2023-05-26

## 2023-05-26 RX ORDER — LEVOCETIRIZINE DIHYDROCHLORIDE 5 MG/1
5 TABLET, FILM COATED ORAL EVERY EVENING
Qty: 7 TABLET | Refills: 0 | Status: SHIPPED | OUTPATIENT
Start: 2023-05-26 | End: 2023-06-02

## 2023-05-29 NOTE — PROGRESS NOTES
Lipid (choilesterol) HDL is good above 50 is the goal.  LDL is good. But triglycerides are elevated. But better than prior. Lower carbs helps lower triglycerides. Goal triglycerides is less than 150. Careful with diet. Avoid red meat, shellfish, pork. Try not to lepe foods. Lower carb diet. Exercise is most part at least 30 minutes 3-4 times a week sustained cardiovascular aerobic exercise getting that heart rate going and keeping it going for 30 minutes at a time. If cannot do 30 will start with 10 minutes of brisk walking is good at each week build up 5 minutes more. Recheck lipid in 3 to 6 months. Orders written for fasting labs. Uric acid is normal.  Sed rate which is a nonspecific marker of inflammation is slightly elevated. Would recheck in 3 months.

## 2023-05-31 LAB
A ALTERNATA IGE QN: <0.1 KUA/L (ref ?–0.1)
A FUMIGATUS IGE QN: <0.1 KUA/L (ref ?–0.1)
AMER SYCAMORE IGE QN: <0.1 KUA/L (ref ?–0.1)
BERMUDA GRASS IGE QN: <0.1 KUA/L (ref ?–0.1)
BOXELDER IGE QN: <0.1 KUA/L (ref ?–0.1)
C HERBARUM IGE QN: <0.1 KUA/L (ref ?–0.1)
CALIF WALNUT IGE QN: <0.1 KUA/L (ref ?–0.1)
CAT DANDER IGE QN: <0.1 KUA/L (ref ?–0.1)
CLAM IGE QN: <0.1 KUA/L (ref ?–0.1)
CMN PIGWEED IGE QN: <0.1 KUA/L (ref ?–0.1)
CODFISH IGE QN: 0.75 KUA/L (ref ?–0.1)
COMMON RAGWEED IGE QN: <0.1 KUA/L (ref ?–0.1)
CORN IGE QN: <0.1 KUA/L (ref ?–0.1)
COTTONWOOD IGE QN: <0.1 KUA/L (ref ?–0.1)
COW MILK IGE QN: <0.1 KUA/L (ref ?–0.1)
D FARINAE IGE QN: 0.11 KUA/L (ref ?–0.1)
D PTERONYSS IGE QN: 0.11 KUA/L (ref ?–0.1)
DOG DANDER IGE QN: <0.1 KUA/L (ref ?–0.1)
EGG WHITE IGE QN: <0.1 KUA/L (ref ?–0.1)
GOOSEFOOT IGE QN: <0.1 KUA/L (ref ?–0.1)
HOUSE DUST HS IGE QN: <0.1 KUA/L (ref ?–0.1)
IGE SERPL-ACNC: 128 KU/L (ref 2–214)
IGE SERPL-ACNC: 133 KU/L (ref 2–214)
KENT BLUE GRASS IGE QN: <0.1 KUA/L (ref ?–0.1)
M RACEMOSUS IGE QN: <0.1 KUA/L (ref ?–0.1)
MARSH ELDER IGE QN: <0.1 KUA/L (ref ?–0.1)
MOUSE EPITH IGE QN: <0.1 KUA/L (ref ?–0.1)
MT JUNIPER IGE QN: 0.16 KUA/L (ref ?–0.1)
P NOTATUM IGE QN: <0.1 KUA/L (ref ?–0.1)
PEANUT IGE QN: <0.1 KUA/L (ref ?–0.1)
PECAN/HICK TREE IGE QN: <0.1 KUA/L (ref ?–0.1)
PER RYE GRASS IGE QN: <0.1 KUA/L (ref ?–0.1)
ROACH IGE QN: <0.1 KUA/L (ref ?–0.1)
SALTWORT IGE QN: <0.1 KUA/L (ref ?–0.1)
SCALLOP IGE QN: <0.1 KUA/L (ref ?–0.1)
SESAME SEED IGE QN: <0.1 KUA/L (ref ?–0.1)
SHRIMP IGE QN: 0.3 KUA/L (ref ?–0.1)
SOYBEAN IGE QN: <0.1 KUA/L (ref ?–0.1)
TIMOTHY IGE QN: <0.1 KUA/L (ref ?–0.1)
WALNUT IGE QN: <0.1 KUA/L (ref ?–0.1)
WHEAT IGE QN: 0.12 KUA/L (ref ?–0.1)
WHITE ASH IGE QN: <0.1 KUA/L (ref ?–0.1)
WHITE ELM IGE QN: <0.1 KUA/L (ref ?–0.1)
WHITE MULBERRY IGE QN: <0.1 KUA/L (ref ?–0.1)
WHITE OAK IGE QN: <0.1 KUA/L (ref ?–0.1)

## 2023-06-06 ENCOUNTER — HOSPITAL ENCOUNTER (OUTPATIENT)
Dept: MAMMOGRAPHY | Age: 73
Discharge: HOME | End: 2023-06-06
Attending: INTERNAL MEDICINE
Payer: MEDICARE

## 2023-06-06 DIAGNOSIS — Z12.31 ENCOUNTER FOR SCREENING MAMMOGRAM FOR MALIGNANT NEOPLASM OF BREAST: ICD-10-CM

## 2023-06-06 PROCEDURE — 77067 SCR MAMMO BI INCL CAD: CPT | Performed by: INTERNAL MEDICINE

## 2023-06-06 PROCEDURE — 77063 BREAST TOMOSYNTHESIS BI: CPT | Performed by: INTERNAL MEDICINE

## 2023-07-26 RX ORDER — CLOTRIMAZOLE AND BETAMETHASONE DIPROPIONATE 10; .64 MG/G; MG/G
CREAM TOPICAL
Qty: 60 G | Refills: 0 | Status: SHIPPED | OUTPATIENT
Start: 2023-07-26

## 2023-07-26 NOTE — TELEPHONE ENCOUNTER
Please call patient, see if they requested this refill.    Most recent rx written by ob/gyn 1/21/2021  Written by Dr. Jeison Zayas 2017, marked therapy completed 5/13/2020

## 2023-07-26 NOTE — TELEPHONE ENCOUNTER
Spoke with patient with language line  as documented. Was previously prescribed by Dr. Nasario Riedel for fungal irritation in her groin. She continues with this in the hot weather. She requests Dr. Chela Elias to refill. Please advise.

## 2023-07-27 ENCOUNTER — TELEPHONE (OUTPATIENT)
Dept: OBGYN CLINIC | Facility: CLINIC | Age: 73
End: 2023-07-27

## 2023-07-27 NOTE — TELEPHONE ENCOUNTER
Pt states she has vaginal itching and odor, hoping to get something prescribed for a yeast/ fungal infection.  Please advise fungal irritation

## 2023-07-28 NOTE — TELEPHONE ENCOUNTER
Jamaican PHONE LINE  #[phone] USED TO TRANSLATE PHONE CALL. Pt voices she has occasional vaginal irritation and itching. No discharge. Pt would also like to schedule annual with Dr. Cyrus Mccarty. Pt last annual was 2021.  Pt scheduled to see Dr. Cyrus Mccarty on 08/15/23 at 3 pm. Offered pt earlier appointment with Margi loera, BUT PT DECLINED

## 2023-08-22 RX ORDER — PANTOPRAZOLE SODIUM 40 MG/1
40 TABLET, DELAYED RELEASE ORAL
Qty: 90 TABLET | Refills: 3 | Status: SHIPPED | OUTPATIENT
Start: 2023-08-22

## 2023-08-22 NOTE — TELEPHONE ENCOUNTER
Refill passed per [place] [employer] [facility] protocol.   Requested Prescriptions   Pending Prescriptions Disp Refills    PANTOPRAZOLE 40 MG Oral Tab EC [Pharmacy Med Name: Pantoprazole Sodium Ec 40 Mg Tab Auro] 90 tablet 0     Sig: Take 1 tablet (40 mg total) by mouth before breakfast.       Gastrointestional Medication Protocol Passed - 8/21/2023  9:35 AM        Passed - In person appointment or virtual visit in the past 12 mos or appointment in next 3 mos     Recent Outpatient Visits              2 months ago Obstructive sleep apnea syndrome    [address], [place] Magdaleno MD    Office Visit    6 months ago Prediabetes    [address], [address], [facility], [facility], APRN    Office Visit    9 months ago Well woman exam with routine gynecological exam    Parker Dowell MD    Office Visit    9 months ago Positive KIRA (antinuclear antibody)    Ginette Fonseca, Ronal Forrest MD    Office Visit    9 months ago Elevated liver enzymes    [address], [place] Magdaleno MD    Office Visit          Future Appointments         Provider Department Appt Notes    In 2 months Jose Daniel Lynne MD [address], [address] [place] , [place]                   Recent Outpatient Visits              2 months ago Obstructive sleep apnea syndrome    [address], [place] Magdaleno MD    Office Visit    6 months ago Prediabetes    [address], [address], [facility], [facility], APRN    Office Visit    9 months ago Well woman exam with routine gynecological exam    Parker Dowell MD    Office Visit    9 months ago Positive KIRA (antinuclear antibody)    [facility] Shalini Cheema MD    Office Visit    9 months ago Elevated liver enzymes    [address], Sean Pinon MD    Office Visit          Future Appointments         Provider Department Appt Notes    In 2 months Zuleyma Luna MD [address], [facility] PAUL Campbell

## 2023-08-25 ENCOUNTER — NURSE TRIAGE (OUTPATIENT)
Facility: CLINIC | Age: 73
End: 2023-08-25

## 2023-08-25 RX ORDER — BETAMETHASONE DIPROPIONATE 0.5 MG/G
30 CREAM TOPICAL 2 TIMES DAILY
Qty: 30 G | Refills: 2 | Status: SHIPPED | OUTPATIENT
Start: 2023-08-25

## 2023-08-25 NOTE — TELEPHONE ENCOUNTER
Daughter called requesting refill for the tramadol and ibuprofen that patient received at discharge from the hosptial. He followed up with Dr. Cha on 07/29/2019   Please have patient take a picture of the rash and if possible put through MyChart. After that I would recommend if she wants to try the cream twice a day light layer on the rash and a centimeter beyond the rash also for 4 weeks. If she is not better in 2 weeks any to see her back in.

## 2023-08-25 NOTE — TELEPHONE ENCOUNTER
Action Requested: Summary for Provider     []  Critical Lab, Recommendations Needed  [x] Need Additional Advice  []   FYI    []   Need Orders  [x] Need Medications Sent to Pharmacy  []  Other     SUMMARY: Per protocol, patient should be seen in office today. No openings. Patient requesting medication. Reason for call: Rash  Onset: Data Unavailable    Language line Janiya ID # [id number], Identified patient's name and . Patient has a recurring rash on her chest and neck. It is very itchy. When she had this before, Dr. Shemar Hernandes prescribed a cream that helped her very well. She is asking for an appointment with only Dr. Shemar Hernandes or this cream. Dr. Shemar Hernandes, please advise on pended medication. Appointment notes from 10/28/23:  20. Rash and nonspecific skin eruption  Other orders  -     Celecoxib; Take 1 capsule (100 mg total) by mouth in the morning and 1 capsule (100 mg total) before bedtime. Dispense: 30 capsule; Refill: 0  -     Betamethasone Dipropionate Aug; Apply 30 g topically 2 (two) times daily.   Dispense: 30 g; Refill: 2      Reason for Disposition   Patient wants to be seen    Protocols used: Rash or Redness - Oalebxiep-B-AT

## 2023-08-26 NOTE — TELEPHONE ENCOUNTER
With  ID #[id number]    Patient was advised patient of 's notes and verbalized understanding. Patient will try to have one of her children help her with sending a picture on [employer].

## 2023-08-28 ENCOUNTER — TELEPHONE (OUTPATIENT)
Dept: INTERNAL MEDICINE CLINIC | Facility: CLINIC | Age: 73
End: 2023-08-28

## 2023-10-30 RX ORDER — AMITRIPTYLINE HYDROCHLORIDE 100 MG/1
100 TABLET ORAL NIGHTLY
Qty: 90 TABLET | Refills: 1 | Status: SHIPPED | OUTPATIENT
Start: 2023-10-30

## 2023-10-30 NOTE — TELEPHONE ENCOUNTER
Please review. Protocol failed / Has no protocol. Requested Prescriptions   Pending Prescriptions Disp Refills    AMITRIPTYLINE  MG Oral Tab [Pharmacy Med Name: Amitriptyline Hydrochloride 100 Mg Tab Nort] 90 tablet 0     Sig: Take 1 tablet (100 mg total) by mouth nightly.        There is no refill protocol information for this order        Future Appointments         Provider Department Appt Notes    In 2 weeks Angelia Bledsoe MD [address], [facility]            Recent Outpatient Visits              5 months ago Obstructive sleep apnea syndrome    [address], Gray Belle MD    Office Visit    9 months ago Prediabetes    [address] [place] [address] Mohan Michaels, KELVIN    Office Visit    11 months ago Well woman exam with routine gynecological exam    Shannan Luna MD    Office Visit    12 months ago Positive KIRA (antinuclear antibody)    [address] [place] [address] Jackie Howe MD    Office Visit    1 year ago Elevated liver enzymes    [address], [address] [place] [address] , Jose Alfredo Cross MD    Office Visit

## 2023-11-09 ENCOUNTER — OFFICE VISIT (OUTPATIENT)
Dept: INTERNAL MEDICINE CLINIC | Facility: CLINIC | Age: 73
End: 2023-11-09

## 2023-11-09 ENCOUNTER — TELEPHONE (OUTPATIENT)
Dept: INTERNAL MEDICINE CLINIC | Facility: CLINIC | Age: 73
End: 2023-11-09

## 2023-11-09 DIAGNOSIS — R05.1 ACUTE COUGH: ICD-10-CM

## 2023-11-09 DIAGNOSIS — R74.8 ELEVATED LIVER ENZYMES: Primary | ICD-10-CM

## 2023-11-09 DIAGNOSIS — E78.00 HIGH CHOLESTEROL: ICD-10-CM

## 2023-11-09 DIAGNOSIS — E55.9 VITAMIN D DEFICIENCY, UNSPECIFIED: ICD-10-CM

## 2023-11-09 DIAGNOSIS — G47.33 OBSTRUCTIVE SLEEP APNEA SYNDROME: ICD-10-CM

## 2023-11-09 PROCEDURE — 99213 OFFICE O/P EST LOW 20 MIN: CPT | Performed by: INTERNAL MEDICINE

## 2023-11-09 RX ORDER — MONTELUKAST SODIUM 10 MG/1
10 TABLET ORAL NIGHTLY
Qty: 7 TABLET | Refills: 0 | Status: SHIPPED | OUTPATIENT
Start: 2023-11-09 | End: 2023-11-16

## 2023-11-09 NOTE — PROGRESS NOTES
Virtual Telephone Check-In    Tahmina Rodriguez verbally consents to a Virtual Telephone Check-In visit on 11/09/23. Patient understands and accepts financial responsibility for any deductible, co-insurance and/or co-pays associated with this service. Dr. Becca Garrison location is at [address]. The patient[place] location [place] at [place] in [place] and their identity has been established. The patient understands that we are limiting office visits due to the COVID-19 pandemic and was informed that consent to treat includes permission to submit charges to insurance. It was also shared that without being seen and evaluated in person, there is a risk that the information and/or assessment may be incomplete or inaccurate. Duration of the service: 1 : 30 PM -  1 : 55  PM.     Past Medical History:   Diagnosis Date    Acid reflux disease     Breast mass in female     Difficulty sleeping     Elevated liver enzymes 10/2017    Fatty liver disease, nonalcoholic 52/9974    Pneumonia due to organism 2016    Postmenopausal bleeding 11/30/2017    Prediabetes     Submucous uterine fibroid 12/4/2017    Vaccine counseling 9/28/2020    Visual impairment     glasses     Allergies   Allergen Reactions    Corn OTHER (SEE COMMENTS)     Allergy Test Screening    Dust Mite Mixed Allergen Ext [Mite (D. Farinae)] OTHER (SEE COMMENTS)     Allergy Test Screening    Fish OTHER (SEE COMMENTS)     Allergy Test Screening    Fish-Derived Products OTHER (SEE COMMENTS)     Allergy Test Screening    Other OTHER (SEE COMMENTS)     Allergy Test Screening - Cockroach    Shrimp OTHER (SEE COMMENTS)     Allergy Test Screening       Current Outpatient Medications:     Amitriptyline HCl 100 MG Oral Tab, Take 1 tablet (100 mg total) by mouth nightly., Disp: 90 tablet, Rfl: 1    Betamethasone Dipropionate Aug 0.05 % External Cream, Apply 30 g topically 2 (two) times daily. , Disp: 30 g, Rfl: 2    pantoprazole 40 MG Oral Tab EC, Take 1 tablet (40 mg total) by mouth before breakfast., Disp: 90 tablet, Rfl: 3    clotrimazole-betamethasone 1-0.05 % External Cream, APPLY 1 APPLICATION TOPICALLY EVERY 12 HOURS, Disp: 60 g, Rfl: 0    celecoxib (CELEBREX) 100 MG Oral Cap, Take 1 capsule (100 mg total) by mouth 2 (two) times daily. , Disp: 60 capsule, Rfl: 1    ketoconazole 2 % External Shampoo, Apply 1 mL topically twice a week., Disp: 120 mL, Rfl: 0    hydrocortisone (PROCTO-MED HC) 2.5 % External Cream, Place 1 Application. rectally 2 (two) times daily. , Disp: 28 g, Rfl: 3    methylPREDNISolone 4 MG Oral Tablet Therapy Pack, Take as directed on package. , Disp: 1 each, Rfl: 0    betamethasone dipropionate 0.05 % External Cream, Apply 1 Application topically 2 (two) times daily. APPLY TO AFFECTED AREA, Disp: , Rfl:     gabapentin 300 MG Oral Cap, Take 1 capsule (300 mg total) by mouth nightly., Disp: 30 capsule, Rfl: 1    cyclobenzaprine 5 MG Oral Tab, Take 1 tablet (5 mg) by mouth nightly as needed for muscles spasms, Disp: , Rfl:     methylPREDNISolone (MEDROL) 4 MG Oral Tablet Therapy Pack, As directed., Disp: 1 each, Rfl: 0    methylPREDNISolone (MEDROL) 4 MG Oral Tablet Therapy Pack, As directed., Disp: 1 each, Rfl: 0    clobetasol 0.05 % External Cream, Apply 1 Application topically 2 (two) times daily. , Disp: 45 g, Rfl: 0    ALREX 0.2 % Ophthalmic Suspension, , Disp: , Rfl:     Flunisolide 25 MCG/ACT (0.025%) Nasal Solution, 2 sprays by Each Nare route 2 (two) times daily. , Disp: 1 Inhaler, Rfl: 1    Summary of topics discussed: I, Dr. Jody James, spoke with pt. Began not feeling good 2 days ago. The, worse so checked home testing today and covid +. Denies F,C,N,V. Denies chest pain except spasmms of coughing. Dry coughing persistent. Denies wheezing nor SOB.  that lives alone. When found out told children not not come by. Cooks herself. Had paxlovid and worked well and wants a Rx. +Fatigue.      Review of Systems:    General: chills/fever, night sweats, weight loss, loss of appetite  Neurological: Denies frequent headaches  Cardiovascular: Denies leg swelling, chest pain or pressure after eating or when upset, irregular heart rate/palpitations, dizziness, N,V, exertional arm pain nor neck pain  Patient is alert and oriented x3. Able to speak in full sentence without short of breath. Alert and oriented on phone, no SOB or wheezing, not anxious  & MS sharp. Diagnoses and all orders for this visit:    Elevated liver enzymes  -     Comp Metabolic Panel (14); Future    High cholesterol  -     Lipid Panel; Future  -     Comp Metabolic Panel (14); Future    Obstructive sleep apnea syndrome    Vitamin D deficiency, unspecified   -     Vitamin D; Future    Acute cough Covid +. Will Rx Paxlovid. Discussed about side effects and use. Increase fluids at least 48 ounces of water a day. Avoid NSAIDs i.e. ibuprofen Advil Aleve Motrin etc.  Can do Tylenol as needed for fever. No more than 3000 mg in 24 hours. We will also try Singulair 10 mg at night to help with cough. Discussed about side effects and use. Other orders  -     nirmatrelvir-ritonavir 300-100 MG Oral Tablet Therapy Pack; Take two nirmatrelvir tablets (300mg) with one ritonavir tablet (100mg) together twice daily for 5 days. -     montelukast (SINGULAIR) 10 MG Oral Tab; Take 1 tablet (10 mg total) by mouth nightly for 7 days. Pt. aware to schedule follow up with OV and doing this telephone visit now due to current covid situation. Please note that the following visit was completed using a 2 way real-time interactive audio and video communication. This has been done in good florecita to provide continuity of care in the best interest of the patient provider relationship, due to ongoing public health crisis, National emergency and because of restrictions of visitation. There are limitations visit as no physical exam could be performed.   Every conscious effort was taken to allow for sufficient and adequate time.  This billing was spent on reviewing labs, medications, radiological test and decision making. Appropriate medical decision making and tests are ordered as detailed in the plan of care above. Triny Long.  Xenia Oliver MD

## 2023-11-17 ENCOUNTER — HOSPITAL ENCOUNTER (OUTPATIENT)
Dept: GENERAL RADIOLOGY | Age: 73
Discharge: HOME | End: 2023-11-17
Attending: INTERNAL MEDICINE
Payer: MEDICARE

## 2023-11-17 ENCOUNTER — OFFICE VISIT (OUTPATIENT)
Dept: INTERNAL MEDICINE CLINIC | Facility: CLINIC | Age: 73
End: 2023-11-17

## 2023-11-17 VITALS
TEMPERATURE: 97 F | BODY MASS INDEX: 36.68 KG/M2 | RESPIRATION RATE: 16 BRPM | DIASTOLIC BLOOD PRESSURE: 59 MMHG | HEIGHT: 63 IN | WEIGHT: 207 LBS | SYSTOLIC BLOOD PRESSURE: 132 MMHG | HEART RATE: 68 BPM

## 2023-11-17 DIAGNOSIS — R73.03 PREDIABETES: ICD-10-CM

## 2023-11-17 DIAGNOSIS — M25.50 POLYARTHRALGIA: ICD-10-CM

## 2023-11-17 DIAGNOSIS — F99 INSOMNIA DUE TO OTHER MENTAL DISORDER: ICD-10-CM

## 2023-11-17 DIAGNOSIS — F51.05 INSOMNIA DUE TO OTHER MENTAL DISORDER: ICD-10-CM

## 2023-11-17 DIAGNOSIS — M19.90 ARTHRITIS: ICD-10-CM

## 2023-11-17 DIAGNOSIS — R05.2 SUBACUTE COUGH: ICD-10-CM

## 2023-11-17 DIAGNOSIS — E55.9 VITAMIN D DEFICIENCY, UNSPECIFIED: ICD-10-CM

## 2023-11-17 DIAGNOSIS — Z12.31 BREAST CANCER SCREENING BY MAMMOGRAM: ICD-10-CM

## 2023-11-17 DIAGNOSIS — M54.12 CERVICAL RADICULOPATHY: ICD-10-CM

## 2023-11-17 DIAGNOSIS — E66.01 MORBID (SEVERE) OBESITY DUE TO EXCESS CALORIES (HCC): ICD-10-CM

## 2023-11-17 DIAGNOSIS — Z00.00 ENCOUNTER FOR ANNUAL HEALTH EXAMINATION: Primary | ICD-10-CM

## 2023-11-17 DIAGNOSIS — E78.00 HIGH CHOLESTEROL: ICD-10-CM

## 2023-11-17 DIAGNOSIS — G47.33 OBSTRUCTIVE SLEEP APNEA SYNDROME: ICD-10-CM

## 2023-11-17 DIAGNOSIS — M47.816 SPONDYLOSIS OF LUMBAR REGION WITHOUT MYELOPATHY OR RADICULOPATHY: ICD-10-CM

## 2023-11-17 DIAGNOSIS — K76.0 FATTY LIVER DISEASE, NONALCOHOLIC: ICD-10-CM

## 2023-11-17 DIAGNOSIS — K21.9 GASTROESOPHAGEAL REFLUX DISEASE WITHOUT ESOPHAGITIS: ICD-10-CM

## 2023-11-17 PROBLEM — S92.515D NONDISPLACED FRACTURE OF PROXIMAL PHALANX OF LESSER TOE OF LEFT FOOT WITH ROUTINE HEALING: Status: RESOLVED | Noted: 2022-03-10 | Resolved: 2023-11-17

## 2023-11-17 LAB
APPEARANCE: CLEAR
BILIRUBIN: NEGATIVE
GLUCOSE (URINE DIPSTICK): NEGATIVE MG/DL
KETONES (URINE DIPSTICK): NEGATIVE MG/DL
LEUKOCYTES: NEGATIVE
MULTISTIX LOT#: NORMAL NUMERIC
NITRITE, URINE: NEGATIVE
OCCULT BLOOD: NEGATIVE
PH, URINE: 7 (ref 4.5–8)
PROTEIN (URINE DIPSTICK): NEGATIVE MG/DL
SPECIFIC GRAVITY: 1.01 (ref 1–1.03)
URINE-COLOR: YELLOW
UROBILINOGEN,SEMI-QN: 0.2 MG/DL (ref 0–1.9)

## 2023-11-17 PROCEDURE — 71046 X-RAY EXAM CHEST 2 VIEWS: CPT | Performed by: INTERNAL MEDICINE

## 2023-11-17 RX ORDER — MONTELUKAST SODIUM 10 MG/1
10 TABLET ORAL NIGHTLY
Qty: 7 TABLET | Refills: 0 | Status: SHIPPED | OUTPATIENT
Start: 2023-11-17 | End: 2023-11-24

## 2023-11-17 RX ORDER — BETAMETHASONE DIPROPIONATE 0.5 MG/G
1 CREAM TOPICAL 2 TIMES DAILY
Qty: 45 G | Refills: 3 | Status: SHIPPED | OUTPATIENT
Start: 2023-11-17

## 2023-11-17 RX ORDER — CELECOXIB 100 MG/1
100 CAPSULE ORAL 2 TIMES DAILY
Qty: 60 CAPSULE | Refills: 1 | Status: SHIPPED | OUTPATIENT
Start: 2023-11-17

## 2023-11-17 RX ORDER — HYDROCORTISONE 25 MG/G
1 CREAM TOPICAL 2 TIMES DAILY
Qty: 28 G | Refills: 3 | Status: SHIPPED | OUTPATIENT
Start: 2023-11-17

## 2023-11-17 RX ORDER — CLOBETASOL PROPIONATE 0.5 MG/G
1 CREAM TOPICAL 2 TIMES DAILY
Qty: 45 G | Refills: 0 | Status: SHIPPED | OUTPATIENT
Start: 2023-11-17

## 2023-11-17 RX ORDER — CLOTRIMAZOLE AND BETAMETHASONE DIPROPIONATE 10; .64 MG/G; MG/G
CREAM TOPICAL
Qty: 60 G | Refills: 0 | Status: SHIPPED | OUTPATIENT
Start: 2023-11-17

## 2023-11-17 RX ORDER — ALBUTEROL SULFATE 90 UG/1
2 AEROSOL, METERED RESPIRATORY (INHALATION) EVERY 4 HOURS PRN
Qty: 1 EACH | Refills: 0 | Status: SHIPPED | OUTPATIENT
Start: 2023-11-17

## 2023-11-17 RX ORDER — MONTELUKAST SODIUM 10 MG/1
10 TABLET ORAL NIGHTLY
COMMUNITY

## 2023-11-18 ENCOUNTER — LAB ENCOUNTER (OUTPATIENT)
Dept: LAB | Facility: HOSPITAL | Age: 73
End: 2023-11-18
Attending: INTERNAL MEDICINE
Payer: MEDICARE

## 2023-11-18 DIAGNOSIS — E55.9 VITAMIN D DEFICIENCY, UNSPECIFIED: ICD-10-CM

## 2023-11-18 DIAGNOSIS — E78.00 HIGH CHOLESTEROL: ICD-10-CM

## 2023-11-18 DIAGNOSIS — R74.8 ELEVATED LIVER ENZYMES: ICD-10-CM

## 2023-11-18 DIAGNOSIS — R70.0 ELEVATED SEDIMENTATION RATE MEASUREMENT: ICD-10-CM

## 2023-11-18 LAB
ALBUMIN SERPL-MCNC: 4.3 G/DL (ref 3.2–4.8)
ALBUMIN/GLOB SERPL: 1.6 {RATIO} (ref 1–2)
ALP LIVER SERPL-CCNC: 93 U/L
ALT SERPL-CCNC: 32 U/L
ANION GAP SERPL CALC-SCNC: 6 MMOL/L (ref 0–18)
AST SERPL-CCNC: 28 U/L (ref ?–34)
BILIRUB SERPL-MCNC: 0.4 MG/DL (ref 0.2–1.1)
BUN BLD-MCNC: 10 MG/DL (ref 9–23)
BUN/CREAT SERPL: 13.9 (ref 10–20)
CALCIUM BLD-MCNC: 9.4 MG/DL (ref 8.7–10.4)
CHLORIDE SERPL-SCNC: 105 MMOL/L (ref 98–112)
CHOLEST SERPL-MCNC: 174 MG/DL (ref ?–200)
CO2 SERPL-SCNC: 31 MMOL/L (ref 21–32)
CREAT BLD-MCNC: 0.72 MG/DL
EGFRCR SERPLBLD CKD-EPI 2021: 89 ML/MIN/1.73M2 (ref 60–?)
ERYTHROCYTE [SEDIMENTATION RATE] IN BLOOD: 32 MM/HR
FASTING PATIENT LIPID ANSWER: YES
FASTING STATUS PATIENT QL REPORTED: YES
GLOBULIN PLAS-MCNC: 2.7 G/DL (ref 2.8–4.4)
GLUCOSE BLD-MCNC: 95 MG/DL (ref 70–99)
HDLC SERPL-MCNC: 48 MG/DL (ref 40–59)
LDLC SERPL CALC-MCNC: 94 MG/DL (ref ?–100)
NONHDLC SERPL-MCNC: 126 MG/DL (ref ?–130)
OSMOLALITY SERPL CALC.SUM OF ELEC: 293 MOSM/KG (ref 275–295)
POTASSIUM SERPL-SCNC: 4.3 MMOL/L (ref 3.5–5.1)
PROT SERPL-MCNC: 7 G/DL (ref 5.7–8.2)
SODIUM SERPL-SCNC: 142 MMOL/L (ref 136–145)
TRIGL SERPL-MCNC: 186 MG/DL (ref 30–149)
VIT D+METAB SERPL-MCNC: 47.5 NG/ML (ref 30–100)
VLDLC SERPL CALC-MCNC: 31 MG/DL (ref 0–30)

## 2023-11-18 PROCEDURE — 80061 LIPID PANEL: CPT

## 2023-11-18 PROCEDURE — 82306 VITAMIN D 25 HYDROXY: CPT

## 2023-11-18 PROCEDURE — 80053 COMPREHEN METABOLIC PANEL: CPT

## 2023-11-18 PROCEDURE — 85652 RBC SED RATE AUTOMATED: CPT

## 2023-11-18 PROCEDURE — 36415 COLL VENOUS BLD VENIPUNCTURE: CPT

## 2023-11-21 NOTE — PROGRESS NOTES
CMP Normal (electrolytes, sugar, kidney and liver functions),   Lipid (choilesterol) shows HDL is slightly low. Goal is above 50. HDL improves with cardiovascular sustained exercise at least 30 minutes 3-4 times a week. Triglycerides are higher. Goal triglycerides is less than 150. Lower carbs helps lower triglycerides. Careful with diet. Avoid red meat, shellfish, pork. Try not to lepe foods. Lower carb diet. Exercise is most part at least 30 minutes 3-4 times a week sustained cardiovascular aerobic exercise getting that heart rate going and keeping it going for 30 minutes at a time. If cannot do 30 will start with 10 minutes of brisk walking is good at each week build up 5 minutes more. Recheck lipid in 3 to 6 months. Orders written. Sed rate which is a nonspecific marker of inflammation is mildly elevated still. Not sure if due to cough. Recheck in 3 months. Orders written. But would also follow-up with rheumatology.

## 2023-12-19 NOTE — TELEPHONE ENCOUNTER
Please review. Protocol failed/No protocol      Requested Prescriptions   Pending Prescriptions Disp Refills    BETAMETHASONE DIPROPIONATE AUG 0.05 % External Lotion [Pharmacy Med Name: Betamethasone Dipropionate Augmented 0.05 % Lot Taro] 60 mL 0     Sig: Apply 1 Application topically 2 (two) times daily.        There is no refill protocol information for this order          Recent Outpatient Visits              1 month ago Encounter for annual health examination    [address], Farrukh Suazo MD    Office Visit    1 month ago Elevated liver enzymes    [address], [address] [facility], Shamar Jones MD    Office Visit    6 months ago Obstructive sleep apnea syndrome    [address], Farrukh Suazo MD    Office Visit    10 months ago Prediabetes    [address] [place], [facility], APRN    Office Visit    1 year ago Well woman exam with routine gynecological exam    Whitley Acevedo MD    Office Visit

## 2023-12-20 ENCOUNTER — TELEPHONE (OUTPATIENT)
Dept: INTERNAL MEDICINE CLINIC | Facility: CLINIC | Age: 73
End: 2023-12-20

## 2023-12-20 DIAGNOSIS — G47.33 OSA (OBSTRUCTIVE SLEEP APNEA): Primary | ICD-10-CM

## 2023-12-20 RX ORDER — BETAMETHASONE DIPROPIONATE 0.5 MG/ML
1 LOTION, AUGMENTED TOPICAL 2 TIMES DAILY
Qty: 60 ML | Refills: 0 | Status: SHIPPED | OUTPATIENT
Start: 2023-12-20

## 2024-01-09 DIAGNOSIS — M19.90 ARTHRITIS: ICD-10-CM

## 2024-01-10 RX ORDER — HYDROCORTISONE 25 MG/G
1 CREAM TOPICAL 2 TIMES DAILY
Qty: 28 G | Refills: 1 | Status: SHIPPED | OUTPATIENT
Start: 2024-01-10

## 2024-01-10 RX ORDER — CELECOXIB 100 MG/1
100 CAPSULE ORAL 2 TIMES DAILY
Qty: 180 CAPSULE | Refills: 1 | Status: SHIPPED | OUTPATIENT
Start: 2024-01-10

## 2024-01-15 NOTE — TELEPHONE ENCOUNTER
DME notes faxed to E    
I did not address with her on the November visit but I did address her in the May visit.  Is made good enough?  
Left message for Johnna at [facility] to check the order for CPAP for patient to see if there is anything else needed  Await return call    
May works.  Can you add something that states that she has been benefiting from CPAP use?   
Patient calling to ask what is the status of her Cpap supplies, please advise   
Please see May office visit note.  
Regarding the fact that was received yesterday,   DME supplier just looking for the \"most recent progress notes indicating patient is using PAP device and that patient is benefiting for use\"   Please advise if you are able to addend your note from LOV 11/17 reflecting the above request.     If you do addend, please route back to me so I can fax over the notes along w/ the order they faxed over.   
Spoke to tomer, need new order stating supplies   Last order was machine and supplies  Settings have not changed since 2018    Pended DME order    
167.64

## 2024-02-08 RX ORDER — CLOTRIMAZOLE AND BETAMETHASONE DIPROPIONATE 10; .64 MG/G; MG/G
CREAM TOPICAL
Qty: 60 G | Refills: 0 | Status: SHIPPED | OUTPATIENT
Start: 2024-02-08

## 2024-02-08 NOTE — TELEPHONE ENCOUNTER
Please review; protocol failed/ has no protocol    Requested Prescriptions   Pending Prescriptions Disp Refills    CLOTRIMAZOLE-BETAMETHASONE 1-0.05 % External Cream [Pharmacy Med Name: Clotrimazole/Betamethasone Dipropionate 1-0.05 % Cre Taro] 60 g 0     Sig: APPLY 1 APPLICATION TOPICALLY EVERY 12 HOURS       There is no refill protocol information for this order        Recent Outpatient Visits              2 months ago Encounter for annual health examination    [facility] [place] [place]    Office Visit    3 months ago Elevated liver enzymes    [facility] Stefani Argueta MD    Office Visit    8 months ago Obstructive sleep apnea syndrome    [facility] [place] [place]    Office Visit    1 year ago Prediabetes    [facility] Christian APRN    Office Visit    1 year ago Well woman exam with routine gynecological exam    [facility] Candido Berry MD    Office Visit

## 2024-02-16 ENCOUNTER — TELEPHONE (OUTPATIENT)
Dept: INTERNAL MEDICINE CLINIC | Facility: CLINIC | Age: 74
End: 2024-02-16

## 2024-02-16 NOTE — TELEPHONE ENCOUNTER
Spoke with [facility] rep, she stated that they do not have any information on the patient and are looking for the sleep study, DME order with machine and supplies.    Did fax to [facility] the following   DME order 03/06/2023   sleep study 09/28/2018   FAX # [phone].

## 2024-02-16 NOTE — TELEPHONE ENCOUNTER
Thank you for sending us notices for all the CPAP supplies and they need office visit notes and they need the sleep study.  We have already faxed over everything to them.  And I know that they have received we have fax confirmation.  This is thinking to go back and forth back-and-forth and has been going on for a while and yet they are not dispensing the supplies to the patient.

## 2024-02-16 NOTE — TELEPHONE ENCOUNTER
Johnna from  states she received several orders for Cpap and supplies.Johnna would like to clarify order and CPAP order is dated from last March 2023. Johnna also needs an order for baseline sleep study. Please advise

## 2024-02-19 NOTE — TELEPHONE ENCOUNTER
Per Johnna @ [phone], they need the base line fax to [phone]. Please they don't need the titration, they need the Base line.

## 2024-03-11 ENCOUNTER — TELEPHONE (OUTPATIENT)
Dept: INTERNAL MEDICINE CLINIC | Facility: CLINIC | Age: 74
End: 2024-03-11

## 2024-03-11 DIAGNOSIS — G47.33 OBSTRUCTIVE SLEEP APNEA: Primary | ICD-10-CM

## 2024-03-11 NOTE — TELEPHONE ENCOUNTER
Patient's son, Tien, was advised the patient qualifies for a new CPAP machine.  Patient is requesting the    RESMED machine    Please advise

## 2024-03-11 NOTE — TELEPHONE ENCOUNTER
See orders written.  They may need a new sleep study?  Since it was done close to 5 years this September.

## 2024-03-11 NOTE — TELEPHONE ENCOUNTER
Patient called to request a new CPAP machine. Patient states she received all the supplies just last week, however, the machine broke down and a technician was called for repair. Technician came out and advised patient to call Dr. Argueta and request the latest CPAP machine as she would qualify for it. Patient expressed frustration because they machine did not last her a week.

## 2024-03-12 ENCOUNTER — NURSE TRIAGE (OUTPATIENT)
Dept: INTERNAL MEDICINE CLINIC | Facility: CLINIC | Age: 74
End: 2024-03-12

## 2024-03-12 LAB
ANION GAP SERPL CALC-SCNC: 4 MMOL/L (ref 0–18)
BASOPHILS # BLD AUTO: 0.08 X10(3) UL (ref 0–0.2)
BASOPHILS NFR BLD AUTO: 0.8 %
BUN BLD-MCNC: 13 MG/DL (ref 9–23)
BUN/CREAT SERPL: 14.8 (ref 10–20)
CALCIUM BLD-MCNC: 9.7 MG/DL (ref 8.7–10.4)
CHLORIDE SERPL-SCNC: 107 MMOL/L (ref 98–112)
CO2 SERPL-SCNC: 26 MMOL/L (ref 21–32)
CREAT BLD-MCNC: 0.88 MG/DL
DEPRECATED RDW RBC AUTO: 42.1 FL (ref 35.1–46.3)
EGFRCR SERPLBLD CKD-EPI 2021: 70 ML/MIN/1.73M2 (ref 60–?)
EOSINOPHIL # BLD AUTO: 0.12 X10(3) UL (ref 0–0.7)
EOSINOPHIL NFR BLD AUTO: 1.2 %
ERYTHROCYTE [DISTWIDTH] IN BLOOD BY AUTOMATED COUNT: 13.9 % (ref 11–15)
GLUCOSE BLD-MCNC: 119 MG/DL (ref 70–99)
HCT VFR BLD AUTO: 42.4 %
HGB BLD-MCNC: 14.3 G/DL
IMM GRANULOCYTES # BLD AUTO: 0.02 X10(3) UL (ref 0–1)
IMM GRANULOCYTES NFR BLD: 0.2 %
LYMPHOCYTES # BLD AUTO: 3.14 X10(3) UL (ref 1–4)
LYMPHOCYTES NFR BLD AUTO: 32.6 %
MCH RBC QN AUTO: 28.1 PG (ref 26–34)
MCHC RBC AUTO-ENTMCNC: 33.7 G/DL (ref 31–37)
MCV RBC AUTO: 83.3 FL
MONOCYTES # BLD AUTO: 0.7 X10(3) UL (ref 0.1–1)
MONOCYTES NFR BLD AUTO: 7.3 %
NEUTROPHILS # BLD AUTO: 5.56 X10 (3) UL (ref 1.5–7.7)
NEUTROPHILS # BLD AUTO: 5.56 X10(3) UL (ref 1.5–7.7)
NEUTROPHILS NFR BLD AUTO: 57.9 %
OSMOLALITY SERPL CALC.SUM OF ELEC: 285 MOSM/KG (ref 275–295)
PLATELET # BLD AUTO: 358 10(3)UL (ref 150–450)
POTASSIUM SERPL-SCNC: 4.1 MMOL/L (ref 3.5–5.1)
RBC # BLD AUTO: 5.09 X10(6)UL
SODIUM SERPL-SCNC: 137 MMOL/L (ref 136–145)
WBC # BLD AUTO: 9.6 X10(3) UL (ref 4–11)

## 2024-03-12 PROCEDURE — 93005 ELECTROCARDIOGRAM TRACING: CPT

## 2024-03-12 PROCEDURE — 36415 COLL VENOUS BLD VENIPUNCTURE: CPT

## 2024-03-12 PROCEDURE — 93010 ELECTROCARDIOGRAM REPORT: CPT

## 2024-03-12 PROCEDURE — 99285 EMERGENCY DEPT VISIT HI MDM: CPT

## 2024-03-12 PROCEDURE — 85025 COMPLETE CBC W/AUTO DIFF WBC: CPT

## 2024-03-12 PROCEDURE — 80048 BASIC METABOLIC PNL TOTAL CA: CPT

## 2024-03-12 PROCEDURE — 99284 EMERGENCY DEPT VISIT MOD MDM: CPT

## 2024-03-12 PROCEDURE — 85025 COMPLETE CBC W/AUTO DIFF WBC: CPT | Performed by: EMERGENCY MEDICINE

## 2024-03-12 PROCEDURE — 84484 ASSAY OF TROPONIN QUANT: CPT | Performed by: EMERGENCY MEDICINE

## 2024-03-12 PROCEDURE — 80048 BASIC METABOLIC PNL TOTAL CA: CPT | Performed by: EMERGENCY MEDICINE

## 2024-03-12 NOTE — TELEPHONE ENCOUNTER
Action Requested: Summary for Provider     []  Critical Lab, Recommendations Needed  [] Need Additional Advice  [x]   FYI    []   Need Orders  [] Need Medications Sent to Pharmacy  []  Other     SUMMARY: Per protocol, patient should be seen in the office today. Appointment scheduled.    Future Appointments   Date Time Provider Department Center   3/12/2024  1:45 PM Mohamud Thrasher MD [id number] [place] [address]     Reason for call: Dizziness (/)  Onset: 3/12    Malay Language Line: Masha ID # [id number].    Patient reports that she uses CPAP machine but thinks she did something to the setting to mess it up last week. Son helped her to have it fixed, filter was replaced. She tried using it last night. Woke up early this morning to go to the bathroom. She removed the mask and felt dizzy and fell. She is unsure if if is related to the CPAP machine.    Reports feeling like the room is spinning especially when she bends over. Denies any other complaints at this time. Advised to schedule an appointment for evaluation. Care advice given per protocol. Patient verbalized understanding and agreed with plan of care.     Reason for Disposition   Patient wants to be seen    Protocols used: Dizziness-A-[place]

## 2024-03-13 ENCOUNTER — APPOINTMENT (OUTPATIENT)
Dept: MRI IMAGING | Facility: HOSPITAL | Age: 74
End: 2024-03-13
Attending: EMERGENCY MEDICINE
Payer: MEDICARE

## 2024-03-13 ENCOUNTER — TELEPHONE (OUTPATIENT)
Dept: INTERNAL MEDICINE CLINIC | Facility: CLINIC | Age: 74
End: 2024-03-13

## 2024-03-13 ENCOUNTER — HOSPITAL ENCOUNTER (EMERGENCY)
Facility: HOSPITAL | Age: 74
Discharge: HOME | End: 2024-03-13
Attending: EMERGENCY MEDICINE
Payer: MEDICARE

## 2024-03-13 ENCOUNTER — OFFICE VISIT (OUTPATIENT)
Dept: INTERNAL MEDICINE CLINIC | Facility: CLINIC | Age: 74
End: 2024-03-13

## 2024-03-13 VITALS
WEIGHT: 211 LBS | TEMPERATURE: 98 F | HEART RATE: 57 BPM | BODY MASS INDEX: 37 KG/M2 | OXYGEN SATURATION: 96 % | DIASTOLIC BLOOD PRESSURE: 71 MMHG | RESPIRATION RATE: 16 BRPM | SYSTOLIC BLOOD PRESSURE: 148 MMHG

## 2024-03-13 VITALS
HEART RATE: 70 BPM | HEIGHT: 63 IN | OXYGEN SATURATION: 97 % | BODY MASS INDEX: 37.56 KG/M2 | WEIGHT: 212 LBS | SYSTOLIC BLOOD PRESSURE: 128 MMHG | DIASTOLIC BLOOD PRESSURE: 73 MMHG | TEMPERATURE: 98 F

## 2024-03-13 DIAGNOSIS — R42 VERTIGO: Primary | ICD-10-CM

## 2024-03-13 LAB
ATRIAL RATE: 68 BPM
P AXIS: 33 DEGREES
P-R INTERVAL: 134 MS
Q-T INTERVAL: 432 MS
QRS DURATION: 88 MS
QTC CALCULATION (BEZET): 459 MS
R AXIS: 14 DEGREES
T AXIS: 45 DEGREES
TROPONIN I SERPL HS-MCNC: <3 NG/L
VENTRICULAR RATE: 68 BPM

## 2024-03-13 PROCEDURE — 99213 OFFICE O/P EST LOW 20 MIN: CPT | Performed by: INTERNAL MEDICINE

## 2024-03-13 PROCEDURE — 70551 MRI BRAIN STEM W/O DYE: CPT | Performed by: EMERGENCY MEDICINE

## 2024-03-13 RX ORDER — LORAZEPAM 1 MG/1
1 TABLET ORAL ONCE
Status: COMPLETED | OUTPATIENT
Start: 2024-03-13 | End: 2024-03-13

## 2024-03-13 RX ORDER — MECLIZINE HYDROCHLORIDE 25 MG/1
25 TABLET ORAL 3 TIMES DAILY PRN
Qty: 15 TABLET | Refills: 0 | Status: SHIPPED | OUTPATIENT
Start: 2024-03-13

## 2024-03-13 RX ORDER — MECLIZINE HYDROCHLORIDE 25 MG/1
25 TABLET ORAL ONCE
Status: COMPLETED | OUTPATIENT
Start: 2024-03-13 | End: 2024-03-13

## 2024-03-13 NOTE — TELEPHONE ENCOUNTER
Son called to check on requested ER followup appt. Speaks English/on PELON    Call Toy rather than patient; easier for him since he speaks English, he said    Toy  [phone], do not leave a voice message, he can't access it

## 2024-03-13 NOTE — PROGRESS NOTES
Subjective:     Patient ID: Citlalli Najera is a 73 year old female.    HPI    History/Other: She came in today for follow-up from emergency room.  She went to emergency room yesterday because of dizziness she was feeling dizzy with change of position they did an MRI which was negative they diagnosed her with vertigo and she was discharged home with meclizine.  She states that today she is feeling much better on and off she has some dizziness when she is trying to change position but much better than yesterday  Review of Systems   Constitutional: Negative.    HENT: Negative.     Eyes: Negative.    Respiratory: Negative.     Cardiovascular: Negative.    Gastrointestinal: Negative.    Genitourinary: Negative.    Musculoskeletal: Negative.    Skin: Negative.    Neurological: Negative.    Hematological: Negative.    Psychiatric/Behavioral: Negative.       Current Outpatient Medications   Medication Sig Dispense Refill    meclizine 25 MG Oral Tab Take 1 tablet (25 mg total) by mouth 3 (three) times daily as needed. 15 tablet 0    celecoxib 100 MG Oral Cap Take 1 capsule (100 mg total) by mouth 2 (two) times daily. 180 capsule 1    Betamethasone Dipropionate Aug 0.05 % External Lotion Apply 1 Application  topically 2 (two) times daily. 60 mL 0    montelukast 10 MG Oral Tab Take 1 tablet (10 mg total) by mouth nightly.      albuterol (PROAIR HFA) 108 (90 Base) MCG/ACT Inhalation Aero Soln Inhale 2 puffs into the lungs every 4 (four) hours as needed for Wheezing. 1 each 0    Amitriptyline HCl 100 MG Oral Tab Take 1 tablet (100 mg total) by mouth nightly. 90 tablet 1    ketoconazole 2 % External Shampoo Apply 1 mL topically twice a week. 120 mL 0    gabapentin 300 MG Oral Cap Take 1 capsule (300 mg total) by mouth nightly. 30 capsule 1    ALREX 0.2 % Ophthalmic Suspension       Flunisolide 25 MCG/ACT (0.025%) Nasal Solution 2 sprays by Each Nare route 2 (two) times daily. 1 Inhaler 1    clotrimazole-betamethasone 1-0.05 %  External Cream APPLY 1 APPLICATION TOPICALLY EVERY 12 HOURS 60 g 0    hydrocortisone (PROCTO-MED HC) 2.5 % External Cream Place 1 Application rectally 2 (two) times daily. 28 g 1    betamethasone dipropionate 0.05 % External Cream Apply 1 Application  topically 2 (two) times daily. APPLY TO AFFECTED AREA (Patient not taking: Reported on 3/13/2024) 45 g 3    clobetasol 0.05 % External Cream Apply 1 Application  topically 2 (two) times daily. 45 g 0    Betamethasone Dipropionate Aug 0.05 % External Cream Apply 30 g topically 2 (two) times daily. (Patient not taking: Reported on 3/13/2024) 30 g 2    pantoprazole 40 MG Oral Tab EC Take 1 tablet (40 mg total) by mouth before breakfast. (Patient not taking: Reported on 3/13/2024) 90 tablet 3    cyclobenzaprine 5 MG Oral Tab Take 1 tablet (5 mg) by mouth nightly as needed for muscles spasms       Allergies:  Allergies   Allergen Reactions    Corn OTHER (SEE COMMENTS)     Allergy Test Screening    Dust Mite Mixed Allergen Ext [Mite (D. Farinae)] OTHER (SEE COMMENTS)     Allergy Test Screening    Fish OTHER (SEE COMMENTS)     Allergy Test Screening    Fish-Derived Products OTHER (SEE COMMENTS)     Allergy Test Screening    Other OTHER (SEE COMMENTS)     Allergy Test Screening - Cockroach    Shrimp OTHER (SEE COMMENTS)     Allergy Test Screening       Past Medical History:   Diagnosis Date    Acid reflux disease     Breast mass in female     Difficulty sleeping     Elevated liver enzymes 10/2017    Fatty liver disease, nonalcoholic 11/2017    Pneumonia due to organism 2016    Postmenopausal bleeding 11/30/2017    Prediabetes     Submucous uterine fibroid 12/4/2017    Vaccine counseling 9/28/2020    Visual impairment     glasses      Past Surgical History:   Procedure Laterality Date    BREAST SURGERY Right 2000    Removed Mass from breast two times Benign.     COLONOSCOPY  10/2018    COLONOSCOPY N/A 10/18/2018    Procedure: COLONOSCOPY, POSSIBLE BIOPSY, POSSIBLE POLYPECTOMY  82477;  Surgeon: Willis Nelson MD;  [facility] S[facility] LEFT (CPT=19281)      NEEDLE BIOPSY LEFT        Family History   Problem Relation Age of Onset    Diabetes Mother       Social History:   Social History     Socioeconomic History    Marital status:    Occupational History    Occupation: Retired from [employer].    Tobacco Use    Smoking status: Former     Packs/day: 1.00     Years: 40.00     Additional pack years: 0.00     Total pack years: 40.00     Types: Cigarettes     Quit date:      Years since quittin.2    Smokeless tobacco: Never   Vaping Use    Vaping Use: Never used   Substance and Sexual Activity    Alcohol use: No    Drug use: No   Social History Narrative      5 months ago.         Objective:   Physical Exam  Vitals and nursing note reviewed.   Constitutional:       Appearance: Normal appearance.   HENT:      Head: Normocephalic and atraumatic.   Cardiovascular:      Rate and Rhythm: Normal rate and regular rhythm.      Pulses: Normal pulses.      Heart sounds: Normal heart sounds.   Pulmonary:      Effort: Pulmonary effort is normal.      Breath sounds: Normal breath sounds.   Musculoskeletal:         General: Normal range of motion.      Cervical back: Normal range of motion and neck supple.   Skin:     General: Skin is warm.   Neurological:      Mental Status: She is alert. Mental status is at baseline.   Psychiatric:         Mood and Affect: Mood normal.         Assessment & Plan:   No diagnosis found.  Vertigo-meclizine as needed she does not have any neurologic symptoms times, symptoms improved, if persistent symptoms, any weakness, numbness  go to er  No orders of the defined types were placed in this encounter.      Meds This Visit:  Requested Prescriptions      No prescriptions requested or ordered in this encounter       Imaging & Referrals:  None

## 2024-03-13 NOTE — ED PROVIDER NOTES
Patient Seen in: [facility] Emergency Department      History     Chief Complaint   Patient presents with    Dizziness     Stated Complaint: dizzness, nausea    Subjective:   HPI    Pt is 74 yo F who arrives from home where she lives at home alone for 2 episodes of dizziness. Last night the first one occurred after removing her CPAP and she felt like room was spinning. No falls. Had another episode today at rest. Symptoms resolved after 10 minutes each but no history of vertigo.  No exacerbating factors. No chest pain or vomiting. No ear ringing, sinus congestion or fevers.     Objective:   Past Medical History:   Diagnosis Date    Acid reflux disease     Breast mass in female     Difficulty sleeping     Elevated liver enzymes 10/2017    Fatty liver disease, nonalcoholic 2017    Pneumonia due to organism     Postmenopausal bleeding 2017    Prediabetes     Submucous uterine fibroid 2017    Vaccine counseling 2020    Visual impairment     glasses              Past Surgical History:   Procedure Laterality Date    BREAST SURGERY Right 2000    Removed Mass from breast two times Benign.     COLONOSCOPY  10/2018    COLONOSCOPY N/A 10/18/2018    Procedure: COLONOSCOPY, POSSIBLE BIOPSY, POSSIBLE POLYPECTOMY 78446;  Surgeon: Willis Nelson MD;  [facility] [facility] [place] [facility] LOCALIZATION WIRE 1 SITE LEFT (CPT=19281)      NEEDLE BIOPSY LEFT                  Social History     Socioeconomic History    Marital status:    Occupational History    Occupation: Retired from [employer].    Tobacco Use    Smoking status: Former     Packs/day: 1.00     Years: 40.00     Additional pack years: 0.00     Total pack years: 40.00     Types: Cigarettes     Quit date:      Years since quittin.2    Smokeless tobacco: Never   Vaping Use    Vaping Use: Never used   Substance and Sexual Activity    Alcohol use: No    Drug use: No   Social History Narrative      5  months ago.               Review of Systems    Positive for stated complaint: dizzness, nausea  Other systems are as noted in HPI.  Constitutional and vital signs reviewed.      All other systems reviewed and negative except as noted above.    Physical Exam     ED Triage Vitals [03/12/24 1933]   BP (!) 163/84   Pulse 71   Resp 18   Temp 98.1 °F (36.7 °C)   Temp src Oral   SpO2 96 %   O2 Device None (Room air)       Current:/71   Pulse 57   Temp 98.1 °F (36.7 °C) (Oral)   Resp 16   Wt 95.7 kg   SpO2 96%   BMI 37.38 kg/m²         Physical Exam    GENERAL: No acute distress, awake and alert  HEENT: EOMI, PERRL, no nystagmus. TMs clear  Neck: supple, no meningeal signs  CV: RRR, no murmurs  Resp: CTAB, no wheezes or retractions  Ab: soft, nontender, no distension  Extremities: FROM of all extremities  Neuro: CN intact, normal speech, no pronator drift; 5/5 motor strength in all extremities, no focal deficits  SKIN: warm, dry, no rashes      ED Course     Labs Reviewed   BASIC METABOLIC PANEL (8) - Abnormal; Notable for the following components:       Result Value    Glucose 119 (*)     All other components within normal limits   TROPONIN I HIGH SENSITIVITY - Normal   CBC WITH DIFFERENTIAL WITH PLATELET    Narrative:     The following orders were created for panel order CBC With Differential With Platelet.  Procedure                               Abnormality         Status                     ---------                               -----------         ------                     CBC W/ DIFFERENTIAL[[id number]]                              Final result                 Please view results for these tests on the individual orders.   RAINBOW DRAW LAVENDER   RAINBOW DRAW LIGHT GREEN   RAINBOW DRAW BLUE   RAINBOW DRAW GOLD   CBC W/ DIFFERENTIAL     EKG    Rate, intervals and axes as noted on EKG Report.  Rate: 68  Rhythm: Sinus Rhythm  Reading: normal QT           MDM         Medical Decision Making  Ddx: vertigo,  cerebellar pathology, electrolyte abnormality, arrhythmia    Patient given meclizine as when she did stand up she started to feel dizzy again.  On reassessment, she feels slightly improved.  Offered admission to the hospital as she still gets occasional dizziness with movement but she adamantly declines and would like to try and go home with medication management.  Her son will be at bedside with her and advised urgent follow-up with return precautions.    Amount and/or Complexity of Data Reviewed  External Data Reviewed: radiology and notes.     Details: CT 2018 reviewed  PCP notes of recent reviewed  Labs: ordered.  Radiology: ordered.     Details:   MRI BRAIN WITHOUT CONTRAST    Comparison: CT head from September 7, 2018      IMPRESSION:    Diffusion images are unremarkable.  No evidence for recent ischemic infarction.    No acute intracranial abnormality.    No evidence of intracranial mass or hemorrhage or mass effect.    Involution and chronic small vessel ischemic disease.    Visualized orbits and sinuses are unremarkable.    Report sent at 4:20 AM Eastern time.      Douglas Wen MD      Risk  Prescription drug management.        Disposition and Plan     Clinical Impression:  1. Vertigo         Disposition:  Discharge  3/13/2024  3:38 am    Follow-up:  Stefani Argueta MD  [address] [address]  [phone]    Follow up in 2 day(s)      Velasquez Sigala DO  [address] [facility] [address]  [place] [address]  [phone]    Follow up      We recommend that you schedule follow up care with a primary care provider within the next three months to obtain basic health screening including reassessment of your blood pressure.      Medications Prescribed:  Current Discharge Medication List        START taking these medications    Details   meclizine 25 MG Oral Tab Take 1 tablet (25 mg total) by mouth 3 (three) times daily as needed.  Qty: 15 tablet, Refills: 0

## 2024-03-13 NOTE — TELEPHONE ENCOUNTER
Jona id# [id number]    Pt asking for ER followup ([facility] 3-12-24) with Dr Argueta only for today or tomorrow.    Call pt at: [phone] ok to leave a voice message; declines another provider

## 2024-03-15 NOTE — TELEPHONE ENCOUNTER
"Paged MD r/t to pain management. \"10/10 pain again right leg, shaking in pain, unable to focus on anything else. Oxy is only BID 2.5.  I gave tylenol and heating pad, not touching pain. please advise.\"    Oxy increased and given. Pt 0/10 pain now sleeping comfortably. MRI and CT ordered. MRI checklist done.   " No vm set up

## 2024-03-18 ENCOUNTER — TELEPHONE (OUTPATIENT)
Dept: INTERNAL MEDICINE CLINIC | Facility: CLINIC | Age: 74
End: 2024-03-18

## 2024-03-18 NOTE — TELEPHONE ENCOUNTER
Language #[phone] assisted with the phone number.  Pt states that she was treated in the ER 3/13 for vertigo was given Meclizine. Followed up with Dr. Thrasher as dr. Argueta was not available. States that she is no better.  Still has severe vertigo.  Pt only wants to see Dr. Argueta this time.  Please advise if pt can be added to your schedule.  Pt states, \"It's an emergency.\"

## 2024-03-22 ENCOUNTER — OFFICE VISIT (OUTPATIENT)
Dept: INTERNAL MEDICINE CLINIC | Facility: CLINIC | Age: 74
End: 2024-03-22

## 2024-03-22 ENCOUNTER — TELEPHONE (OUTPATIENT)
Dept: INTERNAL MEDICINE CLINIC | Facility: CLINIC | Age: 74
End: 2024-03-22

## 2024-03-22 VITALS
WEIGHT: 211.81 LBS | DIASTOLIC BLOOD PRESSURE: 63 MMHG | TEMPERATURE: 98 F | SYSTOLIC BLOOD PRESSURE: 135 MMHG | HEIGHT: 63 IN | HEART RATE: 77 BPM | OXYGEN SATURATION: 100 % | BODY MASS INDEX: 37.53 KG/M2

## 2024-03-22 DIAGNOSIS — R42 VERTIGO: ICD-10-CM

## 2024-03-22 DIAGNOSIS — E78.00 HIGH CHOLESTEROL: ICD-10-CM

## 2024-03-22 DIAGNOSIS — R42 VERTIGO: Primary | ICD-10-CM

## 2024-03-22 DIAGNOSIS — G47.33 OBSTRUCTIVE SLEEP APNEA SYNDROME: ICD-10-CM

## 2024-03-22 DIAGNOSIS — E55.9 VITAMIN D DEFICIENCY, UNSPECIFIED: Primary | ICD-10-CM

## 2024-03-22 PROCEDURE — 99214 OFFICE O/P EST MOD 30 MIN: CPT | Performed by: INTERNAL MEDICINE

## 2024-03-22 RX ORDER — DIAZEPAM 2 MG/1
2 TABLET ORAL EVERY 8 HOURS PRN
Qty: 30 TABLET | Refills: 0 | Status: SHIPPED | OUTPATIENT
Start: 2024-03-22

## 2024-03-22 NOTE — PATIENT INSTRUCTIONS
ASSESSMENT/PLAN:     Encounter Diagnoses   Name Primary?    Vitamin D deficiency, unspecified Stable.    Yes    High cholesterol Check blood.        Obstructive sleep apnea syndrome Compliant with use.? Sleep still unrefreshed.        Vertigo This exercise is for dizziness from the left ear and side:  Sit on the edge of your bed. Turn your head 45 degrees to the right.  Quickly lie down on your left side. ...  Quickly move to lie down on the opposite end of your bed. ...  Return slowly to sitting and wait a few minutes.  Reverse these moves for the right ear.  Stop Antivert.  Use Valium as needed.  Discussed about side effects and use.HOME SAFETY CHECKLIST     General Home Safety Tips     Make sure carbon monoxide and smoke detectors and fire extinguishers are available and inspected regularly. Replace batteries twice a year during daylight saving time.   Remove tripping hazards such as throw rugs, extension cords and excessive clutter.   Keep walkways and rooms well lit.   Secure large furniture, such as book shelves, cabinets or large TVs, to prevent tipping.   Ensure chairs have armrests to provide support when going from a sitting to standing position.   Apply stickers to glass doors at eye-level to ensure doors are visible.      Bathroom     Install grab bars for the shower, tub and toilet to provide additional support.   Set the water temperature at 120 degrees Fahrenheit or below to prevent scalding.   Apply textured stickers to slippery surfaces to prevent falls.      Bedroom      Closely monitor the use of an electric blanket, heater or heating pad to prevent burns or other injuries.   Provide seating near the bed to help with dressing.   Ensure closet shelves are at an accessible height so that items are easy to reach, which may prevent the person from climbing shelves or objects falling from overhead.     Garage and Basement     Limit access to large equipment such as lawn mowers, weed trimmers or  snow blowers.   Keep poisonous chemicals, such as gasoline or paint thinner, out of reach.   Lock and properly store ladders when not in use to prevent a tripping or climbing hazard.   Remove access to car keys if the individual living with dementia is no longer driving.   Install a motion sensor on the garage door.    Fernandez stairs with bright tape and ensure railings are sturdy and secure to prevent tripping or falls.          No orders of the defined types were placed in this encounter.      Meds This Visit:  Requested Prescriptions     Signed Prescriptions Disp Refills    diazePAM (VALIUM) 2 MG Oral Tab 30 tablet 0     Sig: Take 1 tablet (2 mg total) by mouth every 8 (eight) hours as needed (vertigo).       Imaging & Referrals:  PHYSICAL THERAPY - INTERNAL      RTC 3 month sfor FU.   RTC after 11-17-24 for physical.

## 2024-03-22 NOTE — PROGRESS NOTES
HPI:    Patient ID: Citlalli Najera is a 73 year old female.    Hypertension  Patient is here for follow up of hypertension. BP at home: not check.   Has been compliant with medications.  Exercise level: somewhat active and has been following low salt diet.  Weight has been stable.  Wt Readings from Last 3 Encounters:   03/22/24 211 lb 12.8 oz (96.1 kg)   03/13/24 212 lb (96.2 kg)   03/12/24 211 lb (95.7 kg)     BP Readings from Last 3 Encounters:   03/22/24 135/63   03/13/24 128/73   03/13/24 148/71     Labs:   Lab Results   Component Value Date/Time     (H) 03/12/2024 10:21 PM     03/12/2024 10:21 PM    K 4.1 03/12/2024 10:21 PM     03/12/2024 10:21 PM    CO2 26.0 03/12/2024 10:21 PM    CREATSERUM 0.88 03/12/2024 10:21 PM    CA 9.7 03/12/2024 10:21 PM    AST 28 11/18/2023 07:29 AM    ALT 32 11/18/2023 07:29 AM    TSH 0.730 09/29/2022 11:40 AM        Lab Results   Component Value Date/Time    CHOLEST 174 11/18/2023 07:29 AM    HDL 48 11/18/2023 07:29 AM    TRIG 186 (H) 11/18/2023 07:29 AM    LDL 94 11/18/2023 07:29 AM    NONHDLC 126 11/18/2023 07:29 AM            Wt Readings from Last 3 Encounters:   03/22/24 211 lb 12.8 oz (96.1 kg)   03/13/24 212 lb (96.2 kg)   03/12/24 211 lb (95.7 kg)     BP Readings from Last 3 Encounters:   03/22/24 135/63   03/13/24 128/73   03/13/24 148/71     Labs:   Lab Results   Component Value Date/Time     (H) 03/12/2024 10:21 PM     03/12/2024 10:21 PM    K 4.1 03/12/2024 10:21 PM     03/12/2024 10:21 PM    CO2 26.0 03/12/2024 10:21 PM    CREATSERUM 0.88 03/12/2024 10:21 PM    CA 9.7 03/12/2024 10:21 PM    AST 28 11/18/2023 07:29 AM    ALT 32 11/18/2023 07:29 AM    TSH 0.730 09/29/2022 11:40 AM        Lab Results   Component Value Date/Time    CHOLEST 174 11/18/2023 07:29 AM    HDL 48 11/18/2023 07:29 AM    TRIG 186 (H) 11/18/2023 07:29 AM    LDL 94 11/18/2023 07:29 AM    NONHDLC 126 11/18/2023 07:29 AM          Dizziness  This is a new problem.  The problem occurs intermittently. The problem has been waxing and waning. Pertinent negatives include no abdominal pain, chest pain, chills, congestion, coughing, diaphoresis, fatigue, fever, headaches, nausea, neck pain, numbness, rash, sore throat or weakness. The symptoms are aggravated by twisting (Standing from sitting.). Treatments tried: meclizine 3-13-24 from ER.         Review of Systems   Constitutional:  Positive for activity change. Negative for appetite change, chills, diaphoresis, fatigue, fever and unexpected weight change.   HENT:  Negative for congestion, dental problem, drooling, ear discharge, ear pain, facial swelling, hearing loss, mouth sores, nosebleeds, postnasal drip, rhinorrhea, sinus pressure, sinus pain, sneezing, sore throat, tinnitus, trouble swallowing and voice change.    Eyes:  Negative for photophobia, pain, discharge, redness, itching and visual disturbance.   Respiratory: Negative.  Negative for apnea, cough, choking, chest tightness, shortness of breath, wheezing and stridor.    Cardiovascular: Negative.  Negative for chest pain, palpitations and leg swelling.   Gastrointestinal:  Negative for abdominal distention, abdominal pain and nausea.   Endocrine: Negative for cold intolerance, heat intolerance, polydipsia, polyphagia and polyuria.   Genitourinary:  Negative for dysuria.   Musculoskeletal:  Negative for neck pain.   Skin:  Negative for rash.   Allergic/Immunologic: Negative for environmental allergies.   Neurological:  Positive for dizziness. Negative for tremors, seizures, syncope, facial asymmetry, speech difficulty, weakness, light-headedness, numbness and headaches.   Hematological:  Negative for adenopathy.   Psychiatric/Behavioral:  Positive for agitation and sleep disturbance (Uses CPAP qhs X 6 hrs. occ. when moves leaks but readjusts. But still awaknes unresreshed.). Negative for behavioral problems, confusion, decreased concentration, dysphoric mood,  hallucinations, self-injury and suicidal ideas. The patient is not nervous/anxious and is not hyperactive.         Sleeps better when takes xanax.    All other systems reviewed and are negative.        Current Outpatient Medications   Medication Sig Dispense Refill    diazePAM (VALIUM) 2 MG Oral Tab Take 1 tablet (2 mg total) by mouth every 8 (eight) hours as needed (vertigo). 30 tablet 0    meclizine 25 MG Oral Tab Take 1 tablet (25 mg total) by mouth 3 (three) times daily as needed. 15 tablet 0    clotrimazole-betamethasone 1-0.05 % External Cream APPLY 1 APPLICATION TOPICALLY EVERY 12 HOURS 60 g 0    celecoxib 100 MG Oral Cap Take 1 capsule (100 mg total) by mouth 2 (two) times daily. 180 capsule 1    hydrocortisone (PROCTO-MED HC) 2.5 % External Cream Place 1 Application rectally 2 (two) times daily. 28 g 1    Betamethasone Dipropionate Aug 0.05 % External Lotion Apply 1 Application  topically 2 (two) times daily. 60 mL 0    clobetasol 0.05 % External Cream Apply 1 Application  topically 2 (two) times daily. 45 g 0    montelukast 10 MG Oral Tab Take 1 tablet (10 mg total) by mouth nightly.      albuterol (PROAIR HFA) 108 (90 Base) MCG/ACT Inhalation Aero Soln Inhale 2 puffs into the lungs every 4 (four) hours as needed for Wheezing. 1 each 0    Amitriptyline HCl 100 MG Oral Tab Take 1 tablet (100 mg total) by mouth nightly. 90 tablet 1    ketoconazole 2 % External Shampoo Apply 1 mL topically twice a week. 120 mL 0    gabapentin 300 MG Oral Cap Take 1 capsule (300 mg total) by mouth nightly. 30 capsule 1    cyclobenzaprine 5 MG Oral Tab Take 1 tablet (5 mg) by mouth nightly as needed for muscles spasms      ALREX 0.2 % Ophthalmic Suspension       Flunisolide 25 MCG/ACT (0.025%) Nasal Solution 2 sprays by Each Nare route 2 (two) times daily. 1 Inhaler 1    betamethasone dipropionate 0.05 % External Cream Apply 1 Application  topically 2 (two) times daily. APPLY TO AFFECTED AREA (Patient not taking: Reported on  3/13/2024) 45 g 3    Betamethasone Dipropionate Aug 0.05 % External Cream Apply 30 g topically 2 (two) times daily. (Patient not taking: Reported on 3/13/2024) 30 g 2    pantoprazole 40 MG Oral Tab EC Take 1 tablet (40 mg total) by mouth before breakfast. (Patient not taking: Reported on 3/13/2024) 90 tablet 3     Allergies:  Allergies   Allergen Reactions    Corn OTHER (SEE COMMENTS)     Allergy Test Screening    Dust Mite Mixed Allergen Ext [Mite (D. Farinae)] OTHER (SEE COMMENTS)     Allergy Test Screening    Fish OTHER (SEE COMMENTS)     Allergy Test Screening    Fish-Derived Products OTHER (SEE COMMENTS)     Allergy Test Screening    Other OTHER (SEE COMMENTS)     Allergy Test Screening - Cockroach    Shrimp OTHER (SEE COMMENTS)     Allergy Test Screening       HISTORY:  Past Medical History:   Diagnosis Date    Acid reflux disease     Breast mass in female     Difficulty sleeping     Elevated liver enzymes 10/2017    Fatty liver disease, nonalcoholic 11/2017    Pneumonia due to organism 2016    Postmenopausal bleeding 11/30/2017    Prediabetes     Submucous uterine fibroid 12/4/2017    Vaccine counseling 9/28/2020    Visual impairment     glasses      Past Surgical History:   Procedure Laterality Date    BREAST SURGERY Right 2000    Removed Mass from breast two times Benign.     COLONOSCOPY  10/2018    COLONOSCOPY N/A 10/18/2018    Procedure: COLONOSCOPY, POSSIBLE BIOPSY, POSSIBLE POLYPECTOMY 00722;  Surgeon: Willis Nelson MD;  [place] [facility] [place] [facility] LOCALIZATION WIRE 1 SITE LEFT (CPT=19281)      NEEDLE BIOPSY LEFT        Family History   Problem Relation Age of Onset    Diabetes Mother       Social History:   Social History     Socioeconomic History    Marital status:    Occupational History    Occupation: Retired from [employer].    Tobacco Use    Smoking status: Former     Packs/day: 1.00     Years: 40.00     Additional pack years: 0.00     Total pack years: 40.00      Types: Cigarettes     Quit date:      Years since quittin.2    Smokeless tobacco: Never   Vaping Use    Vaping Use: Never used   Substance and Sexual Activity    Alcohol use: No    Drug use: No   Social History Narrative      5 months ago.         PHYSICAL EXAM:   /63 (BP Location: Right arm, Patient Position: Sitting, Cuff Size: large)   Pulse 77   Temp 98 °F (36.7 °C) (Oral)   Ht 5' 3\" (1.6 m)   Wt 211 lb 12.8 oz (96.1 kg)   SpO2 100%   BMI 37.52 kg/m²   BP Readings from Last 3 Encounters:   24 135/63   24 128/73   24 148/71     Wt Readings from Last 3 Encounters:   24 211 lb 12.8 oz (96.1 kg)   24 212 lb (96.2 kg)   24 211 lb (95.7 kg)       Physical Exam  Vitals and nursing note reviewed.   Constitutional:       General: She is not in acute distress.     Appearance: Normal appearance. She is well-developed. She is not ill-appearing, toxic-appearing or diaphoretic.      Interventions: She is not intubated.  HENT:      Head:      Jaw: No trismus.      Right Ear: Tympanic membrane, ear canal and external ear normal. No decreased hearing noted. No laceration, drainage, swelling or tenderness. No middle ear effusion. There is no impacted cerumen. No foreign body. No mastoid tenderness. No PE tube. No hemotympanum. Tympanic membrane is not injected, scarred, perforated, erythematous, retracted or bulging. Tympanic membrane has normal mobility.      Left Ear: Tympanic membrane, ear canal and external ear normal. No decreased hearing noted. No laceration, drainage, swelling or tenderness.  No middle ear effusion. There is no impacted cerumen. No foreign body. No mastoid tenderness. No PE tube. No hemotympanum. Tympanic membrane is not injected, scarred, perforated, erythematous, retracted or bulging. Tympanic membrane has normal mobility.      Nose:      Right Sinus: No maxillary sinus tenderness or frontal sinus tenderness.      Left Sinus: No  maxillary sinus tenderness or frontal sinus tenderness.      Mouth/Throat:      Lips: Pink. No lesions.      Mouth: Mucous membranes are moist. Mucous membranes are not pale, not dry and not cyanotic. No injury, lacerations, oral lesions or angioedema.      Dentition: Does not have dentures. No dental tenderness, gingival swelling, dental abscesses or gum lesions.      Tongue: No lesions. Tongue does not deviate from midline.      Palate: No mass and lesions.      Pharynx: Oropharynx is clear. Uvula midline. No pharyngeal swelling, oropharyngeal exudate, posterior oropharyngeal erythema or uvula swelling.      Tonsils: No tonsillar exudate or tonsillar abscesses.   Eyes:      General: Lids are normal. Gaze aligned appropriately. No scleral icterus.        Right eye: No foreign body, discharge or hordeolum.         Left eye: No foreign body, discharge or hordeolum.      Extraocular Movements: Extraocular movements intact.      Right eye: Nystagmus (Horizontal B/L R> L.) present. Normal extraocular motion.      Left eye: Nystagmus present. Normal extraocular motion.      Conjunctiva/sclera:      Right eye: Right conjunctiva is not injected. No chemosis, exudate or hemorrhage.     Left eye: Left conjunctiva is not injected. No chemosis, exudate or hemorrhage.     Pupils: Pupils are equal, round, and reactive to light.   Neck:      Thyroid: No thyroid mass or thyromegaly.      Trachea: Trachea and phonation normal.   Cardiovascular:      Rate and Rhythm: Normal rate and regular rhythm.      Pulses: Normal pulses. No decreased pulses.           Carotid pulses are 2+ on the right side and 2+ on the left side.       Radial pulses are 2+ on the right side and 2+ on the left side.        Dorsalis pedis pulses are 2+ on the right side and 2+ on the left side.        Posterior tibial pulses are 2+ on the right side and 2+ on the left side.      Heart sounds: Normal heart sounds, S1 normal and S2 normal.   Pulmonary:       Effort: Pulmonary effort is normal. No tachypnea, bradypnea, accessory muscle usage, prolonged expiration, respiratory distress or retractions. She is not intubated.      Breath sounds: Normal breath sounds and air entry. No stridor, decreased air movement or transmitted upper airway sounds. No decreased breath sounds, wheezing, rhonchi or rales.   Chest:      Chest wall: No tenderness.   Abdominal:      General: There is no distension.      Palpations: Abdomen is soft.      Tenderness: There is no abdominal tenderness.   Musculoskeletal:      Cervical back: Neck supple.      Right lower leg: No edema.      Left lower leg: No edema.   Lymphadenopathy:      Head:      Right side of head: No submental, submandibular, preauricular, posterior auricular or occipital adenopathy.      Left side of head: No submental, submandibular, preauricular, posterior auricular or occipital adenopathy.      Cervical: No cervical adenopathy.      Right cervical: No superficial, deep or posterior cervical adenopathy.     Left cervical: No superficial, deep or posterior cervical adenopathy.      Upper Body:      Right upper body: No supraclavicular adenopathy.      Left upper body: No supraclavicular adenopathy.   Skin:     General: Skin is warm and dry.   Neurological:      General: No focal deficit present.      Mental Status: She is alert and oriented to person, place, and time.      Cranial Nerves: Cranial nerves 2-12 are intact. No dysarthria or facial asymmetry.      Motor: No tremor or seizure activity.      Coordination: Romberg sign negative. Coordination normal. Finger-Nose-Finger Test normal.      Gait: Gait is intact. Gait and tandem walk normal.   Psychiatric:         Behavior: Behavior normal. Behavior is cooperative.         Thought Content: Thought content normal.              ASSESSMENT/PLAN:     Encounter Diagnoses   Name Primary?    Vitamin D deficiency, unspecified Stable.    Yes    High cholesterol Check blood.         Obstructive sleep apnea syndrome Compliant with use.? Sleep still unrefreshed.        Vertigo This exercise is for dizziness from the left ear and side:  Sit on the edge of your bed. Turn your head 45 degrees to the right.  Quickly lie down on your left side. ...  Quickly move to lie down on the opposite end of your bed. ...  Return slowly to sitting and wait a few minutes.  Reverse these moves for the right ear.  Stop Antivert.  Use Valium as needed.  Discussed about side effects and use.HOME SAFETY CHECKLIST     General Home Safety Tips     Make sure carbon monoxide and smoke detectors and fire extinguishers are available and inspected regularly. Replace batteries twice a year during daylight saving time.   Remove tripping hazards such as throw rugs, extension cords and excessive clutter.   Keep walkways and rooms well lit.   Secure large furniture, such as book shelves, cabinets or large TVs, to prevent tipping.   Ensure chairs have armrests to provide support when going from a sitting to standing position.   Apply stickers to glass doors at eye-level to ensure doors are visible.      Bathroom     Install grab bars for the shower, tub and toilet to provide additional support.   Set the water temperature at 120 degrees Fahrenheit or below to prevent scalding.   Apply textured stickers to slippery surfaces to prevent falls.      Bedroom      Closely monitor the use of an electric blanket, heater or heating pad to prevent burns or other injuries.   Provide seating near the bed to help with dressing.   Ensure closet shelves are at an accessible height so that items are easy to reach, which may prevent the person from climbing shelves or objects falling from overhead.     Garage and Basement     Limit access to large equipment such as lawn mowers, weed trimmers or snow blowers.   Keep poisonous chemicals, such as gasoline or paint thinner, out of reach.   Lock and properly store ladders when not in use to prevent a  tripping or climbing hazard.   Remove access to car keys if the individual living with dementia is no longer driving.   Install a motion sensor on the garage door.    Fernandez stairs with bright tape and ensure railings are sturdy and secure to prevent tripping or falls.          No orders of the defined types were placed in this encounter.      Meds This Visit:  Requested Prescriptions     Signed Prescriptions Disp Refills    diazePAM (VALIUM) 2 MG Oral Tab 30 tablet 0     Sig: Take 1 tablet (2 mg total) by mouth every 8 (eight) hours as needed (vertigo).       Imaging & Referrals:  PHYSICAL THERAPY - INTERNAL      RTC 3 month sfor FU.   RTC after 11-17-24 for physical.

## 2024-03-25 NOTE — TELEPHONE ENCOUNTER
She can see Dr. Torres.  I did talk to her about options she wanted to see physical therapy.  She has an appointment on March 29 physical therapy.

## 2024-03-25 NOTE — TELEPHONE ENCOUNTER
I received a call from Toy  pt son and he stated that pt stated that you were going to sent her to see a ear specialist for her vertigo.Pt will like to know the name of the specialist. Please advise.  Please call son Toy at [phone]

## 2024-03-26 ENCOUNTER — TELEPHONE (OUTPATIENT)
Dept: PHYSICAL THERAPY | Facility: HOSPITAL | Age: 74
End: 2024-03-26

## 2024-03-26 ENCOUNTER — OFFICE VISIT (OUTPATIENT)
Dept: OTOLARYNGOLOGY | Facility: CLINIC | Age: 74
End: 2024-03-26

## 2024-03-26 DIAGNOSIS — R42 DIZZINESS: Primary | ICD-10-CM

## 2024-03-26 PROCEDURE — 92504 EAR MICROSCOPY EXAMINATION: CPT | Performed by: STUDENT IN AN ORGANIZED HEALTH CARE EDUCATION/TRAINING PROGRAM

## 2024-03-26 PROCEDURE — 99203 OFFICE O/P NEW LOW 30 MIN: CPT | Performed by: STUDENT IN AN ORGANIZED HEALTH CARE EDUCATION/TRAINING PROGRAM

## 2024-03-26 NOTE — PROGRESS NOTES
Citlalli Najera is a 73 year old female.   Chief Complaint   Patient presents with    Dizziness     Vertigo symptoms no nausea or vomiting        ASSESSMENT AND PLAN:   1. Dizziness  73-year-old presents with dizziness.  Has been going on for several weeks now.  She describes as the room is spinning and lasts for short while.  The last time this happened was on Friday at her primary care physician's office.  She had presented to the emergency room on March 13 and MRI at that time of her brain was without any acute abnormality.  She denies any hearing difficulties or headaches.  Or vision changes.  She did have elevated blood pressure in the emergency room but states that it was controlled and her primary care physician's office    Exam she did have vertigo with the Mamta HallPike maneuver to the right although no nystagmus    History and exam possibly suspicious for BPPV.  She had a physical therapy order although she states the location for this was too far away from her home.  Instructed them that they could do this here on the first floor at the [facility] taking go downstairs right now to schedule an appointment I also gave them an order for the [facility] vertigo therapy.  If this does not improve we will follow-up on the physical therapist report and consider other etiologies. Consult from Dr Argueta regarding vertigo    - PHYSICAL THERAPY - INTERNAL      The patient indicates understanding of these issues and agrees to the plan.      EXAM:   There were no vitals taken for this visit.    Pertinent exam findings may also be noted above in assessment and plan     System Details   Skin Inspection - Normal.   Constitutional Overall appearance - Normal.   Head/Face Symmetric, TMJ tenderness not present    Eyes EOMI, PERRL   Right ear:  Canal clear, TM intact, no FANTA   Left ear:  Canal clear, TM intact, no FANTA   Nose: Septum midline, inferior turbinates not enlarged, nasal valves without collapse    Oral  cavity/Oropharynx: No lesions or masses on inspection or palpation, tonsils symmetric    Neck: Soft without LAD, thyroid not enlarged  Voice clear/ no stridor   Other:      Scopes and Procedures:             Current Outpatient Medications   Medication Sig Dispense Refill    diazePAM (VALIUM) 2 MG Oral Tab Take 1 tablet (2 mg total) by mouth every 8 (eight) hours as needed (vertigo). 30 tablet 0    meclizine 25 MG Oral Tab Take 1 tablet (25 mg total) by mouth 3 (three) times daily as needed. 15 tablet 0    clotrimazole-betamethasone 1-0.05 % External Cream APPLY 1 APPLICATION TOPICALLY EVERY 12 HOURS 60 g 0    celecoxib 100 MG Oral Cap Take 1 capsule (100 mg total) by mouth 2 (two) times daily. 180 capsule 1    hydrocortisone (PROCTO-MED HC) 2.5 % External Cream Place 1 Application rectally 2 (two) times daily. 28 g 1    Betamethasone Dipropionate Aug 0.05 % External Lotion Apply 1 Application  topically 2 (two) times daily. 60 mL 0    clobetasol 0.05 % External Cream Apply 1 Application  topically 2 (two) times daily. 45 g 0    montelukast 10 MG Oral Tab Take 1 tablet (10 mg total) by mouth nightly.      albuterol (PROAIR HFA) 108 (90 Base) MCG/ACT Inhalation Aero Soln Inhale 2 puffs into the lungs every 4 (four) hours as needed for Wheezing. 1 each 0    Amitriptyline HCl 100 MG Oral Tab Take 1 tablet (100 mg total) by mouth nightly. 90 tablet 1    ketoconazole 2 % External Shampoo Apply 1 mL topically twice a week. 120 mL 0    gabapentin 300 MG Oral Cap Take 1 capsule (300 mg total) by mouth nightly. 30 capsule 1    cyclobenzaprine 5 MG Oral Tab Take 1 tablet (5 mg) by mouth nightly as needed for muscles spasms      ALREX 0.2 % Ophthalmic Suspension       Flunisolide 25 MCG/ACT (0.025%) Nasal Solution 2 sprays by Each Nare route 2 (two) times daily. 1 Inhaler 1    betamethasone dipropionate 0.05 % External Cream Apply 1 Application  topically 2 (two) times daily. APPLY TO AFFECTED AREA (Patient not taking: Reported  on 3/13/2024) 45 g 3    Betamethasone Dipropionate Aug 0.05 % External Cream Apply 30 g topically 2 (two) times daily. (Patient not taking: Reported on 3/13/2024) 30 g 2    pantoprazole 40 MG Oral Tab EC Take 1 tablet (40 mg total) by mouth before breakfast. (Patient not taking: Reported on 3/13/2024) 90 tablet 3      Past Medical History:   Diagnosis Date    Acid reflux disease     Breast mass in female     Difficulty sleeping     Elevated liver enzymes 10/2017    Fatty liver disease, nonalcoholic 2017    Pneumonia due to organism     Postmenopausal bleeding 2017    Prediabetes     Submucous uterine fibroid 2017    Vaccine counseling 2020    Visual impairment     glasses      Social History:  Social History     Socioeconomic History    Marital status:    Occupational History    Occupation: Retired from [employer].    Tobacco Use    Smoking status: Former     Packs/day: 1.00     Years: 40.00     Additional pack years: 0.00     Total pack years: 40.00     Types: Cigarettes     Quit date:      Years since quittin.2    Smokeless tobacco: Never   Vaping Use    Vaping Use: Never used   Substance and Sexual Activity    Alcohol use: No    Drug use: No   Social History Narrative      5 months ago.           Tarik Babin MD  3/26/2024  9:40 AM

## 2024-03-29 ENCOUNTER — OFFICE VISIT (OUTPATIENT)
Dept: PHYSICAL THERAPY | Age: 74
End: 2024-03-29
Attending: INTERNAL MEDICINE
Payer: MEDICARE

## 2024-03-29 DIAGNOSIS — R42 VERTIGO: Primary | ICD-10-CM

## 2024-03-29 PROCEDURE — 97161 PT EVAL LOW COMPLEX 20 MIN: CPT | Performed by: PHYSICAL THERAPIST

## 2024-03-29 PROCEDURE — 95992 CANALITH REPOSITIONING PROC: CPT | Performed by: PHYSICAL THERAPIST

## 2024-03-29 NOTE — PROGRESS NOTES
PHYSICAL THERAPY EVALUATION:   Referring Physician: Dr. Argueta  Diagnosis: Dizziness (R42) R BPPV       Date of Onset: 3 weeks ago Date of Service: 3/29/2024  Visit # 1  Scheduled Visits 8  Insurance Authorized visits 10 Medicare     PATIENT SUMMARY   Citlalli Najera is a 73 year old y/o female who presents to therapy today with reports of dizziness  Language line used for Kosovan  History of current condition: Pt reports that her dizziness started about 3 weeks ago. Pt reports that she felt like the room was spinning and it lasted seconds. Pt reports that she is still getting symptoms but not as much as before. Pt denies nausea or vomiting. Pt reports that she saw the MD and was referred to PT. Pt denies double and blurred vision.    Symptoms with cough/sneeze or loud noise: no  Falls: Yes when she was dizzy and fell to floor  Hx of migraines:  No  Hx of vision issue:  No  Hx of hearing issues:   No    Dizziness: intermittent  Quality: spinning  Aggravates: Supine to/from sit  Relieves: Not moving    Headache: none    Cervical pain:  none        Current functional limitations include getting into/out of bed and bending over  Prior level of function pt was able to get into/out of bed without symptoms .   Pt goals include to get rid of dizziness.  Past medical history was reviewed with Citlalli. Significant findings include  has a past medical history of Acid reflux disease, Breast mass in female, Difficulty sleeping, Elevated liver enzymes (10/2017), Fatty liver disease, nonalcoholic (11/2017), Pneumonia due to organism (2016), Postmenopausal bleeding (11/30/2017), Prediabetes, Submucous uterine fibroid (12/4/2017), Vaccine counseling (9/28/2020), and Visual impairment.         ASSESSMENT:      Pt presents with subjective c/o symptoms of motion sensitivity, vertigo, and unsteadiness limiting getting into/out of bed. Pt has signs and symptoms consistent with BPPV R canalisthesis with delayed onset and 10 seconds  of torsional nystagmus. Pt. also has impaired static/dynamic balance, gait, and functional activities with challenges of head turns limiting community ambulation.  Pt. would benefit from skilled Physical Therapy to address the above impairments to get into/out of bed and walk and turn head without difficulties.      Precautions:  None      OBJECTIVE:   Physical Exam:  Posture/Observation: poor B rounded shoulders forward head                         Cervical spine ROM: Flex 100%, Ext 100%, R %, L % , R %, L %       Coordination Testing:   Finger to Nose: WNL      Oculomotor/Vestibular Exam:  Spontaneous Nystagmus: In light neg  Smooth Pursuit: Negative   Saccades: Negative   Convergence: Negative    Head Thrust: Negative        Positional Testing:(Performed with gogles on)  Erin-Hallpike: R positive with torsional nystagmus x 10 sec, delayed onset, L N/A  Roll Test (HC): N/A      Today's Treatment and Response:   Pt education was provided on exam findings, treatment diagnosis, treatment plan, expectations, and prognosis. Pt was also provided recommendations for BPPV handout in [place]i.       Charges: PT Eval x1(Low), 1 CRP      Total Timed Treatment: 5 min     Total Treatment Time: 35 min          PLAN OF CARE:    1.Pt. to be independent home exercise program, post treatment instructions to allow patients safe return to ADL’s.  2..Pt to be able to get into/out of bed without symptoms.  3. Pt to be able to demo quick head turns from R/L in order to assist with crossing the street.  4. Pt to report ability to bend forward to tie shoe laces without dizziness        Frequency / Duration: Patient will be seen for 1-2 x/week or a total of 10 visits over a 90 day period.  Treatment will include: Home exercise program development and instruction, Patient/family education, Balance training, Canalith Repositioning Maneuver, Eye/head coordination exercises, Sensory organization training, Optokinetic  stimulation, Strengthening, ROM, Exertion training; Gait Training; Manual Therapy;     Education or treatment limitation: None  Rehab Potential: good    Patient was advised of these findings, precautions, and treatment options and has agreed to actively participate in planning and for this course of care.    Thank you for your referral.  If you have any questions, please contact me at Dept: [phone]    Sincerely,  Electronically signed by therapist: Piper Lo PT, DPT, cert MDT      Physician's certification required: Yes  I certify the need for these services furnished under this plan of treatment and while under my care.    X___________________________________________________ Date____________________    Certification From: 3/29/2024  To:6/27/2024      Lao language   [id number] avni

## 2024-04-05 ENCOUNTER — OFFICE VISIT (OUTPATIENT)
Dept: PHYSICAL THERAPY | Age: 74
End: 2024-04-05
Attending: INTERNAL MEDICINE
Payer: MEDICARE

## 2024-04-05 PROCEDURE — 95992 CANALITH REPOSITIONING PROC: CPT | Performed by: PHYSICAL THERAPIST

## 2024-04-05 NOTE — PROGRESS NOTES
Dx: Dizziness (R42) R BPPV             Insurance (Authorized # of Visits):  10 medicare           Authorizing Physician: Dr. Kendall Harvey MD visit: none  Fall Risk: standard         Precautions: n/a         Evaluation Date: 3/24/23  Previous Level of Function: pt was able to get into/out of bed without symptoms .     Subjective: Pt reports that she is better and only has a little dizziness.   Pain 0/10  Objective: silvia hallpike R + with delayed onset and torsional nystagmus x 10 sec      Assessment: Pt continues to demo positive silvia hallpike with decreased reported dizziness.      Goals:   1.Pt. to be independent home exercise program, post treatment instructions to allow patients safe return to ADL’s.  2..Pt to be able to get into/out of bed without symptoms.  3. Pt to be able to demo quick head turns from R/L in order to assist with crossing the street.  4. Pt to report ability to bend forward to tie shoe laces without dizziness      Plan: Cont PT and reassess symptoms on next visit   Date: 4/5/2024  TX#: 2/10 Date:                 TX#: 3/10 Date:                 TX#: 4/10 Date:                 TX#: 5/10 Date:   Tx#: 6/10   CRP Epley R x 2 over 2 pillows  FOTO                                 Charges: 1 CRP       Total Timed Treatment: 0 min  Total Treatment Time: 15 min

## 2024-04-12 ENCOUNTER — OFFICE VISIT (OUTPATIENT)
Dept: PHYSICAL THERAPY | Age: 74
End: 2024-04-12
Attending: INTERNAL MEDICINE
Payer: MEDICARE

## 2024-04-12 PROCEDURE — 95992 CANALITH REPOSITIONING PROC: CPT | Performed by: PHYSICAL THERAPIST

## 2024-04-12 NOTE — PROGRESS NOTES
Dx: Dizziness (R42) R BPPV             Insurance (Authorized # of Visits):  10 medicare           Authorizing Physician: Dr. Kendall Harvey MD visit: none  Fall Risk: standard         Precautions: n/a         Evaluation Date: 3/24/23  Previous Level of Function: pt was able to get into/out of bed without symptoms .     Subjective: Pt reports that  she is much better and only has a little dizziness.   Pain 0/10  Objective: silvia hallpike R + with delayed onset and reported dizziness x 2 sec      Assessment: Pt continues to demo reported positive silvia hallpike with decreased duration of symptoms.     Goals:   1.Pt. to be independent home exercise program, post treatment instructions to allow patients safe return to ADL’s.  2..Pt to be able to get into/out of bed without symptoms.  3. Pt to be able to demo quick head turns from R/L in order to assist with crossing the street.  4. Pt to report ability to bend forward to tie shoe laces without dizziness      Plan: Cont PT and reassess symptoms on next visit   Date: 4/5/2024  TX#: 2/10 Date:  4/12/24               TX#: 3/10 Date:                 TX#: 4/10 Date:                 TX#: 5/10 Date:   Tx#: 6/10   CRP Epley R x 2 over 2 pillows Epley R x 2 over 3 pillows                                  Charges: 1 CRP       Total Timed Treatment: 0 min  Total Treatment Time: 15 min

## 2024-04-19 ENCOUNTER — OFFICE VISIT (OUTPATIENT)
Dept: PHYSICAL THERAPY | Age: 74
End: 2024-04-19
Attending: INTERNAL MEDICINE
Payer: MEDICARE

## 2024-04-19 PROCEDURE — 95992 CANALITH REPOSITIONING PROC: CPT | Performed by: PHYSICAL THERAPIST

## 2024-04-19 NOTE — PROGRESS NOTES
Dx: Dizziness (R42) R BPPV             Insurance (Authorized # of Visits):  10 medicare           Authorizing Physician: Dr. Kendall Harvey MD visit: none  Fall Risk: standard         Precautions: n/a         Evaluation Date: 3/24/23  Previous Level of Function: pt was able to get into/out of bed without symptoms .     Subjective: Pt reports that she is so much better and has very little dizziness.   Pain 0/10  Objective: silvia hallpike R + with delayed onset and reported dizziness x 2 sec      Assessment: Pt continues to demo positive R silvia hallpike. On second epley pt did not experience any symptoms.     Goals:   1.Pt. to be independent home exercise program, post treatment instructions to allow patients safe return to ADL’s.  2..Pt to be able to get into/out of bed without symptoms.  3. Pt to be able to demo quick head turns from R/L in order to assist with crossing the street.  4. Pt to report ability to bend forward to tie shoe laces without dizziness      Plan: Cont PT and reassess symptoms on next visit   Date: 4/5/2024  TX#: 2/10 Date:  4/12/24               TX#: 3/10 Date:  4/19/24               TX#: 4/10 Date:                 TX#: 5/10 Date:   Tx#: 6/10   CRP Epley R x 2 over 2 pillows Epley R x 2 over 3 pillows Epley R x 2 over 2 pillows                                 Charges: 1 CRP       Total Timed Treatment: 0 min  Total Treatment Time: 20 min

## 2024-04-25 ENCOUNTER — OFFICE VISIT (OUTPATIENT)
Dept: PHYSICAL THERAPY | Age: 74
End: 2024-04-25
Attending: INTERNAL MEDICINE
Payer: MEDICARE

## 2024-04-25 PROCEDURE — 95992 CANALITH REPOSITIONING PROC: CPT | Performed by: PHYSICAL THERAPIST

## 2024-04-25 NOTE — PROGRESS NOTES
Ori  Pt has attended 5 visits in Physical Therapy.   Reporting period: 3/24/24 to 4/25/2024      Dx: Dizziness (R42) R BPPV             Insurance (Authorized # of Visits):  10 medicare           Authorizing Physician: Dr. Kendall Harvey MD visit: none  Fall Risk: standard         Precautions: n/a         Evaluation Date: 3/24/23  Previous Level of Function: pt was able to get into/out of bed without symptoms .     Subjective: Pt reports that she has had no dizziness at home and has been doing good.   Pain 0/10  Objective: silvia hallpike R + with delayed onset and reported dizziness x 1 sec      Assessment: Performed epley x1 today secondary to minimal report of dizziness. Pt has experienced no dizziness at home and will be discharged from PT. Pt was educated on what to do if symptoms return.    Goals:   1.Pt. to be independent home exercise program, post treatment instructions to allow patients safe return to ADL’s.-met  2..Pt to be able to get into/out of bed without symptoms.-met  3. Pt to be able to demo quick head turns from R/L in order to assist with crossing the street.-met  4. Pt to report ability to bend forward to tie shoe laces without dizziness-not assessed      Plan: Discharge from PT    Patient was advised of these findings, precautions, and treatment options and has agreed to actively participate in planning and for this course of care.    Thank you for your referral. If you have any questions, please contact me at Dept: [phone].    Sincerely,  Electronically signed by therapist Piper Lo PT, DPT, cert MDT    Physician's certification required:  No             Date: 4/5/2024  TX#: 2/10 Date:  4/12/24               TX#: 3/10 Date:  4/19/24               TX#: 4/10 Date:   4/25/24              TX#: 5/10 Date:   Tx#: 6/10   CRP Epley R x 2 over 2 pillows Epley R x 2 over 3 pillows Epley R x 2 over 2 pillows Epley R x 1 over 2 pillows                                Charges: 1 CRP        Total Timed Treatment: 0 min  Total Treatment Time: 8 min

## 2024-04-30 ENCOUNTER — APPOINTMENT (OUTPATIENT)
Dept: PHYSICAL THERAPY | Age: 74
End: 2024-04-30
Attending: INTERNAL MEDICINE
Payer: MEDICARE

## 2024-05-01 RX ORDER — MONTELUKAST SODIUM 10 MG/1
10 TABLET ORAL NIGHTLY PRN
Qty: 90 TABLET | Refills: 3 | Status: SHIPPED | OUTPATIENT
Start: 2024-05-01

## 2024-05-01 RX ORDER — AMITRIPTYLINE HYDROCHLORIDE 100 MG/1
100 TABLET ORAL NIGHTLY
Qty: 90 TABLET | Refills: 1 | Status: SHIPPED | OUTPATIENT
Start: 2024-05-01

## 2024-05-01 NOTE — TELEPHONE ENCOUNTER
Please review.  Protocol failed / Has no protocol.    Montelukast is listed as patient reported.          MONTELUKAST 10 MG Oral Tab (Discontinued) 90 tablet 3 5/8/2023 5/26/2023   Sig:   Take 1 tablet (10 mg total) by mouth nightly as needed.     Route:   Oral     Reason for Discontinue:   Physician directed            Requested Prescriptions   Pending Prescriptions Disp Refills    AMITRIPTYLINE  MG Oral Tab [Pharmacy Med Name: Amitriptyline Hydrochloride 100 Mg Tab Nort] 90 tablet 0     Sig: Take 1 tablet (100 mg total) by mouth nightly.       There is no refill protocol information for this order       MONTELUKAST 10 MG Oral Tab [Pharmacy Med Name: Montelukast Sodium 10 Mg Tab Auro] 90 tablet 0     Sig: Take 1 tablet (10 mg total) by mouth nightly as needed.       Asthma & COPD Medication Protocol Failed - 4/30/2024 10:43 AM        Failed - Asthma Action Score greater than or equal to 20        Failed - AAP/ACT given in last 12 months     No data recorded  No data recorded  No data recorded  No data recorded          Passed - Appointment in past 6 or next 3 months      Recent Outpatient Visits              6 days ago     [facility] Piper Lo, PT    Office Visit    1 week ago     [facility] Piper Lo, PT    Office Visit    2 weeks ago     [facility] Piper Lo, PT    Office Visit    3 weeks ago     [facility] Piper Lo, PT    Office Visit    1 month ago Vertigo    [facility] Piper Lo, PT    Office Visit          Future Appointments         Provider Department Appt Notes    In 3 weeks Stefani Argueta MD [facility] [place] follow up--patient would not elaborate on her symptoms    In 1 month Stefani Argueta MD  [facility] [place], [place] 3 month follow up    In 7 months Stefani Argueta MD [facility] [place], [place] Annual Physical                       Future Appointments         Provider Department Appt Notes    In 3 weeks Stefani Argueta MD [facility] [place], [place] follow up--patient would not elaborate on her symptoms    In 1 month Stefani Argueta MD [facility] [place], [place] 3 month follow up    In 7 months Stefani Argueta MD [facility] [place], [place] Annual Physical          Recent Outpatient Visits              6 days ago     [facility] Piper Lo, PT    Office Visit    1 week ago     [facility] Piper Lo, PT    Office Visit    2 weeks ago     [facility] Piper Lo, PT    Office Visit    3 weeks ago     [facility] Piper Lo, PT    Office Visit    1 month ago Vertigo    [facility] Piper Lo, PT    Office Visit

## 2024-05-08 RX ORDER — KETOCONAZOLE 20 MG/ML
1 SHAMPOO TOPICAL
Qty: 120 ML | Refills: 1 | Status: SHIPPED | OUTPATIENT
Start: 2024-05-09

## 2024-05-08 NOTE — TELEPHONE ENCOUNTER
Please Review. Protocol Failed; No Protocol     Requested Prescriptions   Pending Prescriptions Disp Refills    KETOCONAZOLE 2 % External Shampoo [Pharmacy Med Name: Ketoconazole 2 % Sha Tolm] 120 mL 0     Sig: Apply 1 mL topically twice a week.       There is no refill protocol information for this order            Future Appointments         Provider Department Appt Notes    In 2 weeks Stefani Argueta MD [facility] [place], [place] follow up--patient would not elaborate on her symptoms    In 1 month Stefani Argueta MD [facility] [place], [place] 3 month follow up    In 6 months Stefani Argueta MD [facility] [place], [place] Annual Physical          Recent Outpatient Visits              1 week ago     [facility] Piper Lo, PT    Office Visit    2 weeks ago     [facility] Piper Lo, PT    Office Visit    3 weeks ago     [facility] Piper Lo, PT    Office Visit    1 month ago     [facility] Piper Lo PT    Office Visit    1 month ago Vertigo    [facility] Piper Lo, PT    Office Visit

## 2024-05-09 ENCOUNTER — APPOINTMENT (OUTPATIENT)
Dept: GENERAL RADIOLOGY | Facility: HOSPITAL | Age: 74
End: 2024-05-09
Attending: EMERGENCY MEDICINE

## 2024-05-09 ENCOUNTER — APPOINTMENT (OUTPATIENT)
Dept: CT IMAGING | Facility: HOSPITAL | Age: 74
End: 2024-05-09
Attending: EMERGENCY MEDICINE

## 2024-05-09 ENCOUNTER — NURSE TRIAGE (OUTPATIENT)
Dept: INTERNAL MEDICINE CLINIC | Facility: CLINIC | Age: 74
End: 2024-05-09

## 2024-05-09 ENCOUNTER — APPOINTMENT (OUTPATIENT)
Dept: CV DIAGNOSTICS | Facility: HOSPITAL | Age: 74
End: 2024-05-09
Attending: INTERNAL MEDICINE

## 2024-05-09 ENCOUNTER — HOSPITAL ENCOUNTER (OUTPATIENT)
Facility: HOSPITAL | Age: 74
Setting detail: OBSERVATION
Discharge: HOME | End: 2024-05-11
Attending: EMERGENCY MEDICINE | Admitting: INTERNAL MEDICINE

## 2024-05-09 DIAGNOSIS — R55 SYNCOPE AND COLLAPSE: Primary | ICD-10-CM

## 2024-05-09 PROBLEM — R73.9 HYPERGLYCEMIA: Status: ACTIVE | Noted: 2024-05-09

## 2024-05-09 LAB
ANION GAP SERPL CALC-SCNC: 10 MMOL/L (ref 0–18)
ATRIAL RATE: 65 BPM
BASOPHILS # BLD AUTO: 0.11 X10(3) UL (ref 0–0.2)
BASOPHILS NFR BLD AUTO: 1.1 %
BUN BLD-MCNC: 13 MG/DL (ref 9–23)
BUN/CREAT SERPL: 18.3 (ref 10–20)
CALCIUM BLD-MCNC: 9.7 MG/DL (ref 8.7–10.4)
CHLORIDE SERPL-SCNC: 108 MMOL/L (ref 98–112)
CHOLEST SERPL-MCNC: 187 MG/DL (ref ?–200)
CO2 SERPL-SCNC: 25 MMOL/L (ref 21–32)
CREAT BLD-MCNC: 0.71 MG/DL
DEPRECATED RDW RBC AUTO: 43.2 FL (ref 35.1–46.3)
EGFRCR SERPLBLD CKD-EPI 2021: 91 ML/MIN/1.73M2 (ref 60–?)
EOSINOPHIL # BLD AUTO: 0.2 X10(3) UL (ref 0–0.7)
EOSINOPHIL NFR BLD AUTO: 1.9 %
ERYTHROCYTE [DISTWIDTH] IN BLOOD BY AUTOMATED COUNT: 14 % (ref 11–15)
GLUCOSE BLD-MCNC: 113 MG/DL (ref 70–99)
HCT VFR BLD AUTO: 43.6 %
HDLC SERPL-MCNC: 60 MG/DL (ref 40–59)
HGB BLD-MCNC: 13.8 G/DL
IMM GRANULOCYTES # BLD AUTO: 0.05 X10(3) UL (ref 0–1)
IMM GRANULOCYTES NFR BLD: 0.5 %
LDLC SERPL CALC-MCNC: 92 MG/DL (ref ?–100)
LYMPHOCYTES # BLD AUTO: 3.04 X10(3) UL (ref 1–4)
LYMPHOCYTES NFR BLD AUTO: 29.5 %
MCH RBC QN AUTO: 26.8 PG (ref 26–34)
MCHC RBC AUTO-ENTMCNC: 31.7 G/DL (ref 31–37)
MCV RBC AUTO: 84.7 FL
MONOCYTES # BLD AUTO: 0.77 X10(3) UL (ref 0.1–1)
MONOCYTES NFR BLD AUTO: 7.5 %
NEUTROPHILS # BLD AUTO: 6.13 X10 (3) UL (ref 1.5–7.7)
NEUTROPHILS # BLD AUTO: 6.13 X10(3) UL (ref 1.5–7.7)
NEUTROPHILS NFR BLD AUTO: 59.5 %
NONHDLC SERPL-MCNC: 127 MG/DL (ref ?–130)
OSMOLALITY SERPL CALC.SUM OF ELEC: 297 MOSM/KG (ref 275–295)
P AXIS: 56 DEGREES
P-R INTERVAL: 136 MS
PLATELET # BLD AUTO: 330 10(3)UL (ref 150–450)
POTASSIUM SERPL-SCNC: 3.8 MMOL/L (ref 3.5–5.1)
Q-T INTERVAL: 410 MS
QRS DURATION: 90 MS
QTC CALCULATION (BEZET): 426 MS
R AXIS: 34 DEGREES
RBC # BLD AUTO: 5.15 X10(6)UL
SODIUM SERPL-SCNC: 143 MMOL/L (ref 136–145)
T AXIS: 61 DEGREES
TRIGL SERPL-MCNC: 208 MG/DL (ref 30–149)
TROPONIN I SERPL HS-MCNC: 3 NG/L
TROPONIN I SERPL HS-MCNC: 4 NG/L
TROPONIN I SERPL HS-MCNC: 5 NG/L
VENTRICULAR RATE: 65 BPM
VLDLC SERPL CALC-MCNC: 34 MG/DL (ref 0–30)
WBC # BLD AUTO: 10.3 X10(3) UL (ref 4–11)

## 2024-05-09 PROCEDURE — 93306 TTE W/DOPPLER COMPLETE: CPT | Performed by: INTERNAL MEDICINE

## 2024-05-09 PROCEDURE — 99232 SBSQ HOSP IP/OBS MODERATE 35: CPT | Performed by: INTERNAL MEDICINE

## 2024-05-09 PROCEDURE — 71045 X-RAY EXAM CHEST 1 VIEW: CPT | Performed by: EMERGENCY MEDICINE

## 2024-05-09 PROCEDURE — 70450 CT HEAD/BRAIN W/O DYE: CPT | Performed by: EMERGENCY MEDICINE

## 2024-05-09 RX ORDER — HYDROCODONE BITARTRATE AND ACETAMINOPHEN 5; 325 MG/1; MG/1
2 TABLET ORAL EVERY 4 HOURS PRN
Status: DISCONTINUED | OUTPATIENT
Start: 2024-05-09 | End: 2024-05-11

## 2024-05-09 RX ORDER — KETOTIFEN FUMARATE 0.35 MG/ML
1 SOLUTION/ DROPS OPHTHALMIC 2 TIMES DAILY
Status: DISCONTINUED | OUTPATIENT
Start: 2024-05-09 | End: 2024-05-11

## 2024-05-09 RX ORDER — AMITRIPTYLINE HYDROCHLORIDE 25 MG/1
100 TABLET, FILM COATED ORAL NIGHTLY
Status: DISCONTINUED | OUTPATIENT
Start: 2024-05-09 | End: 2024-05-11

## 2024-05-09 RX ORDER — BISACODYL 10 MG
10 SUPPOSITORY, RECTAL RECTAL
Status: DISCONTINUED | OUTPATIENT
Start: 2024-05-09 | End: 2024-05-11

## 2024-05-09 RX ORDER — ALBUTEROL SULFATE 90 UG/1
2 AEROSOL, METERED RESPIRATORY (INHALATION) EVERY 4 HOURS PRN
Status: DISCONTINUED | OUTPATIENT
Start: 2024-05-09 | End: 2024-05-11

## 2024-05-09 RX ORDER — METOCLOPRAMIDE HYDROCHLORIDE 5 MG/ML
10 INJECTION INTRAMUSCULAR; INTRAVENOUS EVERY 8 HOURS PRN
Status: DISCONTINUED | OUTPATIENT
Start: 2024-05-09 | End: 2024-05-11

## 2024-05-09 RX ORDER — NITROGLYCERIN 0.4 MG/1
0.4 TABLET SUBLINGUAL EVERY 5 MIN PRN
Status: DISCONTINUED | OUTPATIENT
Start: 2024-05-09 | End: 2024-05-11

## 2024-05-09 RX ORDER — ONDANSETRON 2 MG/ML
4 INJECTION INTRAMUSCULAR; INTRAVENOUS EVERY 6 HOURS PRN
Status: DISCONTINUED | OUTPATIENT
Start: 2024-05-09 | End: 2024-05-11

## 2024-05-09 RX ORDER — ACETAMINOPHEN 325 MG/1
650 TABLET ORAL EVERY 4 HOURS PRN
Status: DISCONTINUED | OUTPATIENT
Start: 2024-05-09 | End: 2024-05-11

## 2024-05-09 RX ORDER — ENEMA 19; 7 G/133ML; G/133ML
1 ENEMA RECTAL ONCE AS NEEDED
Status: DISCONTINUED | OUTPATIENT
Start: 2024-05-09 | End: 2024-05-11

## 2024-05-09 RX ORDER — GABAPENTIN 300 MG/1
300 CAPSULE ORAL NIGHTLY
Status: DISCONTINUED | OUTPATIENT
Start: 2024-05-09 | End: 2024-05-09

## 2024-05-09 RX ORDER — POLYETHYLENE GLYCOL 3350 17 G/17G
17 POWDER, FOR SOLUTION ORAL DAILY PRN
Status: DISCONTINUED | OUTPATIENT
Start: 2024-05-09 | End: 2024-05-11

## 2024-05-09 RX ORDER — SENNOSIDES 8.6 MG
17.2 TABLET ORAL NIGHTLY PRN
Status: DISCONTINUED | OUTPATIENT
Start: 2024-05-09 | End: 2024-05-11

## 2024-05-09 RX ORDER — HYDROCODONE BITARTRATE AND ACETAMINOPHEN 5; 325 MG/1; MG/1
1 TABLET ORAL EVERY 4 HOURS PRN
Status: DISCONTINUED | OUTPATIENT
Start: 2024-05-09 | End: 2024-05-11

## 2024-05-09 RX ORDER — MELATONIN
3 NIGHTLY PRN
Status: DISCONTINUED | OUTPATIENT
Start: 2024-05-09 | End: 2024-05-11

## 2024-05-09 NOTE — H&P
[facility]  part of [facility]    History and Physical    Citlalli Najera Patient Status:  Observation    1950 MRN [id number]   Location [facility] 3W/SW Attending Stefani Argueta MD   Hosp Day # 0 PCP Stefani Argueta MD     Date:  2024  Date of Admission:  2024    History provided by:patient  HPI:     Chief Complaint   Patient presents with    Syncope     Workup this morning at 8 AM and after getting out of bed had a syncopal episode no prodromal symptoms per patient.  No chest pain no palpitations no feeling of dizziness no nausea no vomiting no double vision blurry vision no headache no numbness tingling no significant pain.  Does not know how long she was out.  Unwitnessed event.  She woke up between the dresser and her bed.  Then she ate breakfast like she normally is after preparing it.  She dropped off her dogs at the [employer] for dog grooming.  On the way back try she felt vertigo symptoms are typical vertigo.  Lasted for few minutes and went away.  After 1 hour she went back to  her dogs from the dog only place.  In the place when she went to pay money she had another syncopal episode.  This was witnessed by  the .  Denies any bowel or bladder incontinence.  Denies any shaking episodes.  Denies any postictal phase.  Denies any tongue biting the first time.  No previous history of seizures.  Troponin 5     CT head-no acute findings  CXR-no acute findings  ECG-sinus rhythm, 65 bpm          History     Past Medical History:    Acid reflux disease    Breast mass in female    Difficulty sleeping    Elevated liver enzymes    Fatty liver disease, nonalcoholic    Pneumonia due to organism    Postmenopausal bleeding    Prediabetes    Submucous uterine fibroid    Vaccine counseling    Visual impairment    glasses     Past Surgical History:   Procedure Laterality Date    Breast surgery Right     Removed Mass from breast two times Benign.     Colonoscopy   10/2018    Colonoscopy N/A 10/18/2018    Procedure: COLONOSCOPY, POSSIBLE BIOPSY, POSSIBLE POLYPECTOMY 76281;  Surgeon: Willis Nelson MD;  Location: [facility] localization wire 1 site left (cpt=19281)      Needle biopsy left       Family History   Problem Relation Age of Onset    Diabetes Mother      Social History:  Social History     Socioeconomic History    Marital status:    Occupational History    Occupation: Retired from [employer].    Tobacco Use    Smoking status: Former     Current packs/day: 0.00     Average packs/day: 1 pack/day for 40.0 years (40.0 ttl pk-yrs)     Types: Cigarettes     Start date:      Quit date:      Years since quittin.3    Smokeless tobacco: Never   Vaping Use    Vaping status: Never Used   Substance and Sexual Activity    Alcohol use: No    Drug use: No   Social History Narrative      5 months ago.      Allergies/Medications:   Allergies:   Allergies   Allergen Reactions    Corn OTHER (SEE COMMENTS)     Allergy Test Screening    Dust Mite Mixed Allergen Ext [Mite (D. Farinae)] OTHER (SEE COMMENTS)     Allergy Test Screening    Fish OTHER (SEE COMMENTS)     Allergy Test Screening    Fish-Derived Products OTHER (SEE COMMENTS)     Allergy Test Screening    Other OTHER (SEE COMMENTS)     Allergy Test Screening - Cockroach    Shrimp OTHER (SEE COMMENTS)     Allergy Test Screening     Medications Prior to Admission   Medication Sig    Amitriptyline HCl 100 MG Oral Tab Take 1 tablet (100 mg total) by mouth nightly.    montelukast 10 MG Oral Tab Take 1 tablet (10 mg total) by mouth nightly as needed. (Patient taking differently: Take 1 tablet (10 mg total) by mouth nightly as needed. 24 last picked up)    diazePAM (VALIUM) 2 MG Oral Tab Take 1 tablet (2 mg total) by mouth every 8 (eight) hours as needed (vertigo). (Patient taking differently: Take 1 tablet (2 mg total) by mouth every 8 (eight) hours as needed (vertigo).  3/22/2024 last picked up)    meclizine 25 MG Oral Tab Take 1 tablet (25 mg total) by mouth 3 (three) times daily as needed.    celecoxib 100 MG Oral Cap Take 1 capsule (100 mg total) by mouth 2 (two) times daily. (Patient taking differently: Take 1 capsule (100 mg total) by mouth 2 (two) times daily. Hasn't got prescription since 12/13/23)    hydrocortisone (PROCTO-MED HC) 2.5 % External Cream Place 1 Application rectally 2 (two) times daily.    albuterol (PROAIR HFA) 108 (90 Base) MCG/ACT Inhalation Aero Soln Inhale 2 puffs into the lungs every 4 (four) hours as needed for Wheezing. (Patient taking differently: Inhale 2 puffs into the lungs every 4 (four) hours as needed for Wheezing. 11/17/23)    pantoprazole 40 MG Oral Tab EC Take 1 tablet (40 mg total) by mouth before breakfast.    gabapentin 300 MG Oral Cap Take 1 capsule (300 mg total) by mouth nightly.    ALREX 0.2 % Ophthalmic Suspension     ketoconazole 2 % External Shampoo Apply 1 mL topically twice a week.    clotrimazole-betamethasone 1-0.05 % External Cream APPLY 1 APPLICATION TOPICALLY EVERY 12 HOURS    Betamethasone Dipropionate Aug 0.05 % External Lotion Apply 1 Application  topically 2 (two) times daily.    betamethasone dipropionate 0.05 % External Cream Apply 1 Application  topically 2 (two) times daily. APPLY TO AFFECTED AREA (Patient not taking: Reported on 3/13/2024)    clobetasol 0.05 % External Cream Apply 1 Application  topically 2 (two) times daily.    Betamethasone Dipropionate Aug 0.05 % External Cream Apply 30 g topically 2 (two) times daily. (Patient not taking: Reported on 3/13/2024)    cyclobenzaprine 5 MG Oral Tab Take 1 tablet (5 mg) by mouth nightly as needed for muscles spasms    Flunisolide 25 MCG/ACT (0.025%) Nasal Solution 2 sprays by Each Nare route 2 (two) times daily.       Review of Systems:     Constitutional:  Negative for activity change, appetite change, chills, diaphoresis, fatigue, fever and unexpected weight change.    HENT:  Negative for congestion, dental problem, drooling, ear discharge, ear pain, facial swelling, hearing loss, mouth sores, nosebleeds, postnasal drip, rhinorrhea, sinus pressure, sneezing, sore throat, tinnitus, trouble swallowing and voice change.    Eyes:  Negative for photophobia, pain, discharge, redness, itching and visual disturbance.   Respiratory:  Negative for apnea, cough, choking, chest tightness, shortness of breath, wheezing and stridor.    Cardiovascular:  Negative for chest pain, palpitations and leg swelling.   Gastrointestinal:  Negative for heartburn, nausea, vomiting, abdominal pain, diarrhea, constipation, blood in stool, abdominal distention, anal bleeding and rectal pain.   Endocrine: Negative for cold intolerance, heat intolerance, polydipsia, polyphagia and polyuria.   Genitourinary:  Negative for bladder incontinence, dysuria, urgency, frequency, hematuria, flank pain and difficulty urinating.   Neurological:  Negative for dizziness, tremors, seizures, syncope, facial asymmetry, speech difficulty, weakness, light-headedness, numbness and headaches.   Psychiatric/Behavioral:  Negative for suicidal ideas, hallucinations, behavioral problems, confusion, sleep disturbance, self-injury, decreased concentration, agitation and depressed mood. The patient is not nervous/anxious and is not hyperactive.    All other systems reviewed and are negative.      Physical Exam:   Vital Signs:  Blood pressure 145/73, pulse 66, temperature 98.7 °F (37.1 °C), temperature source Oral, resp. rate 14, weight 215 lb (97.5 kg), SpO2 95%, not currently breastfeeding.  Physical Exam  Vitals and nursing note reviewed.   Constitutional:       General: She is not in acute distress.     Appearance: Normal appearance. She is well-groomed. She is not ill-appearing, toxic-appearing or diaphoretic.      Interventions: She is not intubated.  HENT:      Head: Atraumatic.   Eyes:      General: Gaze aligned appropriately. No  scleral icterus.        Right eye: No foreign body, discharge or hordeolum.         Left eye: No foreign body, discharge or hordeolum.      Extraocular Movements: Extraocular movements intact.      Right eye: Normal extraocular motion and no nystagmus.      Left eye: Normal extraocular motion and no nystagmus.      Conjunctiva/sclera:      Right eye: Right conjunctiva is not injected. No chemosis, exudate or hemorrhage.     Left eye: Left conjunctiva is not injected. No chemosis, exudate or hemorrhage.     Pupils: Pupils are equal, round, and reactive to light.   Cardiovascular:      Rate and Rhythm: Normal rate and regular rhythm.      Pulses: Normal pulses.   Pulmonary:      Effort: Pulmonary effort is normal. No tachypnea, bradypnea, accessory muscle usage, prolonged expiration, respiratory distress or retractions. She is not intubated.      Breath sounds: Normal breath sounds and air entry. No stridor, decreased air movement or transmitted upper airway sounds. No decreased breath sounds, wheezing, rhonchi or rales.   Chest:      Chest wall: No tenderness.   Abdominal:      General: Bowel sounds are normal. There is no distension.      Palpations: Abdomen is soft.      Tenderness: There is no abdominal tenderness. There is no right CVA tenderness, left CVA tenderness, guarding or rebound. Negative signs include Hdz's sign.   Musculoskeletal:      Right lower leg: No edema.      Left lower leg: No edema.   Lymphadenopathy:      Head:      Right side of head: No submental, submandibular, preauricular, posterior auricular or occipital adenopathy.      Left side of head: No submental, submandibular, preauricular, posterior auricular or occipital adenopathy.      Cervical: No cervical adenopathy.      Right cervical: No superficial, deep or posterior cervical adenopathy.     Left cervical: No superficial, deep or posterior cervical adenopathy.      Upper Body:      Right upper body: No supraclavicular adenopathy.       Left upper body: No supraclavicular adenopathy.   Neurological:      General: No focal deficit present.      Mental Status: She is alert and oriented to person, place, and time.      Cranial Nerves: Cranial nerves 2-12 are intact. No cranial nerve deficit, dysarthria or facial asymmetry.   Psychiatric:         Mood and Affect: Mood normal.         Behavior: Behavior is cooperative.       Cervical Papanicolaou contraindicated    Results:     Lab Results   Component Value Date    WBC 10.3 05/09/2024    HGB 13.8 05/09/2024    HCT 43.6 05/09/2024    .0 05/09/2024    CREATSERUM 0.71 05/09/2024    BUN 13 05/09/2024     05/09/2024    K 3.8 05/09/2024     05/09/2024    CO2 25.0 05/09/2024     (H) 05/09/2024    CA 9.7 05/09/2024    ALB 4.3 11/18/2023    ALKPHO 93 11/18/2023    BILT 0.4 11/18/2023    TP 7.0 11/18/2023    AST 28 11/18/2023    ALT 32 11/18/2023    PTT 29.2 12/01/2017    INR 0.93 06/14/2019    TSH 0.730 09/29/2022    MIRIAM 60 09/20/2019     09/20/2019    ESRML 32 (H) 11/18/2023    CRP 0.66 (H) 10/17/2019    TROPHS 4 05/09/2024    CK 96 11/02/2022     XR CHEST AP PORTABLE  (CPT=71045)    Result Date: 5/9/2024  CONCLUSION:   No new focal opacity, pleural effusion, or pneumothorax.  Unchanged minimal left basilar atelectasis/scarring.    Dictated by (CST): Arnold Victoria MD on 5/09/2024 at 2:25 PM     Finalized by (CST): Arnold Victoria MD on 5/09/2024 at 2:26 PM          CT BRAIN OR HEAD (64453)    Result Date: 5/9/2024  CONCLUSION:  1. No acute intracranial finding.    Dictated by (CST): Jose Astorga MD on 5/09/2024 at 1:42 PM     Finalized by (CST): Jose Astorga MD on 5/09/2024 at 1:43 PM         EKG 12 Lead    Result Date: 5/9/2024  Normal sinus rhythm Normal ECG When compared with ECG of 12-MAR-2024 19:43, No significant change was found Confirmed by CALE MCKEON ([id number]) on 5/9/2024 2:57:52 PM     Assessment/Plan:    Patient is a 73-year-old female with a history of  vertigo who presents with 2 syncopal episodes today.        Syncope and collapse  On telemetry.  PT/OT.  Cardiology and neurology consulted.  Check orthostatics. Awaiting TTE. Fall precautions.     DVT prophylaxis:   GI prophylaxis:   Code status: Full.   DW pt. and son in room with patient permission.    Discussed with RN taking care of patient.    **Certification    PHYSICIAN Certification of Need for Inpatient Hospitalization - Initial Certification    Patient will require inpatient services that will reasonably be expected to span two midnight's based on the clinical documentation in H+P.   Based on patients current state of illness, I anticipate that, after discharge, patient will require TBD.    Stefani Argueta MD  5/9/2024

## 2024-05-09 NOTE — ED QUICK NOTES
Patient stable for transfer at this time. A&Ox4. Skin p/w/d. Denies cp/pipe. IV site patent and intact. All belongings to accompany to floor.

## 2024-05-09 NOTE — ED QUICK NOTES
Orders for admission, patient is aware of plan and ready to go upstairs. Any questions, please call ED RN Ovidio at extension [phone].     Patient Covid vaccination status: Fully vaccinated     COVID Test Ordered in ED: None    COVID Suspicion at Admission: N/A    Running Infusions:  None    Mental Status/LOC at time of transport: A&Ox4    Other pertinent information: From home    CIWA score: N/A   NIH score:  N/A

## 2024-05-09 NOTE — CONSULTS
[facility]  Cardiology Consultation    Citlalli Najera Patient Status:  Emergency    1950 MRN [id number]   Location [facility] EMERGENCY DEPARTMENT Attending Willis Maravilla MD   Hosp Day # 0 PCP Stefani Argueta MD     Date of Admission:  2024  Date of Consult:  2024  Reason for Consultation:   Syncope    History of Present Illness:   Patient is a 73-year-old female with a history of vertigo who presents with 2 syncopal episodes today.  The first occurred this morning, after she walked her dogs outside and had a cup of couple, was doing housework when the next thing she remembers is waking up.  She does not remember having any symptoms prior to passing out and felt fine when she woke up.  Later in the day she was had another identical episode when she was picking up her dog from the .  She otherwise denied any symptoms in between these episodes, can walk 2 miles without difficulty, no chest pain, shortness of breath, palpitations, edema.  She does have intermittent vertigo but denies any vertigo-like symptoms    Troponin 5    CT head-no acute findings  CXR-no acute findings  ECG-sinus rhythm, 65 bpm    Past Medical History  Past Medical History:    Acid reflux disease    Breast mass in female    Difficulty sleeping    Elevated liver enzymes    Fatty liver disease, nonalcoholic    Pneumonia due to organism    Postmenopausal bleeding    Prediabetes    Submucous uterine fibroid    Vaccine counseling    Visual impairment    glasses     Past Surgical History  Past Surgical History:   Procedure Laterality Date    Breast surgery Right 2000    Removed Mass from breast two times Benign.     Colonoscopy  10/2018    Colonoscopy N/A 10/18/2018    Procedure: COLONOSCOPY, POSSIBLE BIOPSY, POSSIBLE POLYPECTOMY 29144;  Surgeon: Willis Nelson MD;  [facility] [place] [facility] 1 site left (cpt=19281)      Needle biopsy left       Family  History  Family History   Problem Relation Age of Onset    Diabetes Mother      Social History  She lives at home alone.  She drinks red wine every day.  Quit smoking 15 years ago.  Pediatric History   Patient Parents    Not on file     Other Topics Concern    Not on file   Social History Narrative      5 months ago.          Current Medications:  No current facility-administered medications for this encounter.     (Not in a hospital admission)    Allergies  Allergies   Allergen Reactions    Corn OTHER (SEE COMMENTS)     Allergy Test Screening    Dust Mite Mixed Allergen Ext [Mite (D. Farinae)] OTHER (SEE COMMENTS)     Allergy Test Screening    Fish OTHER (SEE COMMENTS)     Allergy Test Screening    Fish-Derived Products OTHER (SEE COMMENTS)     Allergy Test Screening    Other OTHER (SEE COMMENTS)     Allergy Test Screening - Cockroach    Shrimp OTHER (SEE COMMENTS)     Allergy Test Screening       Review of Systems:     14 point review of systems completed and negative except as noted.    Physical Exam:   Patient Vitals for the past 24 hrs:   BP Temp Temp src Pulse Resp SpO2 Weight   24 1400 (!) 161/89 -- -- 66 18 94 % --   24 1300 141/84 -- -- 75 19 97 % --   24 1200 136/74 -- -- 68 20 97 % --   24 1137 158/83 98.3 °F (36.8 °C) Oral 67 16 96 % 215 lb (97.5 kg)     General: Alert and oriented x 3. No apparent distress.   HEENT: Normocephalic, anicteric sclera, neck supple, no thyromegaly or adenopathy.  Neck: No JVD, carotids 2+, no bruits.  Cardiac: Regular rate and rhythm. S1, S2 normal. No murmur, pericardial rub, S3, or extra cardiac sounds.  Lungs: Clear without wheezes, rales, rhonchi or dullness.  Normal excursions and effort.  Abdomen: Soft, non-tender. No organosplenomegally, mass or rebound, BS-present.  Extremities: Without clubbing or cyanosis. No edema.    Neurologic: Alert and oriented, normal affect. No focal defects  Skin: Warm and dry.     Results:   Laboratory  Data:  Lab Results   Component Value Date    WBC 10.3 05/09/2024    HGB 13.8 05/09/2024    HCT 43.6 05/09/2024    .0 05/09/2024    CREATSERUM 0.71 05/09/2024    BUN 13 05/09/2024     05/09/2024    K 3.8 05/09/2024     05/09/2024    CO2 25.0 05/09/2024     (H) 05/09/2024    CA 9.7 05/09/2024    ALB 4.3 11/18/2023    ALKPHO 93 11/18/2023    TP 7.0 11/18/2023    AST 28 11/18/2023    ALT 32 11/18/2023    PTT 29.2 12/01/2017    INR 0.93 06/14/2019    PTP 12.3 06/14/2019    TSH 0.730 09/29/2022    MIRIAM 60 09/20/2019     09/20/2019    ESRML 32 (H) 11/18/2023    CRP 0.66 (H) 10/17/2019    CK 96 11/02/2022         Recent Labs   Lab 05/09/24  1147   *   BUN 13   CREATSERUM 0.71   CA 9.7      K 3.8      CO2 25.0     Recent Labs   Lab 05/09/24  1147   RBC 5.15   HGB 13.8   HCT 43.6   MCV 84.7   MCH 26.8   MCHC 31.7   RDW 14.0   NEPRELIM 6.13   WBC 10.3   .0       No results for input(s): \"BNPML\" in the last 168 hours.    No results for input(s): \"TROP\", \"CK\" in the last 168 hours.    Imaging:  XR CHEST AP PORTABLE  (CPT=71045)    Result Date: 5/9/2024  CONCLUSION:   No new focal opacity, pleural effusion, or pneumothorax.  Unchanged minimal left basilar atelectasis/scarring.    Dictated by (CST): Arnold Victoria MD on 5/09/2024 at 2:25 PM     Finalized by (CST): Arnold Victoria MD on 5/09/2024 at 2:26 PM          CT BRAIN OR HEAD (59945)    Result Date: 5/9/2024  CONCLUSION:  1. No acute intracranial finding.    Dictated by (CST): Jose Astorga MD on 5/09/2024 at 1:42 PM     Finalized by (CST): Jose Astorga MD on 5/09/2024 at 1:43 PM           Impression:   Syncope  - Unclear etiology, with normal blood work and ECG likely vasovagal in nature however will need to rule out other cardiac causes of syncope  - Transthoracic echocardiogram to rule out structural maladies  - Monitor on telemetry, may benefit from heart monitor as outpatient to rule out arrhythmia  -  Discussed cutting back on wine intake, stay hydrated with water, etc.  - Further workup per primary service    Thank you for allowing me to participate in the care of your patient.    Roger Banks MD  [facility]  5/9/2024

## 2024-05-09 NOTE — ED PROVIDER NOTES
Patient Seen in: [facility] Emergency Department      History     Chief Complaint   Patient presents with    Syncope     Stated Complaint: syncope    Subjective:   HPI    Patient presents to the emergency department after having 2 syncopal episodes today.  This morning she felt dizzy lightheaded fell and struck her head and lost consciousness briefly.  She was picking up her dogs at the pet store when she again passed out.  She has a history of vertigo which has been improving with physical therapy.  She denies chest pain, shortness of breath, nausea or vomiting.    Objective:   Past Medical History:    Acid reflux disease    Breast mass in female    Difficulty sleeping    Elevated liver enzymes    Fatty liver disease, nonalcoholic    Pneumonia due to organism    Postmenopausal bleeding    Prediabetes    Submucous uterine fibroid    Vaccine counseling    Visual impairment    glasses              Past Surgical History:   Procedure Laterality Date    Breast surgery Right     Removed Mass from breast two times Benign.     Colonoscopy  10/2018    Colonoscopy N/A 10/18/2018    Procedure: COLONOSCOPY, POSSIBLE BIOPSY, POSSIBLE POLYPECTOMY 18549;  Surgeon: Willis Nelson MD;  Location: [facility] SURGICAL [facility] localization wire 1 site left (cpt=19281)      Needle biopsy left                  Social History     Socioeconomic History    Marital status:    Occupational History    Occupation: Retired from [employer].    Tobacco Use    Smoking status: Former     Current packs/day: 0.00     Average packs/day: 1 pack/day for 40.0 years (40.0 ttl pk-yrs)     Types: Cigarettes     Start date:      Quit date:      Years since quittin.3    Smokeless tobacco: Never   Vaping Use    Vaping status: Never Used   Substance and Sexual Activity    Alcohol use: No    Drug use: No   Social History Narrative      5 months ago.      Social Determinants of Health     Food Insecurity:  No Food Insecurity (5/9/2024)    Food Insecurity     Food Insecurity: Never true   Transportation Needs: No Transportation Needs (5/9/2024)    Transportation Needs     Lack of Transportation: No   Housing Stability: Low Risk  (5/9/2024)    Housing Stability     Housing Instability: No              Review of Systems    Positive for stated complaint: syncope  Other systems are as noted in HPI.  Constitutional and vital signs reviewed.      All other systems reviewed and negative except as noted above.    Physical Exam     ED Triage Vitals [05/09/24 1137]   /83   Pulse 67   Resp 16   Temp 98.3 °F (36.8 °C)   Temp src Oral   SpO2 96 %   O2 Device None (Room air)       Current Vitals:   Vital Signs  BP: 143/67  Pulse: 66  Resp: 20  Temp: 98.5 °F (36.9 °C)  Temp src: Oral  MAP (mmHg): 87    Oxygen Therapy  SpO2: 95 %  O2 Device: None (Room air)            Physical Exam  Vitals and nursing note reviewed.   Constitutional:       General: She is not in acute distress.     Appearance: She is well-developed.   HENT:      Head: Normocephalic.      Nose: Nose normal.      Mouth/Throat:      Mouth: Mucous membranes are moist.   Eyes:      Conjunctiva/sclera: Conjunctivae normal.   Cardiovascular:      Rate and Rhythm: Normal rate and regular rhythm.      Heart sounds: No murmur heard.  Pulmonary:      Effort: Pulmonary effort is normal. No respiratory distress.      Breath sounds: Normal breath sounds.   Abdominal:      General: There is no distension.      Palpations: Abdomen is soft.      Tenderness: There is no abdominal tenderness.   Musculoskeletal:         General: No tenderness. Normal range of motion.      Cervical back: Normal range of motion and neck supple. No rigidity or tenderness.   Skin:     General: Skin is warm and dry.      Findings: No rash.   Neurological:      General: No focal deficit present.      Mental Status: She is alert and oriented to person, place, and time.      Cranial Nerves: No cranial  nerve deficit.      Sensory: No sensory deficit.      Motor: No weakness.      Coordination: Coordination normal.      Deep Tendon Reflexes: Reflexes normal.               ED Course     Labs Reviewed   BASIC METABOLIC PANEL (8) - Abnormal; Notable for the following components:       Result Value    Glucose 113 (*)     Calculated Osmolality 297 (*)     All other components within normal limits   LIPID PANEL - Abnormal; Notable for the following components:    HDL Cholesterol 60 (*)     Triglycerides 208 (*)     VLDL 34 (*)     All other components within normal limits   COMP METABOLIC PANEL (14) - Abnormal; Notable for the following components:    Calculated Osmolality 296 (*)     AST 35 (*)     All other components within normal limits   TROPONIN I HIGH SENSITIVITY - Normal   TROPONIN I HIGH SENSITIVITY - Normal   TROPONIN I HIGH SENSITIVITY - Normal   MAGNESIUM - Normal   CBC WITH DIFFERENTIAL WITH PLATELET    Narrative:     The following orders were created for panel order CBC With Differential With Platelet.  Procedure                               Abnormality         Status                     ---------                               -----------         ------                     CBC W/ DIFFERENTIAL[[id number]]                              Final result                 Please view results for these tests on the individual orders.   CBC WITH DIFFERENTIAL WITH PLATELET    Narrative:     The following orders were created for panel order CBC With Differential With Platelet.  Procedure                               Abnormality         Status                     ---------                               -----------         ------                     CBC W/ DIFFERENTIAL[[id number]]                              Final result                 Please view results for these tests on the individual orders.   RAINBOW DRAW LAVENDER   RAINBOW DRAW LIGHT GREEN   RAINBOW DRAW BLUE   RAINBOW DRAW GOLD   CBC W/ DIFFERENTIAL   CBC W/  DIFFERENTIAL     EKG    Rate, intervals and axes as noted on EKG Report.  Rate: 65 bpm  Rhythm: Sinus Rhythm  Reading: Normal EKG                          MDM        Admission disposition: 5/9/2024  2:27 PM                  Medical Decision Making  Differential diagnosis considered for dysrhythmia, anemia, electrolyte disturbance, dehydration, intracranial hemorrhage, skull fracture.    Problems Addressed:  Syncope and collapse: acute illness or injury    Amount and/or Complexity of Data Reviewed  Labs: ordered. Decision-making details documented in ED Course.     Details: Labs unremarkable  Radiology: ordered and independent interpretation performed. Decision-making details documented in ED Course.     Details: Chest x-ray and CT scan of the brain shows no acute cardiopulmonary disease and no intracranial hemorrhage.  ECG/medicine tests: ordered and independent interpretation performed. Decision-making details documented in ED Course.  Discussion of management or test interpretation with external provider(s): No clear indication or reason for her multiple syncopal episodes.  Will admit for observation.  Cardiology consulted and evaluated patient in the emergency department.    Risk  Decision regarding hospitalization.        Disposition and Plan     Clinical Impression:  1. Syncope and collapse         Disposition:  Admit  5/9/2024  2:27 pm    Follow-up:  No follow-up provider specified.  We recommend that you schedule follow up care with a primary care provider within the next three months to obtain basic health screening including reassessment of your blood pressure.      Medications Prescribed:  Current Discharge Medication List                            Hospital Problems       Present on Admission  Date Reviewed: 3/26/2024            ICD-10-CM Noted POA    * (Principal) Syncope and collapse R55 5/9/2024 Yes    High cholesterol E78.00 2/16/2018 Yes    Hyperglycemia R73.9 5/9/2024 Yes    Obstructive sleep apnea  syndrome G47.33 1/18/2018 Yes

## 2024-05-10 LAB
ALBUMIN SERPL-MCNC: 4 G/DL (ref 3.2–4.8)
ALBUMIN/GLOB SERPL: 1.7 {RATIO} (ref 1–2)
ALP LIVER SERPL-CCNC: 80 U/L
ALT SERPL-CCNC: 45 U/L
ANION GAP SERPL CALC-SCNC: 5 MMOL/L (ref 0–18)
AST SERPL-CCNC: 35 U/L (ref ?–34)
BASOPHILS # BLD AUTO: 0.09 X10(3) UL (ref 0–0.2)
BASOPHILS NFR BLD AUTO: 1.1 %
BILIRUB SERPL-MCNC: 0.5 MG/DL (ref 0.2–1.1)
BUN BLD-MCNC: 12 MG/DL (ref 9–23)
BUN/CREAT SERPL: 15.2 (ref 10–20)
CALCIUM BLD-MCNC: 9.1 MG/DL (ref 8.7–10.4)
CHLORIDE SERPL-SCNC: 109 MMOL/L (ref 98–112)
CO2 SERPL-SCNC: 29 MMOL/L (ref 21–32)
CREAT BLD-MCNC: 0.79 MG/DL
DEPRECATED RDW RBC AUTO: 43.1 FL (ref 35.1–46.3)
EGFRCR SERPLBLD CKD-EPI 2021: 80 ML/MIN/1.73M2 (ref 60–?)
EOSINOPHIL # BLD AUTO: 0.24 X10(3) UL (ref 0–0.7)
EOSINOPHIL NFR BLD AUTO: 2.9 %
ERYTHROCYTE [DISTWIDTH] IN BLOOD BY AUTOMATED COUNT: 14 % (ref 11–15)
GLOBULIN PLAS-MCNC: 2.4 G/DL (ref 2–3.5)
GLUCOSE BLD-MCNC: 94 MG/DL (ref 70–99)
HCT VFR BLD AUTO: 42.6 %
HGB BLD-MCNC: 14.1 G/DL
IMM GRANULOCYTES # BLD AUTO: 0.03 X10(3) UL (ref 0–1)
IMM GRANULOCYTES NFR BLD: 0.4 %
LYMPHOCYTES # BLD AUTO: 3.46 X10(3) UL (ref 1–4)
LYMPHOCYTES NFR BLD AUTO: 42 %
MAGNESIUM SERPL-MCNC: 2 MG/DL (ref 1.6–2.6)
MCH RBC QN AUTO: 27.9 PG (ref 26–34)
MCHC RBC AUTO-ENTMCNC: 33.1 G/DL (ref 31–37)
MCV RBC AUTO: 84.4 FL
MONOCYTES # BLD AUTO: 0.74 X10(3) UL (ref 0.1–1)
MONOCYTES NFR BLD AUTO: 9 %
NEUTROPHILS # BLD AUTO: 3.68 X10 (3) UL (ref 1.5–7.7)
NEUTROPHILS # BLD AUTO: 3.68 X10(3) UL (ref 1.5–7.7)
NEUTROPHILS NFR BLD AUTO: 44.6 %
OSMOLALITY SERPL CALC.SUM OF ELEC: 296 MOSM/KG (ref 275–295)
PLATELET # BLD AUTO: 299 10(3)UL (ref 150–450)
POTASSIUM SERPL-SCNC: 3.9 MMOL/L (ref 3.5–5.1)
PROT SERPL-MCNC: 6.4 G/DL (ref 5.7–8.2)
RBC # BLD AUTO: 5.05 X10(6)UL
SODIUM SERPL-SCNC: 143 MMOL/L (ref 136–145)
WBC # BLD AUTO: 8.2 X10(3) UL (ref 4–11)

## 2024-05-10 PROCEDURE — 99233 SBSQ HOSP IP/OBS HIGH 50: CPT | Performed by: INTERNAL MEDICINE

## 2024-05-10 PROCEDURE — 99222 1ST HOSP IP/OBS MODERATE 55: CPT | Performed by: OTHER

## 2024-05-10 PROCEDURE — 95816 EEG AWAKE AND DROWSY: CPT | Performed by: OTHER

## 2024-05-10 RX ORDER — LORAZEPAM 2 MG/ML
1 INJECTION INTRAMUSCULAR ONCE
Status: DISCONTINUED | OUTPATIENT
Start: 2024-05-10 | End: 2024-05-11

## 2024-05-10 NOTE — CONSULTS
[facility]  [address] [place] [address], [address]  [facility] [address]  [phone]          INPATIENT NEUROLOGY   INITIAL CONSULT NOTE       [facility]  part of [facility]    Report of Consultation    Citlalli Najera Patient Status:  Observation     1950 MRN [id number]    Location [facility] 3W/SW Attending Stefani Argueta MD    Hosp Day # 0 PCP Stefani Argueta MD      Date of Admission:  2024  Date of Consult:  5/10/2024    HPI:     Citlalli Najera is a 73 year old woman with medical history of GERD presented with 2 episodes of syncope.  1 occurred after she got out of bed with no prodromal symptoms.  She does not know how long she had lost consciousness.    She was able to eat breakfast and drop off her dogs for grooming.  Felt vertiginous after this for few minutes.  When she went to  her dog from a dog.  Patient another syncopal event that was witnessed by the .    There is no bowel or bladder involvement, abnormal movements, tongue biting or postictal symptoms.    Spoke to patient with  phone and son in English to confirm history.      Denies vertebrobasilar symptoms (including vertigo, despite this being mentioned from other historians).      CURRENT MEDICATIONS  No current outpatient medications on file.       OUTPATIENT MEDICATIONS  No current facility-administered medications on file prior to encounter.     Current Outpatient Medications on File Prior to Encounter   Medication Sig Dispense Refill    Amitriptyline HCl 100 MG Oral Tab Take 1 tablet (100 mg total) by mouth nightly. 90 tablet 1    montelukast 10 MG Oral Tab Take 1 tablet (10 mg total) by mouth nightly as needed. (Patient taking differently: Take 1 tablet (10 mg total) by mouth nightly as needed. 24 last picked up) 90 tablet 3    diazePAM (VALIUM) 2 MG Oral Tab Take 1 tablet (2 mg total) by mouth every 8 (eight) hours as needed (vertigo). (Patient  taking differently: Take 1 tablet (2 mg total) by mouth every 8 (eight) hours as needed (vertigo). 3/22/2024 last picked up) 30 tablet 0    meclizine 25 MG Oral Tab Take 1 tablet (25 mg total) by mouth 3 (three) times daily as needed. 15 tablet 0    celecoxib 100 MG Oral Cap Take 1 capsule (100 mg total) by mouth 2 (two) times daily. (Patient taking differently: Take 1 capsule (100 mg total) by mouth 2 (two) times daily. Hasn't got prescription since 12/13/23) 180 capsule 1    hydrocortisone (PROCTO-MED HC) 2.5 % External Cream Place 1 Application rectally 2 (two) times daily. 28 g 1    albuterol (PROAIR HFA) 108 (90 Base) MCG/ACT Inhalation Aero Soln Inhale 2 puffs into the lungs every 4 (four) hours as needed for Wheezing. (Patient taking differently: Inhale 2 puffs into the lungs every 4 (four) hours as needed for Wheezing. 11/17/23) 1 each 0    pantoprazole 40 MG Oral Tab EC Take 1 tablet (40 mg total) by mouth before breakfast. 90 tablet 3    gabapentin 300 MG Oral Cap Take 1 capsule (300 mg total) by mouth nightly. 30 capsule 1    ALREX 0.2 % Ophthalmic Suspension       ketoconazole 2 % External Shampoo Apply 1 mL topically twice a week. 120 mL 1    clotrimazole-betamethasone 1-0.05 % External Cream APPLY 1 APPLICATION TOPICALLY EVERY 12 HOURS 60 g 0    Betamethasone Dipropionate Aug 0.05 % External Lotion Apply 1 Application  topically 2 (two) times daily. 60 mL 0    betamethasone dipropionate 0.05 % External Cream Apply 1 Application  topically 2 (two) times daily. APPLY TO AFFECTED AREA (Patient not taking: Reported on 3/13/2024) 45 g 3    clobetasol 0.05 % External Cream Apply 1 Application  topically 2 (two) times daily. 45 g 0    Betamethasone Dipropionate Aug 0.05 % External Cream Apply 30 g topically 2 (two) times daily. (Patient not taking: Reported on 3/13/2024) 30 g 2    cyclobenzaprine 5 MG Oral Tab Take 1 tablet (5 mg) by mouth nightly as needed for muscles spasms      Flunisolide 25 MCG/ACT  (0.025%) Nasal Solution 2 sprays by Each Nare route 2 (two) times daily. 1 Inhaler 1        MEDICAL HISTORY  Past Medical History:    Acid reflux disease    Breast mass in female    Difficulty sleeping    Elevated liver enzymes    Fatty liver disease, nonalcoholic    Pneumonia due to organism    Postmenopausal bleeding    Prediabetes    Submucous uterine fibroid    Vaccine counseling    Visual impairment    glasses       ?SURGICAL HISTORY  Past Surgical History:   Procedure Laterality Date    Breast surgery Right 2000    Removed Mass from breast two times Benign.     Colonoscopy  10/2018    Colonoscopy N/A 10/18/2018    Procedure: COLONOSCOPY, POSSIBLE BIOPSY, POSSIBLE POLYPECTOMY 38292;  Surgeon: Willis Nelson MD;  Location: [facility], [facility] localization wire 1 site left (cpt=19281)      Needle biopsy left         SOCIAL HISTORY  Social History     Socioeconomic History    Marital status:    Occupational History    Occupation: Retired from [employer].    Tobacco Use    Smoking status: Former     Current packs/day: 0.00     Average packs/day: 1 pack/day for 40.0 years (40.0 ttl pk-yrs)     Types: Cigarettes     Start date:      Quit date:      Years since quittin.3    Smokeless tobacco: Never   Vaping Use    Vaping status: Never Used   Substance and Sexual Activity    Alcohol use: No    Drug use: No       FAMILY HISTORY  Family History   Problem Relation Age of Onset    Diabetes Mother        ALLERGIES  Allergies   Allergen Reactions    Corn OTHER (SEE COMMENTS)     Allergy Test Screening    Dust Mite Mixed Allergen Ext [Mite (D. Farinae)] OTHER (SEE COMMENTS)     Allergy Test Screening    Fish OTHER (SEE COMMENTS)     Allergy Test Screening    Fish-Derived Products OTHER (SEE COMMENTS)     Allergy Test Screening    Other OTHER (SEE COMMENTS)     Allergy Test Screening - Cockroach    Shrimp OTHER (SEE COMMENTS)     Allergy Test Screening       ?REVIEW OF SYSTEMS:    13-point review of systems was done and is negative unless otherwise stated in HPI.     ?PHYSICAL EXAM:     /86 (BP Location: Right arm)   Pulse 62   Temp 98.7 °F (37.1 °C) (Oral)   Resp 15   Wt 209 lb 3.2 oz (94.9 kg)   SpO2 98%   BMI 37.06 kg/m²   General appearance: Well appearing, and in no acute distress  Skin: skin color, texture normal.  No rashes or lesions.    Head: Normocephalic, atraumatic.    Neurological exam:  Mental Status: Alert, normal fund of knowledge, Follows commands, and Speech fluent and appropriate.  Cranial Nerves: visual fields intact to confrontation, extraocular movements intact, facial sensation intact, face symmetric, no facial droop or ptosis, normal bedside auditory acuity bilaterally, no dysarthria  Motor: muscle strength 5/5 both upper and lower extremities  Reflexes: UE and LE reflexes are equal and reactive  Sensation: intact to light touch  Coordination: Finger-to- nose-finger intact bilaterally   Gait: not assessed       LABS/DATA:    Lab Results   Component Value Date    WBC 8.2 05/10/2024    HGB 14.1 05/10/2024    HCT 42.6 05/10/2024    .0 05/10/2024    CREATSERUM 0.79 05/10/2024    BUN 12 05/10/2024     05/10/2024    K 3.9 05/10/2024     05/10/2024    CO2 29.0 05/10/2024    GLU 94 05/10/2024    CA 9.1 05/10/2024    ALB 4.0 05/10/2024    ALKPHO 80 05/10/2024    BILT 0.5 05/10/2024    TP 6.4 05/10/2024    AST 35 05/10/2024    ALT 45 05/10/2024    MG 2.0 05/10/2024       HGBA1C:    Lab Results   Component Value Date    A1C 6.3 (H) 10/28/2022    A1C 6.2 (H) 10/19/2021    A1C 5.9 (H) 05/28/2021     (H) 10/28/2022                IMAGING:    CT BRAIN OR HEAD (30059)    Result Date: 5/9/2024  CONCLUSION:  1. No acute intracranial finding.    Dictated by (CST): Jose Astorga MD on 5/09/2024 at 1:42 PM     Finalized by (CST): Jose Astorga MD on 5/09/2024 at 1:43 PM          I PERSONALLY REVIEWED THESE IMAGES.     ASSESSMENT:  The patient is a  73 year old woman with medical history of GERD presented with 2 episodes of syncope without prodromal symptoms.    Not orthostatic on vitals.      Loss of consciousness needs epilepsy, vertebrobasilar insufficiency and structural evaluation   -MRI and MRA brain and carotids  - Routine EEG      This note was prepared using Dragon Medical voice recognition dictation software and as a result, errors may occur. When identified, these errors have been corrected. While every attempt is made to correct errors during dictation, discrepancies may still exist    AURELIANO Monte DO   Staff Neurologist   5/10/2024  12:18 PM

## 2024-05-10 NOTE — PROGRESS NOTES
Progress Note  Citlalli Najera Patient Status:  Observation    1950 MRN [id number]   Location [facility] 3W/SW Attending Stefani Argueta MD   Hosp Day # 0 PCP Stefani Argueta MD     Subjective:  Pt denies lightheadedness, dizziness or palpitations this am. Family at bedside assisting with interpretation. States she has a mild headache    Objective:  /71 (BP Location: Right arm)   Pulse 70   Temp 98.9 °F (37.2 °C) (Oral)   Resp 15   Wt 209 lb 3.2 oz (94.9 kg)   SpO2 94%   BMI 37.06 kg/m²     Telemetry: NSR, rare PVC's    Intake/Output:    Intake/Output Summary (Last 24 hours) at 5/10/2024 0814  Last data filed at 2024  Gross per 24 hour   Intake 480 ml   Output --   Net 480 ml       Last 3 Weights   05/10/24 0534 209 lb 3.2 oz (94.9 kg)   24 1137 215 lb (97.5 kg)   24 1301 211 lb 12.8 oz (96.1 kg)   24 1512 212 lb (96.2 kg)       Labs:  Recent Labs   Lab 24  1147 05/10/24  0720   * 94   BUN 13 12   CREATSERUM 0.71 0.79   EGFRCR 91 80   CA 9.7 9.1    143   K 3.8 3.9    109   CO2 25.0 29.0     Recent Labs   Lab 24  1147 05/10/24  0720   RBC 5.15 5.05   HGB 13.8 14.1   HCT 43.6 42.6   MCV 84.7 84.4   MCH 26.8 27.9   MCHC 31.7 33.1   RDW 14.0 14.0   NEPRELIM 6.13 3.68   WBC 10.3 8.2   .0 299.0         Recent Labs   Lab 24  1147 24  1631 24  2049   TROPHS 5 4 3       Diagnostics:  XR CHEST AP PORTABLE  (CPT=71045)    Result Date: 2024  CONCLUSION:   No new focal opacity, pleural effusion, or pneumothorax.  Unchanged minimal left basilar atelectasis/scarring.    Dictated by (CST): Arnold Victoria MD on 2024 at 2:25 PM     Finalized by (CST): Arnold Victoria MD on 2024 at 2:26 PM          CT BRAIN OR HEAD (79960)    Result Date: 2024  CONCLUSION:  1. No acute intracranial finding.    Dictated by (CST): Jose Astorga MD on 2024 at 1:42 PM     Finalized by (CST): Jose Astorga MD on  5/09/2024 at 1:43 PM          Review of Systems   Cardiovascular:  Negative for chest pain, dyspnea on exertion, near-syncope and palpitations.   Respiratory: Negative.         Physical Exam:    Gen: alert, oriented x 3, NAD  Heent: pupils equal, reactive. Mucous membranes moist.   Neck: no jvd  Cardiac: regular rate and rhythm, normal S1,S2, no murmur, clicks, rub or gallop  Lungs: CTA  Abd: soft, NT/ND +bs  Ext: no edema  Skin: Warm, dry  Neuro: No focal deficits      Medications:     ketotifen  1 drop Both Eyes BID    Amitriptyline HCl  100 mg Oral Nightly         Assessment:  Syncope  NSR - no significant abnormalities on tele  TTE w/preserved LVEF, no RWMA or significant valvular dysfunction  Orthostatic BP negative  Hx vertigo - denies symptoms this am     Plan:  Echocardiogram and telemetry monitoring without significant abnormality.  Ambulate in halls this morning - if pt remains stable without symptoms, she may discharge home from cardiology perspective  Recommend 14 day MCT after discharge and follow up in the [facility] office with Dr Banks thereafter.     Plan of care discussed with patient, RN.    Cris Zamora, APRN  5/10/2024  8:14 AM  [phone] [facility]  [phone] [facility]

## 2024-05-10 NOTE — PROCEDURES
EEG report    REFERRING PHYSICIAN: Stefani Argueta MD    PCP and phone number:  Stefani Argueta MD  [phone]    TECHNIQUE: 21 channels of EEG, 2 channels of EOG, and 1 channel of EKG were recorded utilizing the International 10/20 System. The recording was performed in a digitized monopolar referential format and playback was reformatted into various referential and bipolar montages utilizing appropriate filter settings. Automatic seizure and spike detection programs were utilized throughout the recording.  Video was recorded during the study    CLINICAL DATA:  Patient is sent for the evaluation of possible seizures.    MEDICATION:  Continuous Medications:      Scheduled Medications:  No current outpatient medications on file.    PRN Medications:    albuterol    nitroglycerin    polyethylene glycol (PEG 3350)    sennosides    bisacodyl    fleet enema    ondansetron    metoclopramide    acetaminophen **OR** HYDROcodone-acetaminophen **OR** HYDROcodone-acetaminophen    melatonin    ACTIVATION:  Hyperventilation: Not done  Photic stimulation: Done, no abnormalities  Sleep: Normal sleep architecture was seen.    BACKGROUND  While the patient was awake, the posterior dominant rhythm consisted of well-regulated 9-10 Hz rhythmic waveforms, symmetrically distributed over both posterior quadrants and was reactive to eye opening.    EEG ABNORMALITY  None    IMPRESSION:  This is a normal EEG. No focal, lateralized, or epileptiform features are noted. Clinical correlation required.

## 2024-05-10 NOTE — PROGRESS NOTES
Vitals:    05/10/24 0534 05/10/24 0537 05/10/24 0541   BP: 146/75 151/73 144/71   Pulse: 58 67 70   SpO2: 96% 95% 94%   Position: Lying Sitting Standing      Patient reported no dizziness during orthostatic blood pressure reading.

## 2024-05-10 NOTE — PROGRESS NOTES
[facility]  part of [facility]    Progress Note    Citlalli Najera Patient Status:  Observation    1950 MRN [id number]   Location [facility] 3W/SW Attending Stefani Argueta MD   Hosp Day # 0 PCP Stefani Argueta MD     Chief Complaint: syncope    Subjective:     Constitutional:  Negative for activity change, appetite change, chills, diaphoresis, fatigue, fever and unexpected weight change.        Per patient, feels good today.   Respiratory:  Negative for apnea, cough, choking, chest tightness, shortness of breath, wheezing and stridor.    Cardiovascular:  Negative for chest pain, palpitations and leg swelling.   Gastrointestinal:  Negative for heartburn, nausea, vomiting, abdominal pain, diarrhea, constipation, blood in stool, abdominal distention, anal bleeding and rectal pain.   Endocrine: Negative for cold intolerance, heat intolerance, polydipsia, polyphagia and polyuria.   Genitourinary:  Negative for bladder incontinence, dysuria, urgency, frequency, hematuria and flank pain.   Neurological:  Negative for dizziness, tremors, seizures, syncope, facial asymmetry, speech difficulty, weakness, light-headedness, numbness and headaches.   Psychiatric/Behavioral:  Negative for sleep disturbance.    All other systems reviewed and are negative.      Objective:   Blood pressure 144/71, pulse 70, temperature 98.9 °F (37.2 °C), temperature source Oral, resp. rate 15, weight 209 lb 3.2 oz (94.9 kg), SpO2 94%, not currently breastfeeding.  Physical Exam  Constitutional:       General: She is not in acute distress.     Appearance: Normal appearance. She is well-groomed. She is not ill-appearing, toxic-appearing or diaphoretic.      Interventions: She is not intubated.  Eyes:      General: No scleral icterus.  Cardiovascular:      Rate and Rhythm: Normal rate and regular rhythm.   Pulmonary:      Effort: Pulmonary effort is normal. No tachypnea, bradypnea, accessory muscle usage,  prolonged expiration, respiratory distress or retractions. She is not intubated.      Breath sounds: Normal breath sounds. No stridor, decreased air movement or transmitted upper airway sounds. No decreased breath sounds, wheezing, rhonchi or rales.   Chest:      Chest wall: No tenderness.   Abdominal:      General: There is no distension.      Tenderness: There is no abdominal tenderness. There is no right CVA tenderness, left CVA tenderness, guarding or rebound.   Musculoskeletal:      Right lower leg: No edema.      Left lower leg: No edema.   Neurological:      Mental Status: She is alert and oriented to person, place, and time.   Psychiatric:         Mood and Affect: Mood normal.         Behavior: Behavior is cooperative.         Results:   Lab Results   Component Value Date    WBC 8.2 05/10/2024    HGB 14.1 05/10/2024    HCT 42.6 05/10/2024    .0 05/10/2024    CREATSERUM 0.79 05/10/2024    BUN 12 05/10/2024     05/10/2024    K 3.9 05/10/2024     05/10/2024    CO2 29.0 05/10/2024    GLU 94 05/10/2024    CA 9.1 05/10/2024    ALB 4.0 05/10/2024    ALKPHO 80 05/10/2024    BILT 0.5 05/10/2024    TP 6.4 05/10/2024    AST 35 (H) 05/10/2024    ALT 45 05/10/2024    PTT 29.2 12/01/2017    INR 0.93 06/14/2019    TSH 0.730 09/29/2022    MIRIAM 60 09/20/2019     09/20/2019    ESRML 32 (H) 11/18/2023    CRP 0.66 (H) 10/17/2019    MG 2.0 05/10/2024    TROPHS 3 05/09/2024    CK 96 11/02/2022       XR CHEST AP PORTABLE  (CPT=71045)    Result Date: 5/9/2024  CONCLUSION:   No new focal opacity, pleural effusion, or pneumothorax.  Unchanged minimal left basilar atelectasis/scarring.    Dictated by (CST): Arnold Victoria MD on 5/09/2024 at 2:25 PM     Finalized by (CST): Arnold Victoria MD on 5/09/2024 at 2:26 PM          CT BRAIN OR HEAD (27859)    Result Date: 5/9/2024  CONCLUSION:  1. No acute intracranial finding.    Dictated by (CST): Jose Astorga MD on 5/09/2024 at 1:42 PM     Finalized by (CST):  Jose Astorga MD on 5/09/2024 at 1:43 PM         EKG 12 Lead    Result Date: 5/9/2024  Normal sinus rhythm Normal ECG When compared with ECG of 12-MAR-2024 19:43, No significant change was found Confirmed by CALE MCKEON ([id number]) on 5/9/2024 2:57:52 PM     Assessment & Plan:    Patient is a 73-year-old female with a history of vertigo who presents with 2 syncopal episodes today.         Syncope and collapse  On telemetry.  PT/OT.  Cardiology and neurology consulted.  Negative orthostatics. TTE looks OK. Fall precautions.  Awaiting EEG.  Also awaiting MRA MRI.  Patient lives home alone.  Cleared by PT and OT.  Possibly home tomorrow.     DVT prophylaxis:   GI prophylaxis:   Code status: Full.   DW pt. and son in room with patient permission.    Discussed with RN taking care of patient.  Possibly home tomorrow.    Stefani Argueta MD  5/10/2024

## 2024-05-10 NOTE — PHYSICAL THERAPY NOTE
PHYSICAL THERAPY EVALUATION - INPATIENT     Room Number: COF11/COF11-A  Evaluation Date: 5/10/2024  Type of Evaluation: Initial   Physician Order: PT Eval and Treat    Presenting Problem:  (syncope)  Co-Morbidities : Hx vertigo  Reason for Therapy: Mobility Dysfunction and Discharge Planning    PHYSICAL THERAPY ASSESSMENT   Patient is a 73 year old female admitted 2024.  Pt is A& Ox4. Prior to admission, patient's baseline is Independent.  Patient is currently functioning at baseline with gait.  Patient is requiring independent as a result of the following impairments:  none .  Physical Therapy will continue to follow for duration of hospitalization. Patient is at baseline.    PLAN      Patient has been evaluated and presents with no skilled Physical Therapy needs at this time.  Patient will be discharged from Physical Therapy services. Please re-order if a new functional limitation presents during this admission.       PHYSICAL THERAPY MEDICAL/SOCIAL HISTORY   History related to current admission:      Problem List  Principal Problem:    Syncope and collapse  Active Problems:    Obstructive sleep apnea syndrome    High cholesterol    Hyperglycemia      HOME SITUATION  Home Situation  Type of Home: House  Home Layout: Two level  Lives With: Alone  Drives: Yes     Prior Level of [place]: ind    SUBJECTIVE  \"I'm a little dizzy not much\"    PHYSICAL THERAPY EXAMINATION   OBJECTIVE     Fall Risk: Standard fall risk    WEIGHT BEARING RESTRICTION  Weight Bearing Restriction: None                PAIN ASSESSMENT  Ratin          COGNITION  Overall Cognitive Status:  WFL - within functional limits    RANGE OF MOTION AND STRENGTH ASSESSMENT  Upper extremity ROM and strength are within functional limits   Lower extremity ROM is within functional limits   Lower extremity strength is within functional limits     BALANCE  Static Sitting: Good  Dynamic Sitting: Good  Static Standing: Good  Dynamic Standing:  Good    ACTIVITY TOLERANCE           BP: 144/54             O2 WALK       AM-PAC '6-Clicks' INPATIENT SHORT FORM - BASIC MOBILITY  How much difficulty does the patient currently have...  Patient Difficulty: Turning over in bed (including adjusting bedclothes, sheets and blankets)?: None   Patient Difficulty: Sitting down on and standing up from a chair with arms (e.g., wheelchair, bedside commode, etc.): None   Patient Difficulty: Moving from lying on back to sitting on the side of the bed?: None   How much help from another person does the patient currently need...   Help from Another: Moving to and from a bed to a chair (including a wheelchair)?: None   Help from Another: Need to walk in hospital room?: None   Help from Another: Climbing 3-5 steps with a railing?: None     AM-PAC Score:  Raw Score: 24   Approx Degree of Impairment: 0%   Standardized Score (AM-PAC Scale): 61.14   CMS Modifier (G-Code): CH    FUNCTIONAL ABILITY STATUS  Functional Mobility/Gait Assessment  Gait Assistance: Independent  Distance (ft): 250  Assistive Device: None  Rolling: contact guard assist  Supine to Sit: contact guard assist  Sit to Supine: contact guard assist  Sit to Stand: contact guard assist    Exercise/Education Provided:  Bed mobility  Gait training  Transfer training    The patient's Approx Degree of Impairment: 0% has been calculated based on documentation in the [facility] '6 clicks' Inpatient Basic Mobility Short Form.  Research supports that patients with this level of impairment may benefit from home.  Final disposition will be made by interdisciplinary medical team.    Patient End of Session: Up in chair        Patient Evaluation Complexity Level:  History Low - no personal factors and/or co-morbidities   Examination of body systems Low -  addressing 1-2 elements   Clinical Presentation Low- Stable   Clinical Decision Making  Low Complexity     Gait Trainin minutes

## 2024-05-10 NOTE — CM/SW NOTE
05/10/24 0900   CM/SW Referral Data   Referral Source Physician   Reason for Referral Discharge planning   Informant Other  (Grandson)      Reason for  Pt. is Limited English Proficient   Medical Hx   Does patient have an established PCP? Yes  (Stefani Argueta)   Patient Info   Advanced directives? Yes   Patient's Current Mental Status at Time of Assessment Alert;Oriented   Patient Communication Issues Language barrier   Patient's Home Environment House   Number of Levels in Home 2   Number of Stair in Home 1 external step to enter, 6 to bedroom   Patient lives with Alone   Patient Status Prior to Admission   Independent with ADLs and Mobility Yes   Discharge Needs   Anticipated D/C needs No anticipated discharge needs     SW met with pt bedside for above assessment.  Pt is AOX4, drives and independent at baseline without use of assistive devices.  NN anticipated, pt family will transport home at discharge.    SW/CM to remain available for support and/or discharge planning.      Ginette Deleon, MSW, LSW  Social Work/Case Management

## 2024-05-10 NOTE — PLAN OF CARE
Patient denies any dizziness or lightheadedness. 2D echo completed. Orthostatics completed and are negative.   Problem: Patient Centered Care  Goal: Patient preferences are identified and integrated in the patient's plan of care  Description: Interventions:  - What would you like us to know as we care for you? I have two dogs that I love so much   - Provide timely, complete, and accurate information to patient/family  - Incorporate patient and family knowledge, values, beliefs, and cultural backgrounds into the planning and delivery of care  - Encourage patient/family to participate in care and decision-making at the level they choose  - Honor patient and family perspectives and choices  Outcome: Progressing     Problem: Patient/Family Goals  Goal: Patient/Family Long Term Goal  Description: Patient's Long Term Goal: Go home to dogs     Interventions:  - Echo   -orthostatics   -cardiology to see  - See additional Care Plan goals for specific interventions  Outcome: Progressing  Goal: Patient/Family Short Term Goal  Description: Patient's Short Term Goal: Feel better to go back home w/ my dogs     Interventions:   - Echo   -orthostatics   -cardiology to see  - See additional Care Plan goals for specific interventions  Outcome: Progressing

## 2024-05-10 NOTE — PLAN OF CARE
Patient denies any dizziness. PT/OT today. Call light within reach. Safety precautions in place. Plan: MRI/ MRA Brain and Carotids. Family and patient updated at bedside.   Problem: Patient Centered Care  Goal: Patient preferences are identified and integrated in the patient's plan of care  Description: Interventions:  - What would you like us to know as we care for you? I have two dogs that I love so much   - Provide timely, complete, and accurate information to patient/family  - Incorporate patient and family knowledge, values, beliefs, and cultural backgrounds into the planning and delivery of care  - Encourage patient/family to participate in care and decision-making at the level they choose  - Honor patient and family perspectives and choices  Outcome: Progressing     Problem: Patient/Family Goals  Goal: Patient/Family Long Term Goal  Description: Patient's Long Term Goal: Go home to dogs     Interventions:  - Echo   -orthostatics   -cardiology to see  - See additional Care Plan goals for specific interventions  Outcome: Progressing  Goal: Patient/Family Short Term Goal  Description: Patient's Short Term Goal: Feel better to go back home w/ my dogs     Interventions:   - Echo   -orthostatics   -cardiology to see  - See additional Care Plan goals for specific interventions  Outcome: Progressing     Problem: CARDIOVASCULAR - ADULT  Goal: Maintains optimal cardiac output and hemodynamic stability  Description: INTERVENTIONS:  - Monitor vital signs, rhythm, and trends  - Monitor for bleeding, hypotension and signs of decreased cardiac output  - Evaluate effectiveness of vasoactive medications to optimize hemodynamic stability  - Monitor arterial and/or venous puncture sites for bleeding and/or hematoma  - Assess quality of pulses, skin color and temperature  - Assess for signs of decreased coronary artery perfusion - ex. Angina  - Evaluate fluid balance, assess for edema, trend weights  Outcome: Progressing      Problem: NEUROLOGICAL - ADULT  Goal: Achieves stable or improved neurological status  Description: INTERVENTIONS  - Assess for and report changes in neurological status  - Initiate measures to prevent increased intracranial pressure  - Maintain blood pressure and fluid volume within ordered parameters to optimize cerebral perfusion and minimize risk of hemorrhage  - Monitor temperature, glucose, and sodium. Initiate appropriate interventions as ordered  Outcome: Progressing

## 2024-05-10 NOTE — PLAN OF CARE
Problem: Patient Centered Care  Goal: Patient preferences are identified and integrated in the patient's plan of care  Description: Interventions:  - What would you like us to know as we care for you? I have two dogs that I love so much   - Provide timely, complete, and accurate information to patient/family  - Incorporate patient and family knowledge, values, beliefs, and cultural backgrounds into the planning and delivery of care  - Encourage patient/family to participate in care and decision-making at the level they choose  - Honor patient and family perspectives and choices  Outcome: Progressing     Problem: Patient/Family Goals  Goal: Patient/Family Long Term Goal  Description: Patient's Long Term Goal: Go home to dogs     Interventions:  - Echo   -orthostatics   -cardiology to see  - See additional Care Plan goals for specific interventions  Outcome: Progressing  Goal: Patient/Family Short Term Goal  Description: Patient's Short Term Goal: Feel better to go back home w/ my dogs     Interventions:   - Echo   -orthostatics   -cardiology to see  - See additional Care Plan goals for specific interventions  Outcome: Progressing     Problem: CARDIOVASCULAR - ADULT  Goal: Maintains optimal cardiac output and hemodynamic stability  Description: INTERVENTIONS:  - Monitor vital signs, rhythm, and trends  - Monitor for bleeding, hypotension and signs of decreased cardiac output  - Evaluate effectiveness of vasoactive medications to optimize hemodynamic stability  - Monitor arterial and/or venous puncture sites for bleeding and/or hematoma  - Assess quality of pulses, skin color and temperature  - Assess for signs of decreased coronary artery perfusion - ex. Angina  - Evaluate fluid balance, assess for edema, trend weights  Outcome: Progressing     Problem: NEUROLOGICAL - ADULT  Goal: Achieves stable or improved neurological status  Description: INTERVENTIONS  - Assess for and report changes in neurological status  -  Initiate measures to prevent increased intracranial pressure  - Maintain blood pressure and fluid volume within ordered parameters to optimize cerebral perfusion and minimize risk of hemorrhage  - Monitor temperature, glucose, and sodium. Initiate appropriate interventions as ordered  Outcome: Progressing     Patient A&Ox4 on room air. Lao speaking only. Used c-pap overnight. Up with standby-assist. Echo results pending. Safety precautions maintained, call light and personal belongings within reach. Continuing to monitor.

## 2024-05-10 NOTE — OCCUPATIONAL THERAPY NOTE
OCCUPATIONAL THERAPY EVALUATION - INPATIENT     Room Number: COF11/COF11-A  Evaluation Date: 5/10/2024  Type of Evaluation: Quick Eval  Presenting Problem: syncope and collapse    Physician Order: IP Consult to Occupational Therapy  Reason for Therapy: ADL/IADL Dysfunction and Discharge Planning    OCCUPATIONAL THERAPY ASSESSMENT   Patient is a 73 year old female admitted 5/9/2024 for syncope and collapse.  Prior to admission, patient's baseline is independent with I/ADLs, including driving, and with fx mobility/transfers without a device.  Patient is currently functioning near baseline with  ADLs and fx mobility/transfers .  Anticipate patient will progress to safely discharge home with additional support as needed once medically cleared.    PLAN  Patient has been evaluated and presents with no skilled Occupational Therapy needs  at this time.  Patient will be discharged from Occupational Therapy services. Please re-order if a new functional limitation presents during this admission.     OCCUPATIONAL THERAPY MEDICAL/SOCIAL HISTORY   Problem List  Principal Problem:    Syncope and collapse  Active Problems:    Obstructive sleep apnea syndrome    High cholesterol    Hyperglycemia    HOME SITUATION  Type of Home: House  Home Layout: Two level  Lives With: Alone  Shower/Tub and Equipment: Tub-shower combo  Drives: Yes  Stairs in Home: 1 BENJAMIN, 5 upstairs with railing  Use of Assistive Device(s): None    SUBJECTIVE  \"I've had vertigo before but it didn't feel like that. Vertigo is different, it feels like the room is spinning.\" (In [place])  \"I walk 2 miles everyday.\"    OCCUPATIONAL THERAPY EXAMINATION    OBJECTIVE  Fall Risk: Standard fall risk    WEIGHT BEARING RESTRICTION  None    PAIN ASSESSMENT  Rating: -- (not rated)  Location: headache  Management Techniques: Activity promotion; Body mechanics; Repositioning    ACTIVITY TOLERANCE  Pulse: 72  Heart Rate Source: Monitor     BP: 144/54  BP Location: Right arm  BP  Method: Automatic  Patient Position: Sitting    COGNITION  Overall Cognitive Status:  WFL - within functional limits    SENSATION  Light touch:  intact    RANGE OF MOTION   Upper extremity ROM is within functional limits     STRENGTH ASSESSMENT  Upper extremity strength is within functional limits     COORDINATION  Gross Motor: WFL   Fine Motor: WFL     ACTIVITIES OF DAILY LIVING ASSESSMENT  AM-PAC ‘6-Clicks’ Inpatient Daily Activity Short Form  How much help from another person does the patient currently need…  -   Putting on and taking off regular lower body clothing?: A Little  -   Bathing (including washing, rinsing, drying)?: A Little  -   Toileting, which includes using toilet, bedpan or urinal? : A Little  -   Putting on and taking off regular upper body clothing?: None  -   Taking care of personal grooming such as brushing teeth?: None  -   Eating meals?: None    AM-PAC Score:  Score: 21  Approx Degree of Impairment: 32.79%  Standardized Score (AM-PAC Scale): 44.27  CMS Modifier (G-Code): CJ    BED MOBILITY  Supine to Sit: Independent     FUNCTIONAL TRANSFER ASSESSMENT  Sit to Stand from EOB: Independent  Chair Transfer: Independent    FUNCTIONAL MOBILITY  SUP for hallway fx mobility without a device    FUNCTIONAL ADL ASSESSMENT  Eating: independent (per obs)   Grooming: independent (per obs)   UB Dressing: independent (per obs)   LB Dressing: supervision  Toileting: supervision (per obs)     EDUCATION PROVIDED  Patient: Role of Occupational Therapy; Plan of Care; Discharge Recommendations; Functional Transfer Techniques; Fall Prevention; Posture/Positioning; Energy Conservation; Proper Body Mechanics; Compensatory ADL Techniques  Patient's Response to Education: Returned Demonstration; Verbalized Understanding    The patient's Approx Degree of Impairment: 32.79% has been calculated based on documentation in the [facility] '6 clicks' Inpatient Daily Activity Short Form.  Research supports that patients with this  level of impairment may benefit from discharge home without skilled OT needs.  Final disposition will be made by interdisciplinary medical team.    Patient End of Session: Up in chair;Needs met;Call light within reach;RN aware of session/findings;All patient questions and concerns addressed;Family present    Patient was able to achieve the following ...   Patient able to toilet transfer  Independent based on similar fx t/f's    Patient able to dress lower extremities   SUP    Patient/Caregiver able to demonstrate safety with ADLS  at previous functional level     Patient Evaluation Complexity Level:   Occupational Profile/Medical History LOW - Brief history including review of medical or therapy records    Specific performance deficits impacting engagement in ADL/IADL LOW  1 - 3 performance deficits    Client Assessment/Performance Deficits LOW - No comorbidities nor modifications of tasks    Clinical Decision Making LOW - Analysis of occupational profile, problem-focused assessments, limited treatment options    Overall Complexity LOW     OT Session Time  Therapeutic Activity: 15 minutes    DEANGELO Nelson/L  [facility]  #[id number]

## 2024-05-11 ENCOUNTER — HOSPITAL ENCOUNTER (OUTPATIENT)
Dept: MRI IMAGING | Facility: HOSPITAL | Age: 74
Setting detail: OBSERVATION
End: 2024-05-11
Attending: Other

## 2024-05-11 VITALS
DIASTOLIC BLOOD PRESSURE: 70 MMHG | TEMPERATURE: 98 F | RESPIRATION RATE: 18 BRPM | SYSTOLIC BLOOD PRESSURE: 135 MMHG | BODY MASS INDEX: 37 KG/M2 | WEIGHT: 208 LBS | HEART RATE: 78 BPM | OXYGEN SATURATION: 93 %

## 2024-05-11 PROBLEM — H81.10 BENIGN PAROXYSMAL POSITIONAL VERTIGO: Status: ACTIVE | Noted: 2024-05-11

## 2024-05-11 PROCEDURE — 99231 SBSQ HOSP IP/OBS SF/LOW 25: CPT | Performed by: OTHER

## 2024-05-11 PROCEDURE — 99239 HOSP IP/OBS DSCHRG MGMT >30: CPT | Performed by: INTERNAL MEDICINE

## 2024-05-11 NOTE — PLAN OF CARE
Patient is alert and oriented times 4. Primarily Vincentian speaking. On RA. Complains of no pain at this time. Plan for MRI today.   Problem: Patient Centered Care  Goal: Patient preferences are identified and integrated in the patient's plan of care  Description: Interventions:  - What would you like us to know as we care for you? I have two dogs that I love so much   - Provide timely, complete, and accurate information to patient/family  - Incorporate patient and family knowledge, values, beliefs, and cultural backgrounds into the planning and delivery of care  - Encourage patient/family to participate in care and decision-making at the level they choose  - Honor patient and family perspectives and choices  Outcome: Progressing     Problem: Patient/Family Goals  Goal: Patient/Family Short Term Goal  Description: Patient's Short Term Goal: Feel better to go back home w/ my dogs     Interventions:   - Echo   -orthostatics   -cardiology to see  - See additional Care Plan goals for specific interventions  Outcome: Progressing     Problem: CARDIOVASCULAR - ADULT  Goal: Maintains optimal cardiac output and hemodynamic stability  Description: INTERVENTIONS:  - Monitor vital signs, rhythm, and trends  - Monitor for bleeding, hypotension and signs of decreased cardiac output  - Evaluate effectiveness of vasoactive medications to optimize hemodynamic stability  - Monitor arterial and/or venous puncture sites for bleeding and/or hematoma  - Assess quality of pulses, skin color and temperature  - Assess for signs of decreased coronary artery perfusion - ex. Angina  - Evaluate fluid balance, assess for edema, trend weights  Outcome: Progressing

## 2024-05-11 NOTE — PROGRESS NOTES
Patient and family provided with discharge instructions. All questions answered. Removed IV and Tele box.

## 2024-05-11 NOTE — PROGRESS NOTES
[facility]  [address] , [address]  [facility] [address]  [phone]          INPATIENT NEUROLOGY   FOLLOW UP CONSULT NOTE       [facility]  part of [facility]    Report of Consultation    Citlalli Najera Patient Status:  Observation     1950 MRN [id number]    Location [facility] 3W/SW Attending Stefani Argueta MD    Hosp Day # 0 PCP Stefani Argueta MD      Date of Admission:  2024  Date of Consult Follow Up:  2024        INTERVAL HPI:   -no acute events.  Wants to go home.  Used  phone.         ?PHYSICAL EXAM:   /70 (BP Location: Right arm)   Pulse 78   Temp 98.3 °F (36.8 °C) (Oral)   Resp 18   Wt 208 lb (94.3 kg)   SpO2 93%   BMI 36.85 kg/m²   General appearance: Well appearing, and in no acute distress  Skin: skin color, texture normal.  No rashes or lesions.    Head: Normocephalic, atraumatic.    Neurological exam:  Mental Status: Alert, oriented to situation/normal fund of knowledge, and Speech fluent and appropriate.      LABS/DATA:         HGBA1C:    Lab Results   Component Value Date    A1C 6.3 (H) 10/28/2022    A1C 6.2 (H) 10/19/2021    A1C 5.9 (H) 2021     (H) 10/28/2022              IMAGING:  XR CHEST AP PORTABLE  (CPT=71045)    Result Date: 2024  CONCLUSION:   No new focal opacity, pleural effusion, or pneumothorax.  Unchanged minimal left basilar atelectasis/scarring.    Dictated by (CST): Arnold Victoria MD on 2024 at 2:25 PM     Finalized by (CST): Arnold Victoria MD on 2024 at 2:26 PM          CT BRAIN OR HEAD (84664)    Result Date: 2024  CONCLUSION:  1. No acute intracranial finding.    Dictated by (CST): Jose Astorga MD on 2024 at 1:42 PM     Finalized by (CST): Jose Astorga MD on 2024 at 1:43 PM              ASSESSMENT:  The patient is a 73 year old woman with medical history of GERD presented with 2 episodes of syncope without prodromal  symptoms.    Not orthostatic on vitals.    EEG normal     Loss of consciousness needs vertebrobasilar insufficiency and structural evaluation.  Seizure less likely with normal EEG.   -MRI and MRA brain and carotids - can be done as outpatient if patient prefers - she does not want to stay overnight for testing   Follow up in 1 month.      This note was prepared using Dragon Medical voice recognition dictation software and as a result, errors may occur. When identified, these errors have been corrected. While every attempt is made to correct errors during dictation, discrepancies may still exist    AURELIANO Monte DO   Staff Neurologist   5/11/2024  11:33 AM

## 2024-05-11 NOTE — PLAN OF CARE
Pt up ambulating in hernandez standby assist. No complaints of pain. Safety precautions maintained and in place. Plan for discharge home pending medical clearance.    Problem: Patient Centered Care  Goal: Patient preferences are identified and integrated in the patient's plan of care  Description: Interventions:  - What would you like us to know as we care for you? I have two dogs that I love so much   - Provide timely, complete, and accurate information to patient/family  - Incorporate patient and family knowledge, values, beliefs, and cultural backgrounds into the planning and delivery of care  - Encourage patient/family to participate in care and decision-making at the level they choose  - Honor patient and family perspectives and choices  Outcome: Progressing     Problem: Patient/Family Goals  Goal: Patient/Family Long Term Goal  Description: Patient's Long Term Goal: Go home to dogs     Interventions:  - Echo   -orthostatics   -cardiology to see  - See additional Care Plan goals for specific interventions  Outcome: Progressing  Goal: Patient/Family Short Term Goal  Description: Patient's Short Term Goal: Feel better to go back home w/ my dogs     Interventions:   - Echo   -orthostatics   -cardiology to see  - See additional Care Plan goals for specific interventions  Outcome: Progressing     Problem: CARDIOVASCULAR - ADULT  Goal: Maintains optimal cardiac output and hemodynamic stability  Description: INTERVENTIONS:  - Monitor vital signs, rhythm, and trends  - Monitor for bleeding, hypotension and signs of decreased cardiac output  - Evaluate effectiveness of vasoactive medications to optimize hemodynamic stability  - Monitor arterial and/or venous puncture sites for bleeding and/or hematoma  - Assess quality of pulses, skin color and temperature  - Assess for signs of decreased coronary artery perfusion - ex. Angina  - Evaluate fluid balance, assess for edema, trend weights  Outcome: Progressing     Problem:  NEUROLOGICAL - ADULT  Goal: Achieves stable or improved neurological status  Description: INTERVENTIONS  - Assess for and report changes in neurological status  - Initiate measures to prevent increased intracranial pressure  - Maintain blood pressure and fluid volume within ordered parameters to optimize cerebral perfusion and minimize risk of hemorrhage  - Monitor temperature, glucose, and sodium. Initiate appropriate interventions as ordered  Outcome: Progressing

## 2024-05-11 NOTE — DISCHARGE SUMMARY
[facility]  part of [facility]    Discharge Summary    Citlalli Najera Patient Status:  Observation    1950 MRN [id number]   Location [facility] 3W/SW Attending Stefani Argueta MD   Hosp Day # 0 PCP Stefani Argueta MD     Date of Admission: 2024   Date of Discharge: 2024    Admitting Diagnosis: Syncope and collapse [R55]    Disposition: Home    Discharge Diagnosis: .Principal Problem:    Syncope and collapse  Active Problems:    Obstructive sleep apnea syndrome    High cholesterol    Hyperglycemia      Hospital Course:   Reason for Admission: Syncope    Hospital Course: History copied from admission summary by Dr. Argueta       Syncope      Workup this morning at 8 AM and after getting out of bed had a syncopal episode no prodromal symptoms per patient.  No chest pain no palpitations no feeling of dizziness no nausea no vomiting no double vision blurry vision no headache no numbness tingling no significant pain.  Does not know how long she was out.  Unwitnessed event.  She woke up between the dresser and her bed.  Then she ate breakfast like she normally is after preparing it.  She dropped off her dogs at the [employer] for dog grooming.  On the way back try she felt vertigo symptoms are typical vertigo.  Lasted for few minutes and went away.  After 1 hour she went back to  her dogs from the dog only place.  In the place when she went to pay money she had another syncopal episode.  This was witnessed by  the .  Denies any bowel or bladder incontinence.  Denies any shaking episodes.  Denies any postictal phase.  Denies any tongue biting the first time.  No previous history of seizures.  Troponin 5     CT head-no acute findings  CXR-no acute findings  ECG-sinus rhythm, 65 bpm    Syncope-neurology cleared for discharge by doing MRI MRI as outpatient.  Cardiology cleared patient for discharge as well.  GERD-continue PPI  Anxiety/BPPV-stable            Discharge  Physical Exam:  Vital Signs:  Blood pressure 135/70, pulse 78, temperature 98.3 °F (36.8 °C), temperature source Oral, resp. rate 18, weight 208 lb (94.3 kg), SpO2 93%, not currently breastfeeding.     General: No acute distress. Alert and oriented x 3.  HEENT: Moist mucous membranes.   Neck: Supple No JVD. No carotid bruits.  Respiratory: Clear to auscultation bilaterally.  No wheezes. No rhonchi.  Cardiovascular: S1, S2.  Regular rate and rhythm.  No murmurs. Equal pulses   Abdomen: Soft, nontender, nondistended.  Positive bowel sounds. No rebound tenderness  Neurologic: No focal neurological deficits.  Musculoskeletal: Motor strength 5/5  Integument: No lesions. No erythema.  Psychiatric: Appropriate mood and affect.    Complications: None    Consultants         Provider   Role Specialty     Velasquez Sigala DO      Consulting Physician NEUROLOGY     Eusebio Coffey MD      Consulting Physician Interventional, Cardiology     Roger Banks MD      Consulting Physician Cardiovascular Diseases                Discharge Plan:   Discharge Condition: Stable    Current Discharge Medication List        Home Meds - Unchanged    Details   Amitriptyline HCl 100 MG Oral Tab Take 1 tablet (100 mg total) by mouth nightly.      montelukast 10 MG Oral Tab Take 1 tablet (10 mg total) by mouth nightly as needed.      diazePAM (VALIUM) 2 MG Oral Tab Take 1 tablet (2 mg total) by mouth every 8 (eight) hours as needed (vertigo).      meclizine 25 MG Oral Tab Take 1 tablet (25 mg total) by mouth 3 (three) times daily as needed.      celecoxib 100 MG Oral Cap Take 1 capsule (100 mg total) by mouth 2 (two) times daily.      hydrocortisone (PROCTO-MED HC) 2.5 % External Cream Place 1 Application rectally 2 (two) times daily.      albuterol (PROAIR HFA) 108 (90 Base) MCG/ACT Inhalation Aero Soln Inhale 2 puffs into the lungs every 4 (four) hours as needed for Wheezing.      pantoprazole 40 MG Oral Tab EC Take 1 tablet (40 mg total) by  mouth before breakfast.      ALREX 0.2 % Ophthalmic Suspension       ketoconazole 2 % External Shampoo Apply 1 mL topically twice a week.      clotrimazole-betamethasone 1-0.05 % External Cream APPLY 1 APPLICATION TOPICALLY EVERY 12 HOURS      betamethasone dipropionate 0.05 % External Cream Apply 1 Application  topically 2 (two) times daily. APPLY TO AFFECTED AREA      clobetasol 0.05 % External Cream Apply 1 Application  topically 2 (two) times daily.                 Discharge Diet: As tolerated    Discharge Activity: As tolerated    Follow up:      Follow-up Information       Roger Banks MD Follow up.    Specialties: Cardiovascular Diseases, CARDIOLOGY  Why: Office will call you to schedule heart monitor and follow up visit  Contact information:  [address] [facility] [facility] [address]  [facility] [id number] [phone]               Bella Monte,  Follow up in 1 month(s).    Specialty: NEUROLOGY  Contact information:  [address] [facility] [address]  [facility] [id number] [phone]               Stefani Argueta MD Follow up in 1 week(s).    Specialty: Internal Medicine  Contact information:  [address] [facility] [address]  [phone]                             Follow up Labs: None        Hospital Discharge Diagnoses: Syncope, vertigo    Lace+ Score: 67  59-90 High Risk  29-58 Medium Risk  0-28   Low Risk.    TCM Follow-Up Recommendation:  LACE > 58: High Risk of readmission after discharge from the hospital.    Time spent:  30 to 74 minutes, more than 50% of the time was spend on counseling and coordination of care.    Juan Garcia MD  5/11/2024

## 2024-05-11 NOTE — PROGRESS NOTES
Progress Note  Citlalli Najera Patient Status:  Observation    1950 MRN [id number]   Location [facility] 3W/SW Attending Stefani Argueta MD   Hosp Day # 0 PCP Stefani Argueta MD     Subjective:  Pt denies lightheadedness, dizziness or palpitations this am. Family at bedside assisting with interpretation. She is primarily Latvian-speaking. She is hoping to go home today. Waiting for MRI    Objective:  /70 (BP Location: Right arm)   Pulse 78   Temp 98.3 °F (36.8 °C) (Oral)   Resp 18   Wt 208 lb (94.3 kg)   SpO2 93%   BMI 36.85 kg/m²     Telemetry: NSR, rare PVC    Intake/Output:    Intake/Output Summary (Last 24 hours) at 2024 1213  Last data filed at 2024 0900  Gross per 24 hour   Intake 240 ml   Output --   Net 240 ml       Last 3 Weights   24 0416 208 lb (94.3 kg)   05/10/24 0534 209 lb 3.2 oz (94.9 kg)   24 1137 215 lb (97.5 kg)   24 1301 211 lb 12.8 oz (96.1 kg)   24 1512 212 lb (96.2 kg)       Labs:  Recent Labs   Lab 24  1147 05/10/24  0720   * 94   BUN 13 12   CREATSERUM 0.71 0.79   EGFRCR 91 80   CA 9.7 9.1    143   K 3.8 3.9    109   CO2 25.0 29.0     Recent Labs   Lab 24  1147 05/10/24  0720   RBC 5.15 5.05   HGB 13.8 14.1   HCT 43.6 42.6   MCV 84.7 84.4   MCH 26.8 27.9   MCHC 31.7 33.1   RDW 14.0 14.0   NEPRELIM 6.13 3.68   WBC 10.3 8.2   .0 299.0         Recent Labs   Lab 24  1147 24  1631 24  2049   TROPHS 5 4 3       Diagnostics:  No results found.   Review of Systems   Cardiovascular: Negative.    Respiratory: Negative.     Neurological:  Negative for dizziness, light-headedness and weakness.       Physical Exam:    Gen: alert, oriented x 3, NAD  Heent: pupils equal, reactive. Mucous membranes moist.   Neck: no jvd  Cardiac: regular rate and rhythm, normal S1,S2, no murmur, clicks, rub or gallop  Lungs: CTA  Abd: soft, NT/ND +bs  Ext: no edema  Skin: Warm, dry  Neuro: No focal  deficits      Medications:     LORazepam  1 mg Intravenous Once    ketotifen  1 drop Both Eyes BID    Amitriptyline HCl  100 mg Oral Nightly       Assessment:  Syncope  CT Brain w/o acute abnormality  MRI Brain/Carotids pending - per neurology service  Normal EEG  NSR - no significant abnormalities on tele  TTE w/preserved LVEF, no RWMA or significant valvular dysfunction  Orthostatic BP negative  Hx vertigo - meclizine prn at home  Denies dizziness or lightheadedness, ambulating w/o difficulty  Plan:  Echocardiogram with preserved LVEF, no wall motion abnormalities or significant valvular dysfunction.   Recommend 14 day MCT after discharge and follow up in the [facility] office with Dr Banks thereafter.   Pt is stable from cardiology perspective. We will sign off at this time.     Plan of care discussed with patient, RN.    Cris Zamora, APRN  5/11/2024  12:13 PM  [phone] [facility]  [phone] [facility]

## 2024-05-13 ENCOUNTER — PATIENT OUTREACH (OUTPATIENT)
Dept: CASE MANAGEMENT | Age: 74
End: 2024-05-13

## 2024-05-13 DIAGNOSIS — G47.33 OBSTRUCTIVE SLEEP APNEA: ICD-10-CM

## 2024-05-13 DIAGNOSIS — E78.00 HIGH CHOLESTEROL: ICD-10-CM

## 2024-05-13 DIAGNOSIS — R73.9 HYPERGLYCEMIA: ICD-10-CM

## 2024-05-13 DIAGNOSIS — R55 SYNCOPE AND COLLAPSE: ICD-10-CM

## 2024-05-13 DIAGNOSIS — Z02.9 ENCOUNTERS FOR UNSPECIFIED ADMINISTRATIVE PURPOSE: Primary | ICD-10-CM

## 2024-05-13 PROCEDURE — 1111F DSCHRG MED/CURRENT MED MERGE: CPT

## 2024-05-13 NOTE — PROGRESS NOTES
Patient needs assistance  appointment, and MRI  Patient is Tajik speaking.     Follow up with Bella Monte in 1 month(s)  Specialty: NEUROLOGY  [facility]  [address]  [facility] [address]  [facility] [address]  [phone]    MRA BRAIN (CPT=70544)  Complete by: 05/11/24 (Approximate)  Order Questions:  Release to patient: Immediate  Scheduling Instructions: Your order will generate a \"Scheduling Ticket\" that will be available in [facility] to  schedule on your own at a time most convenient to you. To ensure you receive your test in a timely manner,  STAT orders will not generate a ticket and must be scheduled by calling the Central Scheduling department.  Your physician has ordered a radiology test that may require authorization from your insurance company. Your  physician or the clinic staff will work with your insurance company to obtain this authorization for your ordered  radiology test.  If you do not have a [facility] Account, or if you prefer to speak with someone to schedule your appointment,  please call [facility] Scheduling at [phone].  MRA CAROTIDS (W+WO) (CPT=70549)  Complete by: 05/11/24 (Approximate)  Order Questions:  Release to patient: Immediate  Scheduling Instructions: Your order will generate a \"Scheduling Ticket\" that will be available in [facility] to  schedule on your own at a time most convenient to you. To ensure you receive your test in a timely manner,  STAT orders will not generate a ticket and must be scheduled by calling the Central Scheduling department.  Your physician has ordered a radiology test that may require authorization from your insurance company. Your  physician or the clinic staff will work with your insurance company to obtain this authorization for your ordered  radiology test.  If you do not have a [facility] Account, or if you prefer to speak with someone to schedule your appointment,  please call [facility]  Scheduling at [phone].  MRI BRAIN (W+WO) (CPT=70553)  Complete by: 05/11/24 (Approximate)  Order Questions:  Release to patient: Immediate  Scheduling Instructions: Your order will generate a \"Scheduling Ticket\" that will be available in [facility] to  schedule on your own at a time most convenient to you. To ensure you receive your test in a timely manner,  STAT orders will not generate a ticket and must be scheduled by calling the Central Scheduling department.  Your physician has ordered a radiology test that may require authorization from your insurance company. Your  physician or the clinic staff will work with your insurance company to obtain this authorization for your ordered  radiology test.  If you do not have a [facility] Account, or if you prefer to speak with someone to schedule your appointment,  please call [facility] Scheduling at [phone].  MRI BRAIN(W+WO)/MRA BRAIN/CAROTIDS(W+WO) (CPT=70553/89232/89383)  Complete by: 05/11/24 (Approximate)    Thank you!

## 2024-05-13 NOTE — PROGRESS NOTES
Initial Post Discharge Follow Up   Discharge Date: 5/11/24  Contact Date: 5/13/2024    NCM placed TCM call to patient with the assistance of a : Marlyn # [id number]     Consent Verification:  Assessment Completed With: Patient  HIPAA Verified?  Yes    Discharge Dx:   (Copied from Discharge summary)  Principal Problem:    Syncope and Collapse  Active Problems:    Obstructive sleep apnea syndrome    High cholesterol    Hyperglycemia    General:   How have you been since your discharge from the hospital? Patient states she feels good. Patient denies fever/chills, no shortness of breath, no coughing, no palpitations, no chest pain, no pain radiating from chest to neck, jaw, shoulders, arms or upper back. Patient denies abdominal pain no nausea/vomiting. Patient denies feeling faint, no dizziness or lightheadedness. NCM reviewed attachments with patient. NCM instructed patient to change positions frequently, walk as tolerated, rest when needed, stay hydrated and continue to take up to ten deep breaths an hour while awake, or if watching television take a deep breath with every commercial. NCM reviewed discharge instructions, medications, S&S of infection/blood clots with patient, she verbalized understanding of these. Patient denies any further questions or needs at this time.  Do you have any pain since discharge?  No    When you were leaving the hospital were your discharge instructions reviewed with you? Yes  How well were your discharge instructions explained to you?   On a scale of 1-5   1- Very Poor and 5- Very well   Very Well  Do you have any questions about your discharge instructions?  No  Before leaving the hospital was your diagnoses explained to you? Yes  Do you have any questions about your diagnoses? No  Are you able to perform normal daily activities of living as you have prior to your hospital stay (dressing, bathing, ambulating to the bathroom, etc)? yes  (NCM) Was patient given a  different diet per AVS? no      Medications:   CHANGE how you take:  albuterol (ProAir HFA)  celecoxib (CeleBREX)  diazePAM (Valium)  montelukast (Singulair)  STOP taking:  Betamethasone Dipropionate Aug 0.05 % Crea (DIPROLENE-AF)  Betamethasone Dipropionate Aug 0.05 % Lotn (DIPROLENE)  cyclobenzaprine 5 MG Tabs (Flexeril)  flunisolide 25 MCG/ACT (0.025%) Soln  gabapentin 300 MG Caps (Neurontin)  ASK how to take:  betamethasone dipropionate 0.05 % Crea (Diprosone)  Review your updated medication list below.  Current Outpatient Medications   Medication Sig Dispense Refill    ketoconazole 2 % External Shampoo Apply 1 mL topically twice a week. 120 mL 1    Amitriptyline HCl 100 MG Oral Tab Take 1 tablet (100 mg total) by mouth nightly. 90 tablet 1    montelukast 10 MG Oral Tab Take 1 tablet (10 mg total) by mouth nightly as needed. 90 tablet 3    diazePAM (VALIUM) 2 MG Oral Tab Take 1 tablet (2 mg total) by mouth every 8 (eight) hours as needed (vertigo). 30 tablet 0    meclizine 25 MG Oral Tab Take 1 tablet (25 mg total) by mouth 3 (three) times daily as needed. 15 tablet 0    clotrimazole-betamethasone 1-0.05 % External Cream APPLY 1 APPLICATION TOPICALLY EVERY 12 HOURS 60 g 0    celecoxib 100 MG Oral Cap Take 1 capsule (100 mg total) by mouth 2 (two) times daily. 180 capsule 1    hydrocortisone (PROCTO-MED HC) 2.5 % External Cream Place 1 Application rectally 2 (two) times daily. 28 g 1    betamethasone dipropionate 0.05 % External Cream Apply 1 Application  topically 2 (two) times daily. APPLY TO AFFECTED AREA (Patient not taking: Reported on 3/13/2024) 45 g 3    clobetasol 0.05 % External Cream Apply 1 Application  topically 2 (two) times daily. 45 g 0    albuterol (PROAIR HFA) 108 (90 Base) MCG/ACT Inhalation Aero Soln Inhale 2 puffs into the lungs every 4 (four) hours as needed for Wheezing. 1 each 0    pantoprazole 40 MG Oral Tab EC Take 1 tablet (40 mg total) by mouth before breakfast. 90 tablet 3    ALREX 0.2  % Ophthalmic Suspension        Were there any changes to your current medication(s) noted on the AVS? Yes  If so, were these medication changes discussed with you prior to leaving the hospital? Yes  Let's go over your medications together to make sure we are not missing anything. Medications Reviewed  Are there any reasons that keep you from taking your medication as prescribed? No  Are you having any concerns with constipation? No      Discharge medications reviewed/discussed/and reconciled against outpatient medications with patient.  Any changes or updates to medications sent to PCP.  Patient Acknowledged     Referrals/orders at D/C:  Referrals/orders placed at D/C? no    DME ordered at D/C? No      Discharge orders, AVS reviewed and discussed with patient. Any changes or updates to orders sent to PCP.  Patient Acknowledged    SDOH:   Transportation:    Transportation Needs: No Transportation Needs (5/9/2024)    Transportation Needs     Lack of Transportation: No     Car Seat: Not on file       Financial Strain:    Financial Resource Strain: Low Risk  (5/13/2024)    Financial Resource Strain     Difficulty of Paying Living Expenses: Not on file     Med Affordability: No       Diagnosis specifics:     Fainting: Vagal Reaction  Follow up with your healthcare provider, or as advised.    Call 911 if any of the following occur:  Another fainting spell that’s not explained by the common causes listed above  Pain in your chest, arm, neck, jaw, back, or abdomen  Shortness of breath  Severe headache or seizure  Your heart beats very rapidly, very slowly, or irregularly (palpitations)    Treatment for Vasovagal Syndrome  To help reduce the risk of fainting, try to avoid triggers such as:  Standing for long periods  Getting too hot  Exercising for a long time  Feeling intense emotion, such as fear  Feeling intense pain  Seeing blood or a needle    Watch for the warning signs of vasovagal syncope. These can  include:  Nausea  Warm, flushed feeling  Face that turns pale  Sweaty palms  Dizziness  Blurred vision  If you think you are about to faint, try one or more of these tips:  Lie down right away.  Prop your feet up so that they are higher than your head.  Tense up your arms.  Cross your legs.  If you faint, once you regain consciousness, rest for a little while before getting up and moving around again.    Call your healthcare provider if you have fainting that occurs more often or if you sustain significant injury from your fainting spell.  Unexplained syncope or fainting, especially in older people, can actually be signs of a serious life-threatening condition such as a heart attack.     Call 911 or seek medical care right away if the cause of syncope is not known.    Don't drive yourself to the emergency department if you have had a syncopal episode. This is to prevent injury to yourself  or other passengers or drivers if another episode occurs while you are driving.    Follow up appointments:      Your appointments       Date & Time Appointment Department ([place])    May 24, 2024 9:00 AM CDT [facility]        Jun 14, 2024 8:30 AM CDT Follow Up Visit with Stefani Argueta MD [facility] [place] [facility] ([facility])        Nov 29, 2024 8:30 AM CST Medicare Annual Well Visit with Stefani Argueta MD [facility] [place] [facility] Amezquita ([facility])              [facility] [place] Jayashree Amezquita  [facility]  [address] [place] [address]  [phone]            TCC  Was TCC ordered: No      PCP (If no TCC appointment)  Does patient already have a PCP appointment scheduled? Yes  NCM Confirmed PCP office appointment with patient on 5/24/2024 at 9:00 am. NCM changed visit type to  TCM.       Specialist    Does the patient have any other follow up appointment(s) needing to be scheduled? Yes  If yes: NCM reviewed upcoming specialist appointment with patient: Yes  Does the patient need assistance scheduling appointment(s): Yes, message sent to TST team, with request to assist in scheduling appointment with kimmy Amezquita within 1 month from discharge date. Patient reports that she is going to follow up with Dr Banks's office on 5/14/24 for them to attach the mobile cardiac monitor.  Future Appointments   Date Time Provider Department Center   5/24/2024  9:00 AM Stefani Argueta MD [facility] [place]   6/14/2024  8:30 AM Stefani Argueta MD [facility] [place]   11/29/2024  8:30 AM Stefani Argueta MD [facility] [place]       Is there any reason as to why you cannot make your appointment(s)?  No     Needs post D/C:   Now that you are home, are there any needs or concerns you need addressed before your next visit with your PCP?  (DME, meds, questions, etc.): No    Interventions by NCM:   NCM reviewed medications, discharge instructions, S&S of infection/blood clots. Patient instructed to report any new or worsening symptoms, when to call the doctor and when to call 911. Patient verbalized understanding of these. NCM instructed patient to call PCP with any questions or needs, she states she will.      CCM referral placed:    Yes    BOOK BY DATE: 5/25/2024

## 2024-05-14 ENCOUNTER — TELEPHONE (OUTPATIENT)
Dept: NEUROLOGY | Facility: CLINIC | Age: 74
End: 2024-05-14

## 2024-05-14 ENCOUNTER — TELEPHONE (OUTPATIENT)
Dept: INTERNAL MEDICINE CLINIC | Facility: CLINIC | Age: 74
End: 2024-05-14

## 2024-05-14 ENCOUNTER — HOSPITAL ENCOUNTER (OUTPATIENT)
Dept: MRI IMAGING | Facility: HOSPITAL | Age: 74
Discharge: HOME | End: 2024-05-14
Attending: Other

## 2024-05-14 DIAGNOSIS — R55 SYNCOPE AND COLLAPSE: ICD-10-CM

## 2024-05-14 DIAGNOSIS — M54.12 CERVICAL RADICULOPATHY: Primary | ICD-10-CM

## 2024-05-14 PROCEDURE — 70553 MRI BRAIN STEM W/O & W/DYE: CPT | Performed by: OTHER

## 2024-05-14 PROCEDURE — A9575 INJ GADOTERATE MEGLUMI 0.1ML: HCPCS | Performed by: OTHER

## 2024-05-14 RX ORDER — GADOTERATE MEGLUMINE 376.9 MG/ML
20 INJECTION INTRAVENOUS
Status: COMPLETED | OUTPATIENT
Start: 2024-05-14 | End: 2024-05-14

## 2024-05-14 RX ORDER — DIAZEPAM 10 MG/1
TABLET ORAL
Qty: 2 TABLET | Refills: 0 | Status: SHIPPED | OUTPATIENT
Start: 2024-05-14

## 2024-05-14 RX ORDER — LORAZEPAM 0.5 MG/1
TABLET ORAL
Qty: 2 TABLET | Refills: 0 | Status: CANCELLED | OUTPATIENT
Start: 2024-05-14

## 2024-05-14 RX ADMIN — GADOTERATE MEGLUMINE 19 ML: 376.9 INJECTION INTRAVENOUS at 12:51:00

## 2024-05-14 NOTE — TELEPHONE ENCOUNTER
Noted.  Attempted to reach diagnostics to see if the MRI has been started and there was no answer.  Reviewed the chart and it states Exam has begun as of 12:32pm.  Detailed message has been left on the patients son's voicemail.    Request for oral sedation was not sent in an appropriate time frame so that the medication could be ordered and ingested.  Reviewed and electronically signed by: LASHAUN Pearl

## 2024-05-14 NOTE — TELEPHONE ENCOUNTER
Son of patient calling to see if medication has been sent.  is not in the office until 12:30. Son is going to contact Martell's office, who ordered the MRI.

## 2024-05-14 NOTE — TELEPHONE ENCOUNTER
See prescription if they have not been able to contact Dr. Monte's office.  And send prescription to pharmacy.

## 2024-05-14 NOTE — TELEPHONE ENCOUNTER
Diagnostics returned the call, and the MRI has been completed without any issues.  Reviewed and electronically signed by: LASHAUN Pearl

## 2024-05-14 NOTE — TELEPHONE ENCOUNTER
Pharmacy calling states per Patient, Dr. Argueta is supposed to send a medication for sedation for her MRI scheduled today. Per pharmacy, they have not received medication.  Chart reviewed. MRI was ordered by Dr. Monte. Noted request already sent to neurology staff.  Patient recently discharged from [facility].  Please advise.

## 2024-05-14 NOTE — TELEPHONE ENCOUNTER
Per provider message, please make sure that future sedative requests for imaging are requested 5-7 days in advance.

## 2024-05-14 NOTE — TELEPHONE ENCOUNTER
Pt son called in advised rx doesn't have anything f/ up for pt for oral sedation needed today. Pls advise

## 2024-05-14 NOTE — TELEPHONE ENCOUNTER
Valium has been ordered by Dr. Carbajal.    Noted.  Attempted to reach diagnostics to see if the MRI has been started and there was no answer.  Reviewed the chart and it states Exam has begun as of 12:32pm.  Detailed message has been left on the patients son's voicemail.     Request for oral sedation was not sent in an appropriate time frame so that the medication could be ordered and ingested.  Reviewed and electronically signed by: LASHAUN Pearl

## 2024-05-14 NOTE — TELEPHONE ENCOUNTER
Inga from central scheduling called in. Pt has stat order from dr marr. Advised they need prescription for the oral sedation today at 11:30. Pls advise

## 2024-05-15 ENCOUNTER — HOSPITAL ENCOUNTER (OUTPATIENT)
Dept: MRI IMAGING | Facility: HOSPITAL | Age: 74
Discharge: HOME | End: 2024-05-15
Attending: Other

## 2024-05-15 DIAGNOSIS — R55 SYNCOPE AND COLLAPSE: ICD-10-CM

## 2024-05-15 PROCEDURE — A9575 INJ GADOTERATE MEGLUMI 0.1ML: HCPCS

## 2024-05-15 PROCEDURE — 70544 MR ANGIOGRAPHY HEAD W/O DYE: CPT | Performed by: OTHER

## 2024-05-15 PROCEDURE — 70549 MR ANGIOGRAPH NECK W/O&W/DYE: CPT | Performed by: OTHER

## 2024-05-15 NOTE — PROGRESS NOTES
TCM request    Dr Bella Monte  NEUROLOGY  [facility] [address] [address] [place] [id number]  [phone]  Follow up 1 month  LVM w/pt sonTien to call [phone] to assist w/scheduling apt

## 2024-05-15 NOTE — PROGRESS NOTES
TCM request (discharged 05/11)     Dr Bella Monte  NEUROLOGY  [facility] [address] [address]  [phone]  Follow up 1 month  Multiple attempts w/no calls back; no apt made  Closing encounter

## 2024-05-16 ENCOUNTER — TELEPHONE (OUTPATIENT)
Dept: NEUROLOGY | Facility: CLINIC | Age: 74
End: 2024-05-16

## 2024-05-16 ENCOUNTER — TELEPHONE (OUTPATIENT)
Dept: INTERNAL MEDICINE CLINIC | Facility: CLINIC | Age: 74
End: 2024-05-16

## 2024-05-16 DIAGNOSIS — R42 DIZZINESS: Primary | ICD-10-CM

## 2024-05-16 DIAGNOSIS — I67.1 CEREBRAL ANEURYSM (HCC): Primary | ICD-10-CM

## 2024-05-16 NOTE — TELEPHONE ENCOUNTER
Pt son called in advised mri has been completed and looking for clinical or dr marr to review results. Did set up an appt but until August 1st 2024 to be able to view results and have been added to the wait list. Pt son wants to know if possible to come in sooner its almost 3 months to await mri results. Pls advise.

## 2024-05-16 NOTE — TELEPHONE ENCOUNTER
Dr Argueta, please advise    Patient (name and  verified) is asking if she can continue physical therapy for her ongoing dizziness.     Referral pended for review/approval.

## 2024-05-21 NOTE — TELEPHONE ENCOUNTER
Phone call returned to pt son, Tien. Advised Tien on Dr. Monte's review of the pt MRI. Tien verbalized understanding and appreciates call.

## 2024-05-24 ENCOUNTER — OFFICE VISIT (OUTPATIENT)
Dept: INTERNAL MEDICINE CLINIC | Facility: CLINIC | Age: 74
End: 2024-05-24

## 2024-05-24 VITALS
DIASTOLIC BLOOD PRESSURE: 55 MMHG | SYSTOLIC BLOOD PRESSURE: 128 MMHG | HEIGHT: 63 IN | HEART RATE: 72 BPM | OXYGEN SATURATION: 100 % | WEIGHT: 213.63 LBS | BODY MASS INDEX: 37.85 KG/M2 | TEMPERATURE: 98 F

## 2024-05-24 DIAGNOSIS — G47.33 OBSTRUCTIVE SLEEP APNEA SYNDROME: ICD-10-CM

## 2024-05-24 DIAGNOSIS — F41.9 ANXIETY: ICD-10-CM

## 2024-05-24 DIAGNOSIS — R55 SYNCOPE AND COLLAPSE: ICD-10-CM

## 2024-05-24 DIAGNOSIS — I72.9 ANEURYSM (HCC): ICD-10-CM

## 2024-05-24 DIAGNOSIS — Z12.31 BREAST CANCER SCREENING BY MAMMOGRAM: ICD-10-CM

## 2024-05-24 DIAGNOSIS — E55.9 VITAMIN D DEFICIENCY, UNSPECIFIED: Primary | ICD-10-CM

## 2024-05-24 RX ORDER — KETOCONAZOLE 20 MG/ML
1 SHAMPOO TOPICAL
Qty: 120 ML | Refills: 1 | Status: SHIPPED | OUTPATIENT
Start: 2024-05-27

## 2024-05-24 NOTE — PATIENT INSTRUCTIONS
ASSESSMENT/PLAN:     Encounter Diagnoses   Name Primary?    Vitamin D deficiency, unspecified Check blood.    Yes    Syncope and collapse ? Etiology. FU neurology and neuroSx. Has holter.        Obstructive sleep apnea syndrome Complaint with use.        Anxiety Thao Rules for Coping with Panic      1) Remember that although your feelings and symptoms are frightening, they are neither dangerous nor harmful.  2) Understand that what you are experiencing is merely an exaggeration of your normal reactions to stress.  3) Do not fight your feelings or try to wish them away. The more willing you are to face them, the less intense they will become.  4) Don't add to your panic by thinking about what might happen. If you finding yourself asking, 'What if?' tell yourself, 'So what!'  5) Stay in the present. Be aware of what is happening to you rather than concern yourself with  how much worse it might get.  6) Label your fear level from zero to 10 and watch it go up and down. Notice that it doesn't stay at a very high level for more than a few seconds.  7) When you find yourself thinking about fear, change your what if thinking. Focus on and perform some simple, manageable task.  8) Notice that when you stop thinking frightening thoughts, your anxiety fades.  9) When fear comes, accept it, don't fight it.  Wait and give it some time to pass. Don't try to escape from it.  10)  Be proud of the progress you've made. Think about how good you will feel when the anxiety has passed and you are in total control and at peace. Counsleor.        Aneurysm (HCC) FU neuroSx.        Breast cancer screening by mammogram CHeck mammogram. Continue self breast exam every month.        Eatting. Dietician.     No orders of the defined types were placed in this encounter.      Meds This Visit:  Requested Prescriptions     Signed Prescriptions Disp Refills    ketoconazole 2 % External Shampoo 120 mL 1     Sig: Apply 1 mL topically twice a week.        Imaging & Referrals:   NAVIGATOR  DIETITIAN EDUCATION INITIAL, DIET (INTERNAL)  NEUROSURGERY - INTERNAL  [facility] SHANNON 2D+3D SCREENING BILAT (CPT=77067/41472)      Has set justice't.

## 2024-05-24 NOTE — PROGRESS NOTES
HPI:    Patient ID: Citlalli Najera is a 73 year old female.  Date of Admission: 5/9/2024       Date of Discharge: 5/11/2024     Admitting Diagnosis: Syncope and collapse [R55]     Disposition: Home     Discharge Diagnosis: .Principal Problem:    Syncope and collapse  Active Problems:    Obstructive sleep apnea syndrome    High cholesterol    Hyperglycemia        Hospital Course:   Reason for Admission: Syncope     Hospital Course: History copied from admission summary by Dr. Argueta         Syncope      Workup this morning at 8 AM and after getting out of bed had a syncopal episode no prodromal symptoms per patient.  No chest pain no palpitations no feeling of dizziness no nausea no vomiting no double vision blurry vision no headache no numbness tingling no significant pain.  Does not know how long she was out.  Unwitnessed event.  She woke up between the dresser and her bed.  Then she ate breakfast like she normally is after preparing it.  She dropped off her dogs at the [employer] for dog grooming.  On the way back try she felt vertigo symptoms are typical vertigo.  Lasted for few minutes and went away.  After 1 hour she went back to  her dogs from the dog only place.  In the place when she went to pay money she had another syncopal episode.  This was witnessed by  the .  Denies any bowel or bladder incontinence.  Denies any shaking episodes.  Denies any postictal phase.  Denies any tongue biting the first time.  No previous history of seizures.  Has cardiac monitor.  Will be following up with cardiology.  Troponin 5     CT head-no acute findings  CXR-no acute findings  ECG-sinus rhythm, 65 bpm     Syncope-neurology cleared for discharge by doing MRI MRI as outpatient.  Cardiology cleared patient for discharge as well.     Hypertension  Patient is here for follow up of hypertension. BP at home: nit check.   Has been compliant with medications.  Exercise level: exercises 7 times a  week (walks 2  milees a  day) and has been following low salt diet.  Weight has been stable.  Loves to eat sweets per son.  Wt Readings from Last 3 Encounters:   05/24/24 213 lb 9.6 oz (96.9 kg)   05/11/24 208 lb (94.3 kg)   03/22/24 211 lb 12.8 oz (96.1 kg)     BP Readings from Last 3 Encounters:   05/24/24 128/55   05/11/24 135/70   03/22/24 135/63     Labs:   Lab Results   Component Value Date/Time    GLU 94 05/10/2024 07:20 AM     05/10/2024 07:20 AM    K 3.9 05/10/2024 07:20 AM     05/10/2024 07:20 AM    CO2 29.0 05/10/2024 07:20 AM    CREATSERUM 0.79 05/10/2024 07:20 AM    CA 9.1 05/10/2024 07:20 AM    AST 35 (H) 05/10/2024 07:20 AM    ALT 45 05/10/2024 07:20 AM    TSH 0.730 09/29/2022 11:40 AM        Lab Results   Component Value Date/Time    CHOLEST 187 05/09/2024 11:47 AM    HDL 60 (H) 05/09/2024 11:47 AM    TRIG 208 (H) 05/09/2024 11:47 AM    LDL 92 05/09/2024 11:47 AM    NONHDLC 127 05/09/2024 11:47 AM            Wt Readings from Last 3 Encounters:   05/24/24 213 lb 9.6 oz (96.9 kg)   05/11/24 208 lb (94.3 kg)   03/22/24 211 lb 12.8 oz (96.1 kg)     BP Readings from Last 3 Encounters:   05/24/24 128/55   05/11/24 135/70   03/22/24 135/63     Labs:   Lab Results   Component Value Date/Time    GLU 94 05/10/2024 07:20 AM     05/10/2024 07:20 AM    K 3.9 05/10/2024 07:20 AM     05/10/2024 07:20 AM    CO2 29.0 05/10/2024 07:20 AM    CREATSERUM 0.79 05/10/2024 07:20 AM    CA 9.1 05/10/2024 07:20 AM    AST 35 (H) 05/10/2024 07:20 AM    ALT 45 05/10/2024 07:20 AM    TSH 0.730 09/29/2022 11:40 AM        Lab Results   Component Value Date/Time    CHOLEST 187 05/09/2024 11:47 AM    HDL 60 (H) 05/09/2024 11:47 AM    TRIG 208 (H) 05/09/2024 11:47 AM    LDL 92 05/09/2024 11:47 AM    NONHDLC 127 05/09/2024 11:47 AM          Oldest son in room with patient h patient permission.  Complete history and physical done with son present per patient request.    Here for follow-up from hospital.  MRI done showed aneurysm.    Son  requesting referral to dietitian to help mom.  She likes eating sweets more than a meal.  When she is stressed she will eat more i.e. sweets.    Anxiety  This is a chronic problem. The problem has been gradually worsening. Pertinent negatives include no abdominal pain, chest pain, chills, congestion, coughing, diaphoresis, fatigue, fever, headaches, nausea, numbness, rash, sore throat, swollen glands, vertigo, visual change or weakness. Associated symptoms comments: The vertigo that she had per patient is completely gone with physical therapy.. The symptoms are aggravated by stress.         Review of Systems   Constitutional: Negative.  Negative for activity change, appetite change, chills, diaphoresis, fatigue, fever and unexpected weight change.   HENT:  Negative for congestion, dental problem, drooling, ear discharge, ear pain, facial swelling, mouth sores, nosebleeds, postnasal drip, rhinorrhea, sinus pressure, sneezing, sore throat, trouble swallowing and voice change.    Eyes:  Negative for photophobia, pain, discharge, redness, itching and visual disturbance.   Respiratory: Negative.  Negative for apnea, cough, choking, chest tightness, shortness of breath, wheezing and stridor.    Cardiovascular: Negative.  Negative for chest pain, palpitations and leg swelling.   Gastrointestinal:  Negative for abdominal distention, abdominal pain and nausea.   Endocrine: Negative for cold intolerance, heat intolerance, polydipsia, polyphagia and polyuria.   Skin:  Negative for rash.   Neurological:  Negative for dizziness, vertigo, tremors, seizures, syncope, facial asymmetry, speech difficulty, weakness, light-headedness, numbness and headaches.   Hematological:  Negative for adenopathy.   Psychiatric/Behavioral:  Positive for agitation, decreased concentration, dysphoric mood and sleep disturbance. Negative for behavioral problems, confusion, hallucinations, self-injury and suicidal ideas. The patient is nervous/anxious.  The patient is not hyperactive.    All other systems reviewed and are negative.        Current Outpatient Medications   Medication Sig Dispense Refill    ketoconazole 2 % External Shampoo Apply 1 mL topically twice a week. 120 mL 1    diazePAM 10 MG Oral Tab 1-2 pills 15 min before MRI 2 tablet 0    Amitriptyline HCl 100 MG Oral Tab Take 1 tablet (100 mg total) by mouth nightly. 90 tablet 1    montelukast 10 MG Oral Tab Take 1 tablet (10 mg total) by mouth nightly as needed. 90 tablet 3    diazePAM (VALIUM) 2 MG Oral Tab Take 1 tablet (2 mg total) by mouth every 8 (eight) hours as needed (vertigo). 30 tablet 0    meclizine 25 MG Oral Tab Take 1 tablet (25 mg total) by mouth 3 (three) times daily as needed. 15 tablet 0    clotrimazole-betamethasone 1-0.05 % External Cream APPLY 1 APPLICATION TOPICALLY EVERY 12 HOURS 60 g 0    hydrocortisone (PROCTO-MED HC) 2.5 % External Cream Place 1 Application rectally 2 (two) times daily. 28 g 1    clobetasol 0.05 % External Cream Apply 1 Application  topically 2 (two) times daily. 45 g 0    albuterol (PROAIR HFA) 108 (90 Base) MCG/ACT Inhalation Aero Soln Inhale 2 puffs into the lungs every 4 (four) hours as needed for Wheezing. 1 each 0    pantoprazole 40 MG Oral Tab EC Take 1 tablet (40 mg total) by mouth before breakfast. 90 tablet 3    ALREX 0.2 % Ophthalmic Suspension       celecoxib 100 MG Oral Cap Take 1 capsule (100 mg total) by mouth 2 (two) times daily. (Patient not taking: Reported on 5/13/2024) 180 capsule 1    betamethasone dipropionate 0.05 % External Cream Apply 1 Application  topically 2 (two) times daily. APPLY TO AFFECTED AREA (Patient not taking: Reported on 3/13/2024) 45 g 3     Allergies:  Allergies   Allergen Reactions    Corn OTHER (SEE COMMENTS)     Allergy Test Screening    Dust Mite Mixed Allergen Ext [Mite (D. Farinae)] OTHER (SEE COMMENTS)     Allergy Test Screening    Fish OTHER (SEE COMMENTS)     Allergy Test Screening    Fish-Derived Products OTHER  (SEE COMMENTS)     Allergy Test Screening    Other OTHER (SEE COMMENTS)     Allergy Test Screening - Cockroach    Shrimp OTHER (SEE COMMENTS)     Allergy Test Screening       HISTORY:  Past Medical History:    Acid reflux disease    Breast mass in female    Difficulty sleeping    Elevated liver enzymes    Fatty liver disease, nonalcoholic    Pneumonia due to organism    Postmenopausal bleeding    Prediabetes    Submucous uterine fibroid    Vaccine counseling    Visual impairment    glasses      Past Surgical History:   Procedure Laterality Date    Breast surgery Right 2000    Removed Mass from breast two times Benign.     Colonoscopy  10/2018    Colonoscopy N/A 10/18/2018    Procedure: COLONOSCOPY, POSSIBLE BIOPSY, POSSIBLE POLYPECTOMY 27510;  Surgeon: Willis Nelson MD;  [facility] [facility] localization wire 1 site left (cpt=19281)      Needle biopsy left        Family History   Problem Relation Age of Onset    Diabetes Mother       Social History:   Social History     Socioeconomic History    Marital status:    Occupational History    Occupation: Retired from [employer].    Tobacco Use    Smoking status: Former     Current packs/day: 0.00     Average packs/day: 1 pack/day for 40.0 years (40.0 ttl pk-yrs)     Types: Cigarettes     Start date:      Quit date:      Years since quittin.4    Smokeless tobacco: Never   Vaping Use    Vaping status: Never Used   Substance and Sexual Activity    Alcohol use: No    Drug use: No   Social History Narrative      5 months ago.      Social Determinants of Health     Financial Resource Strain: Low Risk  (2024)    Financial Resource Strain     Med Affordability: No   Food Insecurity: No Food Insecurity (2024)    Food Insecurity     Food Insecurity: Never true   Transportation Needs: No Transportation Needs (2024)    Transportation Needs     Lack of Transportation: No   Housing Stability: Low Risk   (5/9/2024)    Housing Stability     Housing Instability: No        PHYSICAL EXAM:   /55 (BP Location: Left arm, Patient Position: Sitting, Cuff Size: large)   Pulse 72   Temp 98.3 °F (36.8 °C) (Oral)   Ht 5' 3\" (1.6 m)   Wt 213 lb 9.6 oz (96.9 kg)   SpO2 100%   BMI 37.84 kg/m²   BP Readings from Last 3 Encounters:   05/24/24 128/55   05/11/24 135/70   03/22/24 135/63     Wt Readings from Last 3 Encounters:   05/24/24 213 lb 9.6 oz (96.9 kg)   05/11/24 208 lb (94.3 kg)   03/22/24 211 lb 12.8 oz (96.1 kg)       Physical Exam  Vitals and nursing note reviewed.   Constitutional:       General: She is not in acute distress.     Appearance: Normal appearance. She is well-developed. She is not ill-appearing, toxic-appearing or diaphoretic.      Interventions: She is not intubated.  Neck:      Thyroid: No thyroid mass or thyromegaly.      Trachea: Trachea and phonation normal.   Cardiovascular:      Rate and Rhythm: Normal rate and regular rhythm.      Pulses: Normal pulses. No decreased pulses.           Carotid pulses are 2+ on the right side and 2+ on the left side.       Radial pulses are 2+ on the right side and 2+ on the left side.        Dorsalis pedis pulses are 2+ on the right side and 2+ on the left side.        Posterior tibial pulses are 2+ on the right side and 2+ on the left side.      Heart sounds: Normal heart sounds, S1 normal and S2 normal.   Pulmonary:      Effort: Pulmonary effort is normal. No tachypnea, bradypnea, accessory muscle usage, prolonged expiration, respiratory distress or retractions. She is not intubated.      Breath sounds: Normal breath sounds and air entry. No stridor, decreased air movement or transmitted upper airway sounds. No decreased breath sounds, wheezing, rhonchi or rales.   Chest:      Chest wall: No tenderness.   Abdominal:      General: There is no distension.      Palpations: Abdomen is soft.      Tenderness: There is no abdominal tenderness.   Musculoskeletal:       Right lower leg: No edema.      Left lower leg: No edema.   Skin:     General: Skin is warm and dry.   Neurological:      Mental Status: She is alert and oriented to person, place, and time.   Psychiatric:         Attention and Perception: She is attentive. She does not perceive auditory or visual hallucinations.         Mood and Affect: Mood is anxious. Mood is not depressed or elated. Affect is not labile, blunt, flat, angry, tearful or inappropriate.         Speech: She is communicative. Speech is rapid and pressured. Speech is not delayed, slurred or tangential.         Behavior: Behavior normal. Behavior is not agitated, slowed, aggressive, withdrawn, hyperactive or combative. Behavior is cooperative.         Thought Content: Thought content normal. Thought content is not paranoid or delusional. Thought content does not include homicidal or suicidal ideation. Thought content does not include homicidal or suicidal plan.              ASSESSMENT/PLAN:     Encounter Diagnoses   Name Primary?    Vitamin D deficiency, unspecified Check blood.    Yes    Syncope and collapse ? Etiology. FU neurology and neuroSx. Has holter.        Obstructive sleep apnea syndrome Complaint with use.        Anxiety Thao Rules for Coping with Panic      1) Remember that although your feelings and symptoms are frightening, they are neither dangerous nor harmful.  2) Understand that what you are experiencing is merely an exaggeration of your normal reactions to stress.  3) Do not fight your feelings or try to wish them away. The more willing you are to face them, the less intense they will become.  4) Don't add to your panic by thinking about what might happen. If you finding yourself asking, 'What if?' tell yourself, 'So what!'  5) Stay in the present. Be aware of what is happening to you rather than concern yourself with  how much worse it might get.  6) Label your fear level from zero to 10 and watch it go up and down. Notice  that it doesn't stay at a very high level for more than a few seconds.  7) When you find yourself thinking about fear, change your what if thinking. Focus on and perform some simple, manageable task.  8) Notice that when you stop thinking frightening thoughts, your anxiety fades.  9) When fear comes, accept it, don't fight it.  Wait and give it some time to pass. Don't try to escape from it.  10)  Be proud of the progress you've made. Think about how good you will feel when the anxiety has passed and you are in total control and at peace. Counsleor.        Aneurysm (HCC) FU neuroSx.        Breast cancer screening by mammogram Check mammogram. Continue self breast exam every month.        Eatting. Dietician.     No orders of the defined types were placed in this encounter.      Meds This Visit:  Requested Prescriptions     Signed Prescriptions Disp Refills    ketoconazole 2 % External Shampoo 120 mL 1     Sig: Apply 1 mL topically twice a week.       Imaging & Referrals:   NAVIGATOR  DIETITIAN EDUCATION INITIAL, DIET (INTERNAL)  NEUROSURGERY - INTERNAL  [facility] SHANNON 2D+3D SCREENING BILAT (CPT=77067/28502)      Has set justice't.

## 2024-05-30 ENCOUNTER — TELEPHONE (OUTPATIENT)
Age: 74
End: 2024-05-30

## 2024-05-30 ENCOUNTER — TELEPHONE (OUTPATIENT)
Dept: INTERNAL MEDICINE CLINIC | Facility: CLINIC | Age: 74
End: 2024-05-30

## 2024-05-30 NOTE — TELEPHONE ENCOUNTER
Lamin Willard, Therapist   [address]  [facility] [address]  Phone: [phone]    Divya Harper, Therapist   [address] [facility] [address]  Phone: [phone]    Jade Loaiza, Therapist   [address]  [facility] [address]  Phone: [phone]    Intake Department, [facility]  [address]  [facility] [address]  Phone: [phone]

## 2024-05-30 NOTE — TELEPHONE ENCOUNTER
E: Renny Hawthorne Navigator Order  Received: Monie Moore LSW sent to Stefani Argueta MD  Good Afternoon,    On 5/30/2024, the following referrals for therapy were provided to the patient:    Lamin Willard, Therapist  [address]  [facility] [address]  Phone: [phone]    Divya Harper, Therapist  [address] [facility] [address]  Phone: [phone]    Jade Loaiza, Therapist  [address]  [facility] [address]  Phone: [phone]    Intake Department, [facility]  [address]  [facility] [address]  Phone: [phone]    I have provided my contact information if additional resources are needed.    I am closing the order at this time. Please feel free to re-refer the patient for navigation as needed.      Thank You,  GLENDA Allen, [facility]  Behavioral Health Navigator

## 2024-06-14 ENCOUNTER — TELEPHONE (OUTPATIENT)
Dept: INTERNAL MEDICINE CLINIC | Facility: CLINIC | Age: 74
End: 2024-06-14

## 2024-06-14 ENCOUNTER — OFFICE VISIT (OUTPATIENT)
Dept: INTERNAL MEDICINE CLINIC | Facility: CLINIC | Age: 74
End: 2024-06-14

## 2024-06-14 VITALS
TEMPERATURE: 99 F | BODY MASS INDEX: 37.71 KG/M2 | DIASTOLIC BLOOD PRESSURE: 64 MMHG | HEART RATE: 66 BPM | HEIGHT: 63 IN | SYSTOLIC BLOOD PRESSURE: 130 MMHG | OXYGEN SATURATION: 98 % | WEIGHT: 212.81 LBS | RESPIRATION RATE: 18 BRPM

## 2024-06-14 DIAGNOSIS — E55.9 VITAMIN D DEFICIENCY, UNSPECIFIED: ICD-10-CM

## 2024-06-14 DIAGNOSIS — R74.8 ELEVATED LIVER ENZYMES: Primary | ICD-10-CM

## 2024-06-14 DIAGNOSIS — R55 SYNCOPE AND COLLAPSE: ICD-10-CM

## 2024-06-14 DIAGNOSIS — G47.33 OBSTRUCTIVE SLEEP APNEA SYNDROME: ICD-10-CM

## 2024-06-14 PROCEDURE — 99213 OFFICE O/P EST LOW 20 MIN: CPT | Performed by: INTERNAL MEDICINE

## 2024-06-14 NOTE — PROGRESS NOTES
HPI:    Patient ID: Citlalli Najera is a 73 year old female.    Hypertension  Patient is here for follow up of hy(walking 2 miles qday and housework) pertension. BP at home: not check.   Has been compliant with medications.  Exercise level: trying to do more and has been following low salt diet.  Weight has been stable. Cooks with salt.   Wt Readings from Last 3 Encounters:   06/14/24 212 lb 12.8 oz (96.5 kg)   05/24/24 213 lb 9.6 oz (96.9 kg)   05/11/24 208 lb (94.3 kg)     BP Readings from Last 3 Encounters:   06/14/24 138/65   05/24/24 128/55   05/11/24 135/70     Labs:   Lab Results   Component Value Date/Time    GLU 94 05/10/2024 07:20 AM     05/10/2024 07:20 AM    K 3.9 05/10/2024 07:20 AM     05/10/2024 07:20 AM    CO2 29.0 05/10/2024 07:20 AM    CREATSERUM 0.79 05/10/2024 07:20 AM    CA 9.1 05/10/2024 07:20 AM    AST 35 (H) 05/10/2024 07:20 AM    ALT 45 05/10/2024 07:20 AM    TSH 0.730 09/29/2022 11:40 AM        Lab Results   Component Value Date/Time    CHOLEST 187 05/09/2024 11:47 AM    HDL 60 (H) 05/09/2024 11:47 AM    TRIG 208 (H) 05/09/2024 11:47 AM    LDL 92 05/09/2024 11:47 AM    NONHDLC 127 05/09/2024 11:47 AM            Wt Readings from Last 3 Encounters:   06/14/24 212 lb 12.8 oz (96.5 kg)   05/24/24 213 lb 9.6 oz (96.9 kg)   05/11/24 208 lb (94.3 kg)     BP Readings from Last 3 Encounters:   06/14/24 138/65   05/24/24 128/55   05/11/24 135/70     Labs:   Lab Results   Component Value Date/Time    GLU 94 05/10/2024 07:20 AM     05/10/2024 07:20 AM    K 3.9 05/10/2024 07:20 AM     05/10/2024 07:20 AM    CO2 29.0 05/10/2024 07:20 AM    CREATSERUM 0.79 05/10/2024 07:20 AM    CA 9.1 05/10/2024 07:20 AM    AST 35 (H) 05/10/2024 07:20 AM    ALT 45 05/10/2024 07:20 AM    TSH 0.730 09/29/2022 11:40 AM        Lab Results   Component Value Date/Time    CHOLEST 187 05/09/2024 11:47 AM    HDL 60 (H) 05/09/2024 11:47 AM    TRIG 208 (H) 05/09/2024 11:47 AM    LDL 92 05/09/2024 11:47 AM     [facility] 127 05/09/2024 11:47 AM          No more episodes of syncope.  Nor dizziness.  Vertigo is resolved completely.  If happens again we will see for same physical therapist.  Physical therapy helped a lot per patient.    Feels now that the dizziness is better does not need to see a psychiatrist.          Review of Systems   Constitutional:  Negative for activity change, appetite change, chills, diaphoresis, fatigue, fever and unexpected weight change.   Respiratory:  Negative for apnea, cough, choking, chest tightness, shortness of breath, wheezing and stridor.    Cardiovascular:  Negative for chest pain, palpitations and leg swelling.   Gastrointestinal:  Negative for abdominal distention and abdominal pain.   Endocrine: Negative for cold intolerance, heat intolerance, polydipsia, polyphagia and polyuria.   Neurological:  Negative for dizziness, tremors, seizures, syncope, facial asymmetry, speech difficulty, weakness, light-headedness, numbness and headaches.   All other systems reviewed and are negative.        Current Outpatient Medications   Medication Sig Dispense Refill    ketoconazole 2 % External Shampoo Apply 1 mL topically twice a week. 120 mL 1    Amitriptyline HCl 100 MG Oral Tab Take 1 tablet (100 mg total) by mouth nightly. 90 tablet 1    montelukast 10 MG Oral Tab Take 1 tablet (10 mg total) by mouth nightly as needed. 90 tablet 3    meclizine 25 MG Oral Tab Take 1 tablet (25 mg total) by mouth 3 (three) times daily as needed. 15 tablet 0    clotrimazole-betamethasone 1-0.05 % External Cream APPLY 1 APPLICATION TOPICALLY EVERY 12 HOURS 60 g 0    celecoxib 100 MG Oral Cap Take 1 capsule (100 mg total) by mouth 2 (two) times daily. 180 capsule 1    hydrocortisone (PROCTO-MED HC) 2.5 % External Cream Place 1 Application rectally 2 (two) times daily. 28 g 1    betamethasone dipropionate 0.05 % External Cream Apply 1 Application  topically 2 (two) times daily. APPLY TO AFFECTED AREA 45 g 3     clobetasol 0.05 % External Cream Apply 1 Application  topically 2 (two) times daily. 45 g 0    albuterol (PROAIR HFA) 108 (90 Base) MCG/ACT Inhalation Aero Soln Inhale 2 puffs into the lungs every 4 (four) hours as needed for Wheezing. 1 each 0    pantoprazole 40 MG Oral Tab EC Take 1 tablet (40 mg total) by mouth before breakfast. 90 tablet 3    ALREX 0.2 % Ophthalmic Suspension       diazePAM 10 MG Oral Tab 1-2 pills 15 min before MRI (Patient not taking: Reported on 6/14/2024) 2 tablet 0    diazePAM (VALIUM) 2 MG Oral Tab Take 1 tablet (2 mg total) by mouth every 8 (eight) hours as needed (vertigo). (Patient not taking: Reported on 6/14/2024) 30 tablet 0     Allergies:  Allergies   Allergen Reactions    Corn OTHER (SEE COMMENTS)     Allergy Test Screening    Dust Mite Mixed Allergen Ext [Mite (D. Farinae)] OTHER (SEE COMMENTS)     Allergy Test Screening    Fish OTHER (SEE COMMENTS)     Allergy Test Screening    Fish-Derived Products OTHER (SEE COMMENTS)     Allergy Test Screening    Other OTHER (SEE COMMENTS)     Allergy Test Screening - Cockroach    Shrimp OTHER (SEE COMMENTS)     Allergy Test Screening       HISTORY:  Past Medical History:    Acid reflux disease    Breast mass in female    Difficulty sleeping    Elevated liver enzymes    Fatty liver disease, nonalcoholic    Pneumonia due to organism    Postmenopausal bleeding    Prediabetes    Submucous uterine fibroid    Vaccine counseling    Visual impairment    glasses      Past Surgical History:   Procedure Laterality Date    Breast surgery Right 2000    Removed Mass from breast two times Benign.     Colonoscopy  10/2018    Colonoscopy N/A 10/18/2018    Procedure: COLONOSCOPY, POSSIBLE BIOPSY, POSSIBLE POLYPECTOMY 46728;  Surgeon: Willis Nelson MD;  [facility] (cpt=[facility])      Needle biopsy left        Family History   Problem Relation Age of Onset    Diabetes Mother       Social History:    Social History     Socioeconomic History    Marital status:    Occupational History    Occupation: Retired from [employer].    Tobacco Use    Smoking status: Former     Current packs/day: 0.00     Average packs/day: 1 pack/day for 40.0 years (40.0 ttl pk-yrs)     Types: Cigarettes     Start date:      Quit date:      Years since quittin.4    Smokeless tobacco: Never   Vaping Use    Vaping status: Never Used   Substance and Sexual Activity    Alcohol use: No    Drug use: No   Social History Narrative      5 months ago.      Social Determinants of Health     Financial Resource Strain: Low Risk  (2024)    Financial Resource Strain     Med Affordability: No   Food Insecurity: No Food Insecurity (2024)    Food Insecurity     Food Insecurity: Never true   Transportation Needs: No Transportation Needs (2024)    Transportation Needs     Lack of Transportation: No   Housing Stability: Low Risk  (2024)    Housing Stability     Housing Instability: No        PHYSICAL EXAM:   /65   Pulse 66   Resp 18   Ht 5' 3\" (1.6 m)   Wt 212 lb 12.8 oz (96.5 kg)   BMI 37.70 kg/m²   BP Readings from Last 3 Encounters:   24 138/65   24 128/55   24 135/70     Wt Readings from Last 3 Encounters:   24 212 lb 12.8 oz (96.5 kg)   24 213 lb 9.6 oz (96.9 kg)   24 208 lb (94.3 kg)       Physical Exam  Vitals and nursing note reviewed.   Constitutional:       General: She is not in acute distress.     Appearance: Normal appearance. She is well-developed. She is not ill-appearing, toxic-appearing or diaphoretic.      Interventions: She is not intubated.  Neck:      Thyroid: No thyroid mass or thyromegaly.      Trachea: Trachea and phonation normal.   Cardiovascular:      Rate and Rhythm: Normal rate and regular rhythm.      Pulses: Normal pulses. No decreased pulses.           Carotid pulses are 2+ on the right side and 2+ on the left side.        Radial pulses are 2+ on the right side and 2+ on the left side.        Dorsalis pedis pulses are 2+ on the right side and 2+ on the left side.        Posterior tibial pulses are 2+ on the right side and 2+ on the left side.      Heart sounds: Normal heart sounds, S1 normal and S2 normal.   Pulmonary:      Effort: Pulmonary effort is normal. No tachypnea, bradypnea, accessory muscle usage, prolonged expiration, respiratory distress or retractions. She is not intubated.      Breath sounds: Normal breath sounds and air entry. No stridor, decreased air movement or transmitted upper airway sounds. No decreased breath sounds, wheezing, rhonchi or rales.   Chest:      Chest wall: No tenderness.   Abdominal:      General: There is no distension.      Palpations: Abdomen is soft.      Tenderness: There is no abdominal tenderness.   Musculoskeletal:      Right lower leg: No edema.      Left lower leg: No edema.   Skin:     General: Skin is warm and dry.   Neurological:      Mental Status: She is alert and oriented to person, place, and time.   Psychiatric:         Behavior: Behavior normal. Behavior is cooperative.         Thought Content: Thought content normal.              ASSESSMENT/PLAN:     Encounter Diagnoses   Name Primary?    Elevated liver enzymes Resolved.    Yes    Vitamin D deficiency, unspecified Stable.        Obstructive sleep apnea syndrome Compliant with use. Refreshing sleep.        Syncope and collapse FU neurology.         No orders of the defined types were placed in this encounter.      Meds This Visit:  Requested Prescriptions      No prescriptions requested or ordered in this encounter       Imaging & Referrals:  None      Has set justice't.

## 2024-06-14 NOTE — TELEPHONE ENCOUNTER
She was asking if she can get sooner justice't with neurology?? Any Thurs. So she has transportation.

## 2024-06-14 NOTE — PATIENT INSTRUCTIONS
ASSESSMENT/PLAN:     Encounter Diagnoses   Name Primary?    Elevated liver enzymes Resolved.    Yes    Vitamin D deficiency, unspecified Stable.        Obstructive sleep apnea syndrome Compliant with use. Refreshing sleep.        Syncope and collapse FU neurology.         No orders of the defined types were placed in this encounter.      Meds This Visit:  Requested Prescriptions      No prescriptions requested or ordered in this encounter       Imaging & Referrals:  None      Has set justice't.

## 2024-06-14 NOTE — TELEPHONE ENCOUNTER
RN called neurology office but it is the answering service now.     RN =please call neurology on Monday (see previous notes  below ). Can schedule an earlier appointment with Dr Rmua Cortez  as long as it is THURSDAY .

## 2024-06-14 NOTE — TELEPHONE ENCOUNTER
RN contacted [facility] Neurology department [phone] .  Per staff, Dr Monte is NOT in the office until next week Tuesday, she could send a message to her but we have to wait until next week.    They have another specialist Dr Ruma Cortez available on 7/25/24 at 0930 am, no need to generate a  new referral.     BUT they could not schedule an appointment with 2 different specialist .   Informed that we will check with Dr Argueta and will call them back.      Future Appointments   Date Time Provider Department Center   6/18/2024  1:20 PM HND [place] [address] HND [facility]   8/1/2024  9:20 AM Bella Monte DO [facility]   11/29/2024  8:30 AM Stefani Argueta MD [facility]

## 2024-06-17 NOTE — TELEPHONE ENCOUNTER
[facility] Nurse Pool, please see message below.     It is ok to schedule patient with a different provider on a Thursday. Please contact her to schedule.

## 2024-06-18 ENCOUNTER — HOSPITAL ENCOUNTER (OUTPATIENT)
Dept: MAMMOGRAPHY | Age: 74
Discharge: HOME | End: 2024-06-18
Attending: INTERNAL MEDICINE

## 2024-06-18 DIAGNOSIS — Z12.31 BREAST CANCER SCREENING BY MAMMOGRAM: ICD-10-CM

## 2024-06-18 PROCEDURE — 77067 SCR MAMMO BI INCL CAD: CPT | Performed by: INTERNAL MEDICINE

## 2024-06-18 PROCEDURE — 77063 BREAST TOMOSYNTHESIS BI: CPT | Performed by: INTERNAL MEDICINE

## 2024-06-28 ENCOUNTER — OFFICE VISIT (OUTPATIENT)
Dept: NEUROLOGY | Facility: CLINIC | Age: 74
End: 2024-06-28

## 2024-06-28 VITALS
BODY MASS INDEX: 40.02 KG/M2 | WEIGHT: 212 LBS | SYSTOLIC BLOOD PRESSURE: 128 MMHG | HEIGHT: 61 IN | OXYGEN SATURATION: 98 % | HEART RATE: 70 BPM | RESPIRATION RATE: 16 BRPM | DIASTOLIC BLOOD PRESSURE: 73 MMHG

## 2024-06-28 DIAGNOSIS — R68.89 FORGETFULNESS: ICD-10-CM

## 2024-06-28 DIAGNOSIS — I72.0 CAROTID ANEURYSM, LEFT (HCC): Primary | ICD-10-CM

## 2024-06-28 DIAGNOSIS — R55 SYNCOPE, UNSPECIFIED SYNCOPE TYPE: ICD-10-CM

## 2024-06-28 PROCEDURE — 99215 OFFICE O/P EST HI 40 MIN: CPT | Performed by: INTERNAL MEDICINE

## 2024-06-28 NOTE — PROGRESS NOTES
[facility] [address], [address]  [facility] [address]  [phone]            Neurology Initial Visit     Referred By: Dr. Oh ref. provider found    Chief Complaint:   Chief Complaint   Patient presents with    Neurologic Problem     Patient here today to establish care along with her son Jose C, patient was referred by Dr Argueta to treat and evaluate Aneurysm/ abnormal MRI. Patient c/o after MRI she has been experiencing strange mental stages. Denies pain.  Patient currently driving        HPI:     Citlalli Najera is a 73 year old female with history of prediabetes, vitamin D deficiency, CRISSY on CPAP, morbid obesity, who presents for evaluation of abnormal MRI, syncope.  Had 2 episodes of syncope in May, 1 was witnessed and showed no shaking or incontinence.  Was admitted here for 2 days with no clear etiology found.  Since discharge, she has had no further episodes of syncope, but she has been very stressed because she was afraid to go outside and walk or drive due to concern that another episode would occur.  She states that there is no warning before each episode that occurred previously.  She lives alone and has 2 dogs.  She underwent MRI a few weeks ago and was in the scanner for nearly 3 hours.  Since then, she has been feeling forgetful.  Misplaces objects throughout the day.  Sleep is okay, has history of CRISSY using CPAP.    Past Medical History:    Acid reflux disease    Breast mass in female    Difficulty sleeping    Elevated liver enzymes    Fatty liver disease, nonalcoholic    Pneumonia due to organism    Postmenopausal bleeding    Prediabetes    Submucous uterine fibroid    Vaccine counseling    Visual impairment    glasses       Past Surgical History:   Procedure Laterality Date    Breast surgery Right 2000    Removed Mass from breast two times Benign.     Colonoscopy  10/2018    Colonoscopy N/A 10/18/2018    Procedure: COLONOSCOPY, POSSIBLE BIOPSY,  POSSIBLE POLYPECTOMY 93878;  Surgeon: Willis Nelson MD;  [facility] localization wire 1 site left (cpt=19281)      Needle biopsy left         Social history:  History   Smoking Status    Former    Types: Cigarettes   Smokeless Tobacco    Never     History   Alcohol Use No     History   Drug Use No       Family History   Problem Relation Age of Onset    Diabetes Mother          Current Outpatient Medications:     ketoconazole 2 % External Shampoo, Apply 1 mL topically twice a week., Disp: 120 mL, Rfl: 1    diazePAM 10 MG Oral Tab, 1-2 pills 15 min before MRI (Patient not taking: Reported on 6/14/2024), Disp: 2 tablet, Rfl: 0    Amitriptyline HCl 100 MG Oral Tab, Take 1 tablet (100 mg total) by mouth nightly., Disp: 90 tablet, Rfl: 1    montelukast 10 MG Oral Tab, Take 1 tablet (10 mg total) by mouth nightly as needed., Disp: 90 tablet, Rfl: 3    diazePAM (VALIUM) 2 MG Oral Tab, Take 1 tablet (2 mg total) by mouth every 8 (eight) hours as needed (vertigo). (Patient not taking: Reported on 6/14/2024), Disp: 30 tablet, Rfl: 0    meclizine 25 MG Oral Tab, Take 1 tablet (25 mg total) by mouth 3 (three) times daily as needed., Disp: 15 tablet, Rfl: 0    clotrimazole-betamethasone 1-0.05 % External Cream, APPLY 1 APPLICATION TOPICALLY EVERY 12 HOURS, Disp: 60 g, Rfl: 0    celecoxib 100 MG Oral Cap, Take 1 capsule (100 mg total) by mouth 2 (two) times daily., Disp: 180 capsule, Rfl: 1    hydrocortisone (PROCTO-MED HC) 2.5 % External Cream, Place 1 Application rectally 2 (two) times daily., Disp: 28 g, Rfl: 1    betamethasone dipropionate 0.05 % External Cream, Apply 1 Application  topically 2 (two) times daily. APPLY TO AFFECTED AREA, Disp: 45 g, Rfl: 3    clobetasol 0.05 % External Cream, Apply 1 Application  topically 2 (two) times daily., Disp: 45 g, Rfl: 0    albuterol (PROAIR HFA) 108 (90 Base) MCG/ACT Inhalation Aero Soln, Inhale 2 puffs into the lungs every 4 (four) hours as needed  for Wheezing., Disp: 1 each, Rfl: 0    pantoprazole 40 MG Oral Tab EC, Take 1 tablet (40 mg total) by mouth before breakfast., Disp: 90 tablet, Rfl: 3    ALREX 0.2 % Ophthalmic Suspension, , Disp: , Rfl:     Allergies   Allergen Reactions    Corn OTHER (SEE COMMENTS)     Allergy Test Screening    Dust Mite Mixed Allergen Ext [Mite (D. Farinae)] OTHER (SEE COMMENTS)     Allergy Test Screening    Fish OTHER (SEE COMMENTS)     Allergy Test Screening    Fish-Derived Products OTHER (SEE COMMENTS)     Allergy Test Screening    Other OTHER (SEE COMMENTS)     Allergy Test Screening - Cockroach    Shrimp OTHER (SEE COMMENTS)     Allergy Test Screening       ROS:   As in HPI, the rest of the 14 system review was done and was negative      Physical Exam:  Vitals:    06/28/24 1055 06/28/24 1103   BP: 152/88 128/73   Pulse: 70 70   Resp: 16    SpO2: 98%    Weight: 212 lb (96.2 kg)    Height: 61\"        General: No apparent distress, well nourished, well groomed.  Head- Normocephalic, atraumatic  Eyes- No redness or swelling    Neurological:   Mental Status:  Mental Status- Alert, conversant, speech fluent, following all commands and Fund of knowledge appropriate for education and age    Cranial Nerves:  II.- Visual fields full to confrontation, III, IV, VI- EOM intact, PERRL, V. Facial sensation intact, and VII. Face symmetric, no facial weakness    Motor Exam:  Strength- upper extremities 5/5 proximally and distally                - lower  extremities 5/5 proximally and distally    Sensory Exam:  Pinprick- intact in all 4 extremities  Vibration- intact in all 4 extremities    Deep Tendon Reflexes:  1+ throughout except absent ankle jerks bilaterally    Coordination:  No dysmetria with finger to nose bilaterally    Gait:  Normal casual gait    Labs:    Lab Results   Component Value Date    TSH 0.730 09/29/2022     Lab Results   Component Value Date    HDL 60 (H) 05/09/2024    LDL 92 05/09/2024    TRIG 208 (H) 05/09/2024     Lab  Results   Component Value Date    HGB 14.1 05/10/2024    HCT 42.6 05/10/2024    MCV 84.4 05/10/2024    WBC 8.2 05/10/2024    .0 05/10/2024      Lab Results   Component Value Date    BUN 12 05/10/2024    CA 9.1 05/10/2024    ALT 45 05/10/2024    AST 35 (H) 05/10/2024    ALB 4.0 05/10/2024     05/10/2024    K 3.9 05/10/2024     05/10/2024    CO2 29.0 05/10/2024      I have reviewed labs.    Imaging Studies:  I have independently reviewed imaging.    MRI brain with and without shows mild white matter ischemic changes  MRA brain shows 2.5 mm distal left ICA aneurysm  MRA carotids shows no significant stenosis or abnormality in carotids and vertebrals    Routine EEG-normal awake sleep        Assessment   Citlalli Najera is a 73 year old female, who presents for posthospital follow-up of syncope.  Underwent MRI/MRA which showed 2.5 mm distal left ICA aneurysm.  This is likely incidental, and was noncontributory to the syncopal episodes.  Discussed at length that with a very small aneurysm this is likely asymptomatic, but does need follow-up with yearly imaging.  With such small size it is at low risk of rupture but if growth is seen on serial imaging can consider neurosurgery referral.      1. Carotid aneurysm, left (HCC)  -Repeat MRA or CTA in 1 year  -Go to ED if symptoms of ptosis or diplopia occur  -Non-smoker    2. Syncope, unspecified syncope type  -Etiology unclear, with no prodrome consider outpatient cardiac monitor as suggested by cardiology team  -No clear neurologic cause identified    3. Forgetfulness  -Likely related to stress due to recent hospital admission and change in routine, lifestyle  -Advised patient to gradually increase level of activity back to previous  -Discussed at length that MRI did not cause any damage to the brain to produce forgetfulness         Education and counseling provided to patient. Instructed patient to call my office or seek medical attention immediately if  symptoms worsen.  Patient verbalized understanding of information given. All questions were answered. All side effects of drugs were discussed.     Time spent for examination, counseling and coordination of care such as potential treatment options- 50   minutes with more than 50% of the time spent counseling the patient.    Return to clinic in: Return in about 3 months (around 9/28/2024).    Ruma Cortez MD

## 2024-09-06 ENCOUNTER — PATIENT OUTREACH (OUTPATIENT)
Dept: CASE MANAGEMENT | Age: 74
End: 2024-09-06

## 2024-09-30 ENCOUNTER — LAB ENCOUNTER (OUTPATIENT)
Dept: LAB | Facility: HOSPITAL | Age: 74
End: 2024-09-30
Attending: INTERNAL MEDICINE
Payer: MEDICARE

## 2024-09-30 ENCOUNTER — OFFICE VISIT (OUTPATIENT)
Dept: NEUROLOGY | Facility: CLINIC | Age: 74
End: 2024-09-30
Payer: MEDICARE

## 2024-09-30 ENCOUNTER — MED REC SCAN ONLY (OUTPATIENT)
Dept: NEUROLOGY | Facility: CLINIC | Age: 74
End: 2024-09-30

## 2024-09-30 VITALS
RESPIRATION RATE: 16 BRPM | SYSTOLIC BLOOD PRESSURE: 146 MMHG | WEIGHT: 212 LBS | HEIGHT: 63.5 IN | OXYGEN SATURATION: 97 % | BODY MASS INDEX: 37.1 KG/M2 | DIASTOLIC BLOOD PRESSURE: 77 MMHG | HEART RATE: 84 BPM

## 2024-09-30 DIAGNOSIS — R68.89 FORGETFULNESS: ICD-10-CM

## 2024-09-30 DIAGNOSIS — R68.89 FORGETFULNESS: Primary | ICD-10-CM

## 2024-09-30 LAB — VIT B12 SERPL-MCNC: 1165 PG/ML (ref 211–911)

## 2024-09-30 PROCEDURE — 36415 COLL VENOUS BLD VENIPUNCTURE: CPT

## 2024-09-30 PROCEDURE — 99214 OFFICE O/P EST MOD 30 MIN: CPT | Performed by: INTERNAL MEDICINE

## 2024-09-30 PROCEDURE — 82607 VITAMIN B-12: CPT

## 2024-09-30 NOTE — PROGRESS NOTES
[facility] NEUROSCIENCES [address], [address]  [facility] [address]  [phone]            Neurology Initial Visit     Referred By: Dr. Oh ref. provider found    Chief Complaint:   Chief Complaint   Patient presents with    Neurologic Problem     LOV 6/28/2024 For Carotid aneurysm, left. Patient is here today follow-up of syncope. Patient c/o memory loss, does not remember family members names, and reports report after PT the vertigo and faintness has improved. Denies pain, dizziness, no nausea.        HPI:     Citlalli Najera is a 74 year old female with history of prediabetes, vitamin D deficiency, CRISSY on CPAP, morbid obesity, who presents for follow up of left carotid aneurysm and forgetfulness.  Since last visit, she has been gradually returning back to her normal level of activity.  Has had no further episodes of syncope and now she is driving, shopping, doing everything independently once again.  No longer feels concerned or scared that she is going to have a fainting episode, feels safe when she is out and about by herself.  She still feels forgetful, but mostly with remembering her sons and grandchildren's names.  Does not notice any forgetfulness when it comes to managing finances, meal planning, shopping, household chores.      Past Medical History:    Acid reflux disease    Breast mass in female    Difficulty sleeping    Elevated liver enzymes    Fatty liver disease, nonalcoholic    Pneumonia due to organism    Postmenopausal bleeding    Prediabetes    Submucous uterine fibroid    Vaccine counseling    Visual impairment    glasses       Past Surgical History:   Procedure Laterality Date    Breast surgery Right 2000    Removed Mass from breast two times Benign.     Colonoscopy  10/2018    Colonoscopy N/A 10/18/2018    Procedure: COLONOSCOPY, POSSIBLE BIOPSY, POSSIBLE POLYPECTOMY 69785;  Surgeon: Willis Nelson MD;  [place] [facility] [place] [facility] [place] [facility]  wire 1 site left (cpt=[id number])      Needle biopsy left         Social history:  History   Smoking Status    Former    Types: Cigarettes   Smokeless Tobacco    Never     History   Alcohol Use No     History   Drug Use No       Family History   Problem Relation Age of Onset    Diabetes Mother          Current Outpatient Medications:     ketoconazole 2 % External Shampoo, Apply 1 mL topically twice a week., Disp: 120 mL, Rfl: 1    diazePAM 10 MG Oral Tab, 1-2 pills 15 min before MRI (Patient not taking: Reported on 6/14/2024), Disp: 2 tablet, Rfl: 0    Amitriptyline HCl 100 MG Oral Tab, Take 1 tablet (100 mg total) by mouth nightly., Disp: 90 tablet, Rfl: 1    montelukast 10 MG Oral Tab, Take 1 tablet (10 mg total) by mouth nightly as needed., Disp: 90 tablet, Rfl: 3    diazePAM (VALIUM) 2 MG Oral Tab, Take 1 tablet (2 mg total) by mouth every 8 (eight) hours as needed (vertigo). (Patient not taking: Reported on 6/14/2024), Disp: 30 tablet, Rfl: 0    meclizine 25 MG Oral Tab, Take 1 tablet (25 mg total) by mouth 3 (three) times daily as needed., Disp: 15 tablet, Rfl: 0    clotrimazole-betamethasone 1-0.05 % External Cream, APPLY 1 APPLICATION TOPICALLY EVERY 12 HOURS, Disp: 60 g, Rfl: 0    celecoxib 100 MG Oral Cap, Take 1 capsule (100 mg total) by mouth 2 (two) times daily., Disp: 180 capsule, Rfl: 1    hydrocortisone (PROCTO-MED HC) 2.5 % External Cream, Place 1 Application rectally 2 (two) times daily., Disp: 28 g, Rfl: 1    betamethasone dipropionate 0.05 % External Cream, Apply 1 Application  topically 2 (two) times daily. APPLY TO AFFECTED AREA, Disp: 45 g, Rfl: 3    clobetasol 0.05 % External Cream, Apply 1 Application  topically 2 (two) times daily., Disp: 45 g, Rfl: 0    albuterol (PROAIR HFA) 108 (90 Base) MCG/ACT Inhalation Aero Soln, Inhale 2 puffs into the lungs every 4 (four) hours as needed for Wheezing., Disp: 1 each, Rfl: 0    pantoprazole 40 MG Oral Tab EC, Take 1 tablet (40 mg total) by mouth before  breakfast., Disp: 90 tablet, Rfl: 3    ALREX 0.2 % Ophthalmic Suspension, , Disp: , Rfl:     Allergies   Allergen Reactions    Corn OTHER (SEE COMMENTS)     Allergy Test Screening    Dust Mite Mixed Allergen Ext [Mite (D. Farinae)] OTHER (SEE COMMENTS)     Allergy Test Screening    Fish OTHER (SEE COMMENTS)     Allergy Test Screening    Fish-Derived Products OTHER (SEE COMMENTS)     Allergy Test Screening    Other OTHER (SEE COMMENTS)     Allergy Test Screening - Cockroach    Shrimp OTHER (SEE COMMENTS)     Allergy Test Screening       ROS:   As in HPI, the rest of the 14 system review was done and was negative      Physical Exam:  Vitals:    09/30/24 1252   BP: 146/77   Pulse: 84   Resp: 16   SpO2: 97%   Weight: 212 lb (96.2 kg)   Height: 63.5\"       General: No apparent distress, well nourished, well groomed.  Head- Normocephalic, atraumatic  Eyes- No redness or swelling    Neurological:   Mental Status:  Mental Status- Alert, conversant, speech fluent, following all commands and Fund of knowledge appropriate for education and age    Cranial Nerves:  II.- Visual fields full to confrontation, III, IV, VI- EOM intact, VII. Face symmetric, no facial weakness    Motor Exam:  Strength- upper extremities 5/5 proximally and distally                - lower  extremities 5/5 proximally and distally    Sensory Exam:  LT- intact in all 4 extremities    Coordination:  No dysmetria with finger to nose bilaterally    Gait:  Normal casual gait    Labs:    Lab Results   Component Value Date    TSH 0.730 09/29/2022     Lab Results   Component Value Date    HDL 60 (H) 05/09/2024    LDL 92 05/09/2024    TRIG 208 (H) 05/09/2024     Lab Results   Component Value Date    HGB 14.1 05/10/2024    HCT 42.6 05/10/2024    MCV 84.4 05/10/2024    WBC 8.2 05/10/2024    .0 05/10/2024      Lab Results   Component Value Date    BUN 12 05/10/2024    CA 9.1 05/10/2024    ALT 45 05/10/2024    AST 35 (H) 05/10/2024    ALB 4.0 05/10/2024    NA  143 05/10/2024    K 3.9 05/10/2024     05/10/2024    CO2 29.0 05/10/2024      I have reviewed labs.    Imaging Studies:  I have independently reviewed imaging.    MRI brain with and without shows mild white matter ischemic changes  MRA brain shows 2.5 mm distal left ICA aneurysm  MRA carotids shows no significant stenosis or abnormality in carotids and vertebrals    Routine EEG-normal awake sleep        Assessment   Citlalli Najera is a 74 year old female, who presents for follow-up of left carotid aneurysm and forgetfulness.  Doing much better since last visit, living independently and managing household well.  Still feels forgetful with names of family members, but does not seem to be affecting her in other areas.    1. Carotid aneurysm, left (HCC)  -Repeat MRA or CTA in summer 2025    2. Syncope, unspecified syncope type  -Etiology unclear, with no prodrome consider outpatient cardiac monitor as suggested by cardiology team  -No clear neurologic cause identified    3. Forgetfulness  -[employer] 18/30 but has 6th grade education level and anxiety may be impacting performance.  Functioning well at home with managing complex tasks which is reassuring.    - Consider neuropsych testing if symptoms worsen       Education and counseling provided to patient. Instructed patient to call my office or seek medical attention immediately if symptoms worsen.  Patient verbalized understanding of information given. All questions were answered. All side effects of drugs were discussed.     Time spent for examination, counseling and coordination of care such as potential treatment options- 50   minutes with more than 50% of the time spent counseling the patient.    Return to clinic in: Return in about 9 months (around 6/30/2025).    Ruma Cortez MD

## 2024-10-09 RX ORDER — CLOTRIMAZOLE AND BETAMETHASONE DIPROPIONATE 10; .64 MG/G; MG/G
CREAM TOPICAL
Qty: 60 G | Refills: 3 | Status: SHIPPED | OUTPATIENT
Start: 2024-10-09

## 2024-10-09 NOTE — TELEPHONE ENCOUNTER
Please Review. Protocol Failed; No Protocol     Requested Prescriptions   Pending Prescriptions Disp Refills    CLOTRIMAZOLE-BETAMETHASONE 1-0.05 % External Cream [Pharmacy Med Name: Clotrimazole/Betamethasone Dipropionate 1-0.05 % Cre Taro] 60 g 0     Sig: APPLY 1 APPLICATION TOPICALLY EVERY 12 HOURS       There is no refill protocol information for this order            Future Appointments         Provider Department Appt Notes    In 1 month Stefani Argueta MD [facility] [place] [facility]          Recent Outpatient Visits              1 week ago Forgetfulness    [facility]    Office Visit    3 months ago Carotid aneurysm, left (HCC)    [facility]    Office Visit    3 months ago Elevated liver enzymes    [facility]    Office Visit    4 months ago Vitamin D deficiency, unspecified     [facility]    Office Visit    5 months ago     [facility] Piper Lo PT    Office Visit

## 2024-11-20 PROBLEM — J41.0 SMOKERS' COUGH (HCC): Chronic | Status: RESOLVED | Noted: 2022-01-20 | Resolved: 2024-11-20

## 2024-11-20 PROBLEM — E66.01 MORBID (SEVERE) OBESITY DUE TO EXCESS CALORIES (HCC): Status: RESOLVED | Noted: 2022-09-29 | Resolved: 2024-11-20

## 2024-11-21 RX ORDER — AMITRIPTYLINE HYDROCHLORIDE 100 MG/1
100 TABLET ORAL NIGHTLY
Qty: 90 TABLET | Refills: 0 | Status: SHIPPED | OUTPATIENT
Start: 2024-11-21

## 2024-11-21 NOTE — TELEPHONE ENCOUNTER
Please review. Protocol Failed; No Protocol    Requested Prescriptions   Pending Prescriptions Disp Refills    AMITRIPTYLINE  MG Oral Tab [Pharmacy Med Name: Amitriptyline Hydrochloride 100 Mg Tab Nort] 90 tablet 0     Sig: Take 1 tablet (100 mg total) by mouth nightly.       There is no refill protocol information for this order            Future Appointments         Provider Department Appt Notes    In 6 days Stefani Argueta MD [facility], [place], [place] Annual Physical          Recent Outpatient Visits              1 month ago Forgetfulness    [facility]    Office Visit    4 months ago Carotid aneurysm, left ([facility]CC)    [facility]    Office Visit    5 months ago Elevated liver enzymes    [facility] E[facility]    Office Visit    6 months ago Vitamin D deficiency, unspecified     [facility] E[facility]    Office Visit    7 months ago     [facility] Piper Lo PT    Office Visit

## 2024-11-26 PROBLEM — H81.10 BENIGN PAROXYSMAL POSITIONAL VERTIGO: Status: RESOLVED | Noted: 2024-05-11 | Resolved: 2024-11-26

## 2024-11-26 PROBLEM — R55 SYNCOPE AND COLLAPSE: Status: RESOLVED | Noted: 2024-05-09 | Resolved: 2024-11-26

## 2024-11-26 PROBLEM — R73.9 HYPERGLYCEMIA: Status: RESOLVED | Noted: 2024-05-09 | Resolved: 2024-11-26

## 2024-11-27 ENCOUNTER — OFFICE VISIT (OUTPATIENT)
Dept: INTERNAL MEDICINE CLINIC | Facility: CLINIC | Age: 74
End: 2024-11-27

## 2024-11-27 VITALS
OXYGEN SATURATION: 100 % | RESPIRATION RATE: 18 BRPM | WEIGHT: 205.38 LBS | DIASTOLIC BLOOD PRESSURE: 63 MMHG | HEART RATE: 67 BPM | BODY MASS INDEX: 35.94 KG/M2 | SYSTOLIC BLOOD PRESSURE: 127 MMHG | HEIGHT: 63.5 IN

## 2024-11-27 DIAGNOSIS — M54.12 CERVICAL RADICULOPATHY: ICD-10-CM

## 2024-11-27 DIAGNOSIS — G47.33 OBSTRUCTIVE SLEEP APNEA SYNDROME: ICD-10-CM

## 2024-11-27 DIAGNOSIS — E78.00 HIGH CHOLESTEROL: ICD-10-CM

## 2024-11-27 DIAGNOSIS — K76.0 FATTY LIVER DISEASE, NONALCOHOLIC: ICD-10-CM

## 2024-11-27 DIAGNOSIS — R73.03 PREDIABETES: ICD-10-CM

## 2024-11-27 DIAGNOSIS — Z00.00 ENCOUNTER FOR ANNUAL HEALTH EXAMINATION: ICD-10-CM

## 2024-11-27 DIAGNOSIS — M19.019 SHOULDER ARTHRITIS: ICD-10-CM

## 2024-11-27 DIAGNOSIS — G44.89 OTHER HEADACHE SYNDROME: ICD-10-CM

## 2024-11-27 DIAGNOSIS — R74.8 ELEVATED LIVER ENZYMES: ICD-10-CM

## 2024-11-27 DIAGNOSIS — E55.9 VITAMIN D DEFICIENCY, UNSPECIFIED: ICD-10-CM

## 2024-11-27 DIAGNOSIS — K21.9 GASTROESOPHAGEAL REFLUX DISEASE WITHOUT ESOPHAGITIS: Primary | ICD-10-CM

## 2024-11-27 DIAGNOSIS — M47.816 SPONDYLOSIS OF LUMBAR REGION WITHOUT MYELOPATHY OR RADICULOPATHY: ICD-10-CM

## 2024-11-27 DIAGNOSIS — Z01.419 WELL WOMAN EXAM WITH ROUTINE GYNECOLOGICAL EXAM: ICD-10-CM

## 2024-11-27 DIAGNOSIS — G47.09 OTHER INSOMNIA: ICD-10-CM

## 2024-11-27 DIAGNOSIS — M79.671 PAIN OF RIGHT HEEL: ICD-10-CM

## 2024-11-27 DIAGNOSIS — Z12.31 BREAST CANCER SCREENING BY MAMMOGRAM: ICD-10-CM

## 2024-11-27 DIAGNOSIS — M25.50 POLYARTHRALGIA: ICD-10-CM

## 2024-11-27 LAB
APPEARANCE: CLEAR
BILIRUBIN: NEGATIVE
GLUCOSE (URINE DIPSTICK): NEGATIVE MG/DL
KETONES (URINE DIPSTICK): NEGATIVE MG/DL
LEUKOCYTES: NEGATIVE
MULTISTIX EXPIRATION DATE: NORMAL DATE
MULTISTIX LOT#: NORMAL NUMERIC
NITRITE, URINE: NEGATIVE
OCCULT BLOOD: NEGATIVE
PH, URINE: 7 (ref 4.5–8)
PROTEIN (URINE DIPSTICK): NEGATIVE MG/DL
SPECIFIC GRAVITY: 1.01 (ref 1–1.03)
URINE-COLOR: YELLOW
UROBILINOGEN,SEMI-QN: 0.2 MG/DL (ref 0–1.9)

## 2024-11-27 PROCEDURE — 99497 ADVNCD CARE PLAN 30 MIN: CPT | Performed by: INTERNAL MEDICINE

## 2024-11-27 PROCEDURE — 81003 URINALYSIS AUTO W/O SCOPE: CPT | Performed by: INTERNAL MEDICINE

## 2024-11-27 PROCEDURE — G0439 PPPS, SUBSEQ VISIT: HCPCS | Performed by: INTERNAL MEDICINE

## 2024-11-27 PROCEDURE — 99215 OFFICE O/P EST HI 40 MIN: CPT | Performed by: INTERNAL MEDICINE

## 2024-11-27 RX ORDER — AMITRIPTYLINE HYDROCHLORIDE 100 MG/1
100 TABLET ORAL NIGHTLY
Qty: 90 TABLET | Refills: 3 | Status: SHIPPED | OUTPATIENT
Start: 2024-11-27

## 2024-11-27 NOTE — PATIENT INSTRUCTIONS
ASSESSMENT/PLAN:     Encounter Diagnoses   Name Primary?    Gastroesophageal reflux disease without esophagitis Careful with diet. Avoid hot spicy foods and caffeine and caffinated beverages. Careful with acidic foods. Elevate head of bed. Wait 2 hrs. after eating before going to bed to allow digestion. Avoid caffeinated or carbonated beverages, fried foods, spicy foods or highly acidic foods (citrus, onions, tomatoes, etc  -don’t lay down 20-30 minutes after eating or drinking  -use wedge pillow under mattress or use cinder blocks to elevate head of bed-this will prevent reflux at night  -take medications as directed  -quit smoking if applicable  -call if symptoms do not improve within 2-3 days or if symptoms worsen  Follow up with Gastroenterologist     Yes    Breast cancer screening by mammogram Normal mammogram. Continue self breast exam every month.         Cervical radiculopathy Stable.        Elevated liver enzymes Check blood.        Fatty liver disease, nonalcoholic Weight loss.        High cholesterol Check blood.        Other insomnia     Obstructive sleep apnea syndrome Uses fro 10 PM til 4 Am. Refreshing sleep.        Polyarthralgia Stable.        Prediabetes Check blood.        Shoulder arthritis Stable.        Spondylosis of lumbar region without myelopathy or radiculopathy Stable.        Vitamin D deficiency, unspecified Check blood.        Well woman exam with routine gynecological exam Check urine.        Encounter for annual health examination Check blood.        Pain of right heel Improved. freeze water bottle and roll foot on it for 15 min 3-4 times per day     -do not wear flip flips or flat type shoes     -wear good supportive shoes with arch support such as gym shoes     -get plantar faciitis arch support insoles for shoes     -stretch achilles before getting out of bed and throughout the day     -this problem can take a long time to heal     -call if symptoms are worsening     -[employer] brand  shoes        Treating Plantar Fasciitis  First, your healthcare provider tries to find out the cause of your problem. He or she can then suggest ways to ease pain. If your pain is due to poor foot mechanics, occasionally custom-made shoe inserts (orthoses) may help.    Ease symptoms  To relieve mild symptoms, try aspirin, ibuprofen, or other medicines as directed. Rubbing ice on the area may also help.  To lessen severe pain and swelling, your healthcare provider may give you pills or injections. In some cases, you may need a walking cast. Physical therapy, such as ultrasound or a daily stretching program, may also be recommended. Surgery is rarely needed.  To ease symptoms caused by poor foot mechanics, your foot may be taped. This supports the arch and temporarily controls movement. Night splints may also help by stretching the fascia.    Control movement  If taping helps, your healthcare provider may prescribe orthoses. These are inserts built from plaster casts of your feet. They control the way your foot moves. As a result, your symptoms may go away.  Reduce overuse  Every time your foot strikes the ground, the plantar fascia is stretched. You can lessen the strain on the plantar fascia and the chance of overuse by:  Losing any excess weight  Not running on hard or uneven ground  Using orthoses, if recommended, in your shoes and house slippers  If surgery is needed  Your healthcare provider may consider surgery if other types of treatment don't control your pain. During surgery, the plantar fascia is partially cut to release tension. As you heal, fibrous tissue fills the space between the heel bone and the plantar fascia.  [employer] last reviewed this educational content on 1/1/2018 © 2000-2020 [employer]. [address] [place] [address], [place] [address]. All rights reserved. This information is not intended as a substitute for professional medical care. Always follow your healthcare professional's  instructions.        Understanding Plantar Fasciitis    Plantar fasciitis is a condition that causes foot and heel pain. The plantar fascia is a tough band of tissue that runs across the bottom of the foot from the heel to the toes. This tissue pulls on the heel bone. It supports the arch of the foot as it pushes off the ground. If the tissue becomes irritated or red and swollen (inflamed), it is called plantar fasciitis.    How to say it  PLAN-tar fa-shee-EYE-[facility]   What causes plantar fasciitis?  Plantar fasciitis most often occurs from overusing the plantar fascia. The tissue may become damaged from activities that put repeated stress on the heel and foot. Or it may wear down over time with age and ankle stiffness. You are more likely to have plantar fasciitis if you:   Do activities that require a lot of running, jumping, or dancing  Have new or increase activity  Have a job that requires being on your feet for long periods  Are overweight or obese  Have certain foot problems, such as a tight Achilles tendon, flat feet, or high arches  Often wear poorly fitting shoes  Symptoms of plantar fasciitis  The condition most often causes pain in the heel and the bottom of the foot. The pain may occur when you take your first steps in the morning. It may get better as you walk throughout the day. But as you continue to put weight on the foot, the pain often returns. Pain may also occur after standing or sitting for long periods.   Treating plantar fasciitis  Treatments for plantar fasciitis include:  Resting the foot. This involves limiting movements that make your foot hurt. You may also need to avoid certain sports and types of work for a time.  Using cold packs. Put an ice pack (wrapped in a thin towel) on the heel and foot to help reduce pain and swelling.  Taking medicines. Prescription and over-the-counter medicines can help relieve pain and swelling. NSAIDs (nonsteroidal anti-inflammatory drugs) are the most common  medicines used. They may be given as pills. Or they may be put on the skin as a gel, cream, or patch.  Using heel cups or foot inserts (orthotics). These are placed in the shoes to help support the heel or arch and cushion the heel. You may also be advised to buy proper-fitting shoes with good arch support and cushioned soles.  Taping the foot. This supports the arch and limits the movement of the plantar fascia to help ease symptoms.  Wearing a night splint. This stretches the plantar fascia and leg muscles while you sleep. This may help ease pain.  Doing exercises and physical therapy. These stretch and strengthen the plantar fascia and the muscles in the leg that support the heel and foot. Stretching your calf and plantar fascia is the most effective way to relieve pain.  Getting shots of medicine into the foot. These may help ease symptoms for a time. The shots often contain corticosteroids. These are strong anti-inflammatory medicines.  Having surgery. This may be needed if other treatments fail to relieve symptoms. During surgery, the surgeon may partially cut the plantar fascia to release tension.  Possible complications of plantar fasciitis  Without proper care and treatment, healing may take longer than normal. Also, symptoms may continue or get worse. Over time, the plantar fascia may be damaged. This can make it hard to walk or even stand without pain.   When to call your healthcare provider  Call your healthcare provider right away if you have any of these:  Fever of 100.4°F (38°C) or higher, or as directed by your provider  Chills  Symptoms that don’t get better with treatment, or get worse  New symptoms, such as numbness, tingling, or weakness in the foot  StayWell last reviewed this educational content on 6/1/2019 © 2000-2020 [email or web address]. [address] [place] [address], [place] [address]. All rights reserved. This information is not intended as a substitute for professional medical care. Always  follow your healthcare professional's instructions.        Ankle Dorsiflexion/Plantarflexion (Flexibility)    Sit on the floor or in bed with your legs straight out in front of you.  Point both feet. Then flex both feet.  Do this 10 to 30 times in a row.  Repeat this exercise 2 times a day, or as instructed.  StayWell last reviewed this educational content on 6/1/2019  © 5022-8175 [employer]. [address] [place] [address]. All rights reserved. This information is not intended as a substitute for professional medical care. Always follow your healthcare professional's instructions.        Toe Extension (Flexibility)    These instructions are for your right foot. Switch sides for your left foot.  Sit in a chair. Rest your right ankle on your left knee.  Hold your toes with your right hand. Gently bend the toes backward. Feel a stretch in the undersides of the toes and ball of the foot. Hold for 30 to 60 seconds.  Then gently bend the toes in the other direction. Gently press on them until your foot is pointed. Hold for 30 to 60 seconds.  Repeat 5 times, or as instructed.  StayWell last reviewed this educational content on 5/1/2016  © 8070-9928 [employer]. [address] [place] [address]. All rights reserved. This information is not intended as a substitute for professional medical care. Always follow your healthcare professional's instructions.        Other headache syndrome Check blood. Neck excvercises. Check blood pressure when have these headaches.  Compliant with CPAP use.      General Neck and Back Pain    Both neck and back pain are usually caused by injury to the muscles or ligaments of the spine. Sometimes the disks that separate each bone of the spine may cause pain by pressing on a nearby nerve. Back and neck pain may appear after a sudden twisting or bending force (such as in a car accident), or sometimes after a simple awkward movement. In either case,  muscle spasm is often present and adds to the pain.  Acute neck and back pain usually gets better in 1 to 2 weeks. Pain related to disk disease, arthritis in the spinal joints or spinal stenosis (narrowing of the spinal canal) can become chronic and last for months or years.  Back and neck pain are common problems. Most people feel better in 1 or 2 weeks, and most of the rest in 1 to 2 months. Most people can remain active.  People have and describe pain differently.  Pain can be sharp, stabbing, shooting, aching, cramping, or burning  Movement, standing, bending, lifting, sitting, or walking may worsen the pain  Pain can be localized to one spot or area, or it can be more generalized  Pain can spread or radiate upwards, downwards, to the front, or go down your arms  Muscle spasm may occur.  Most of the time mechanical problems with the muscles or spine cause the pain. it is usually caused by an injury, whether known or not, to the muscles or ligaments. While illnesses can cause back pain, it is usually not caused by a serious illness. Pain is usually related to physical activity, whether sports, exercise, work, or normal activity. Sometimes it can occur without an identifiable cause. This can happen simply by stretching or moving wrong, without noting pain at the time. Other causes include:  Overexertion, lifting, pushing, pulling incorrectly or too aggressively.  Sudden twisting, bending or stretching from an accident (car or fall), or accidental movement.  Poor posture  Poor conditioning, lack of regular exercise  Spinal disc disease or arthritis  Stress  Pregnancy, or illness like appendicitis, bladder or kidney infection, pelvic infections   Home care  For neck pain: Use a comfortable pillow that supports the head and keeps the spine in a neutral position. The position of the head should not be tilted forward or backward.  When in bed, try to find a position of comfort. A firm mattress is best. Try lying flat  on your back with pillows under your knees. You can also try lying on your side with your knees bent up towards your chest and a pillow between your knees.  At first, do not try to stretch out the sore spots. If there is a strain, it is not like the good soreness you get after exercising without an injury. In this case, stretching may make it worse.  Don't sit for long periods, as in long car rides or other travel. This puts more stress on the lower back than standing or walking.  During the first 24 to 72 hours after an injury, apply an ice pack to the painful area for 20 minutes and then remove it for 20 minutes over a period of 60 to 90 minutes or several times a day.   You can alternate ice and heat therapies. Talk with your healthcare provider about the best treatment for your back or neck pain. As a safety precaution, do not use a heating pad at bedtime. Sleeping with a heating pad can lead to skin burns or tissue damage.  Therapeutic massage can help relax the back and neck muscles without stretching them.  Be aware of safe lifting methods and do not lift anything over 15 pounds until all the pain is gone.  Medicines  Talk to your healthcare provider before using medicine, especially if you have other medical problems or are taking other medicines.  You may use over-the-counter medicine to control pain, unless another pain medicine was prescribed. If you have chronic conditions like diabetes, liver or kidney disease, stomach ulcers,  gastrointestinal bleeding, or are taking blood thinner medicines.  Be careful if you are given pain medicines, narcotics, or medicine for muscle spasm. They can cause drowsiness, and can affect your coordination, reflexes, and judgment. Do not drive or operate heavy machinery.  Follow-up care  Follow up with your healthcare provider, or as advised. Physical therapy or further tests may be needed.  If X-rays were taken, you will be notified of any new findings that may affect your  care.  Call 911  Call 911 if any of the following occur:  Trouble breathing  Confusion  Very drowsy or trouble awakening  Fainting or loss of consciousness  Rapid or very slow heart rate  Loss of bowel or bladder control  When to seek medical advice  Call your healthcare provider right away if any of these occur:  Pain becomes worse or spreads into your arms or legs  Weakness, numbness or pain in one or both arms or legs  Numbness in the groin area  Difficulty walking  Fever of 100.4ºF (38ºC) or higher, or as directed by your healthcare provider  Date Last Reviewed: 7/1/2016  © 4313-7017 [email or web address]. [address] [place] [address]. All rights reserved. This information is not intended as a substitute for professional medical care. Always follow your healthcare professional's instructions.        ¿Qué es la distensión cervical?    Los siete huesos (vértebras) del lawanda jose parte de la columna vertebral. Melissa tramo se conoce dalton columna cervical. Distensión cervical es un término médico que define el dolor de lawanda. El lawanda está formado por varias capas de músculos. Estos están conectados por tendones a la columna cervical y a otros huesos. El dolor de lawanda generalmente es la consecuencia de daniel lesión en estos músculos y tendones.  Causas de distensión cervical  Distintos tipos de tensión sobre el lawanda pueden dañar andie músculos y tendones (tejidos blandos) y causar distensión cervical. Los tejidos cervicales pueden resultar dañados cuando:  Se está forzando el lawanda más allá de mosquera rango de movimiento normal, dalton cuando se produce daniel lesión por  accidente automovilístico o deportivo  El lawanda está sometido a daniel tensión susan de intensidad baja, dalton cuando se adopta daniel peter postura o cuando el espacio de trabajo no está dispuesto adecuadamente  Síntomas de distensión cervical  Por ejemplo:  Dolor o rigidez del lawanda  Dolor de hombros o en la parte dorinda de la  espalda  Espasmos musculares  Dolor de david (generalmente comienza en la base del lawanda)  Irritabilidad, dificultad para concentrarse o somnolencia  Tratamiento de la distensión cervical  Melissa problema con frecuencia mejora por sí mismo. Los tratamientos apuntan a aliviar el dolor y la inflamación  y a aumentar el rango de movimiento del lawanda. Los posibles tratamientos incluyen:  Calmantes del dolor recetados o de venta devon. Ayudan a aliviar el dolor y la inflamación.  Ejercicios de estiramiento para disminuir la rigidez del lawanda.  Masajes para disminuir la rigidez del lawanda.  Compresas de frío o calor. Ayudan a reducir el dolor y la hinchazón.     Llame al 911  Llame enseguida a los servicios de emergencia si presenta cualquiera de estos síntomas:  Entumecimiento o caída de los músculos de la aram.  Entumecimiento o debilidad, especialmente en los brazos o de un solo lado  Dificultad para hablar o hablar arrastrando las palabras  Visión borrosa      Cuándo llamar a mosquera proveedor de atención médica  Llame enseguida a mosquera proveedor de atención médica si tiene alguno de los siguientes síntomas:  Fiebre de 100.4° F (38° C) o más, o según le hayan indicado  Empeoramiento del dolor o la rigidez  Síntomas que no mejoran o empeoran  Entumecimiento, hormigueo, debilidad o elsy fulgurantes que bajan a los brazos o las piernas  Síntomas nuevos  Date Last Reviewed: 3/10/2016  © 5359-6021 [employer]. [address] [place] [address], [place] [address]. All rights reserved. This information is not intended as a substitute for professional medical care. Always follow your healthcare professional's instructions.        Mentón hacia atrás (postura y fuerza)    Siéntese en daniel silla con ambos pies apoyados planos en el piso. También puede hacer melissa ejercicio de pie. Relaje los hombros.  Yolanda en línea recta hacia adelante. Suavemente deslice mosquera mentón en forma recta hacia atrás. Es un movimiento pequeño. No incline  mosquera david hacia arriba ni hacia abajo. Tampoco incline mosquera jeromy hacia adelante.  Mantenga yung muna segundos. Luego relaje.  Repita el ejercicio muna veces, o según le indiquen.     Sugerencia: No arquee la espalda ni redondee los hombros hacia adelante.   Date Last Reviewed: 5/1/2016  © 7174-9864 [employer]. [address] [place] [address]. All rights reserved. This information is not intended as a substitute for professional medical care. Always follow your healthcare professional's instructions.        Dolor De Jeromy O De Espalda [Neck/Back Pain, General]    Tanto el dolor de jeromy dalton el de espalda suelen ser consecuencia de daniel lesión en los músculos o ligamentos de la columna vertebral. Algunas veces los discos que separan cada hueso de la columna pueden producir dolor al ejercer presión sobre el nervio que está al lado. El dolor de jeromy y de espalda puede aparecer a consecuencia de daniel fuerza que produce torsión repentina (dalton en un accidente de automóvil) o de un movimiento inhabitual. En ambos casos, es frecuente que el problema vaya acompañado por espasmos musculares que contribuyen al dolor.  El dolor yvon de jeromy y de espalda suele mejorar en daniel o dos semanas. El dolor relacionado con los trastornos de los discos, la artritis en las articulaciones vertebrales o la estenosis (estrechamiento) del canal medular pueden convertirse en problemas crónicos y durar meses o años.  Cuidados En [place]:  PARA EL DOLOR DE JEROMY: Use daniel almohada cómoda que dé soporte a la david y mantenga la columna en daniel posición neutral. La david no debe estar inclinada hacia adelante ni hacia atrás.  PARA EL DOLOR DE ESPALDA: Es posible que deba permanecer en la cama los primeros días. Jessenia tan pronto pueda, debe empezar a sentarse o caminar para evitar los problemas asociados con el descanso prolongado en la cama (debilidad muscular, empeoramiento de la rigidez y el dolor en la  espalda, y coágulos sanguíneos en las piernas).  Cuando esté en la cama, trate de encontrar daniel posición cómoda. Es aconsejable que use un colchón firme. Intente acostarse boca arriba con almohadas debajo de las rodillas. También puede intentar acostarse de lado con las rodillas dobladas hacia el pecho y daniel almohada entre las rodillas.  Evite permanecer sentado yung períodos largos, ya que esto implica más esfuerzo en la parte inferior de la espalda que cuando está de pié o caminando.  Algunas personas encuentran alivio con la aplicación de calor (ducha o baño caliente, o daniel compresa caliente) y masajes, mientras que otras prefieren aplicar frío (hielo scott o en cubos dentro de daniel bolsa envuelta en daniel toalla). Pruebe ambos métodos y use el que le dé mejor resultado yung 20 minutos varias veces al día.  Puede usar acetaminofén (Tylenol) o ibuprofeno (Motrin o Advil) para controlar el dolor, a menos que le hayan recetado otro medicamento. [NOTA: Si tiene daniel enfermedad hepática o renal crónica, o ha tenido alguna vez daniel úlcera estomacal o sangrado gastrointestinal, consulte con mosquera médico antes de kami estos medicamentos.]  Aprenda las técnicas adecuadas para levantar objetos y no levante nada que pese más de 15 libras hasta que el dolor haya desaparecido por completo.  Programe daniel VISITA DE CONTROL con mosquera médico o con aury centro si los síntomas no empiezan a mejorar después de daniel semana. Es posible que necesite fisioterapia o pruebas adicionales.  [NOTA: Si le hicieron radiografías, éstas serán examinadas por un radiólogo y le informarán de los nuevos hallazgos que puedan afectar la atención médica que necesita.]  Busque Prontamente Atención Médica  si algo de lo siguiente ocurre:  El dolor empeora o se extiende a los brazos o a las piernas  Debilidad, entumecimiento o dolor en amanda o ambos brazos o piernas  Pérdida del control del intestino o de la vejiga  Entumecimiento en la jennifer de las  ingles  Dificultad para caminar  Fiebre de 100.4°F (38°C) o más dorinda, o dalton le haya indicado mosquera proveedor de atención médica  Date Last Reviewed: 7/1/2016  © 4329-8372 [facility]. [address] [place] [address]. Todos los derechos reservados. Esta información no pretende sustituir la atención médica profesional. Sólo mosquera médico puede diagnosticar y tratar un problema de kellie.        Ejercicio de estirar y mantener       Apoye andie maikel y andie rodillas en el suelo para hacer aury ejercicio. Mantenga las rodillas debajo de las caderas y las maikel debajo de los hombros. Mantenga la columna en posición neutral (sin arquearla hacia arriba o hacia abajo). Mantenga los hombros alineados con las orejas. Sostenga esta posición por unos segundos antes de empezar el ejercicio.  Contraiga los músculos del abdomen y levante un brazo directamente frente a usted, con la ovalles de la mano hacia abajo. Sostenga la posición por muna segundos, luego baje el brazo. Repita esto entre 10 y 20 veces.  Repita el ejercicio, esta vez levantando el brazo hacia un lado. Repita esto entre 10 y 20 veces.  Repita el ejercicio, esta vez levantando el brazo hacia atrás y con la ovalles de la mano hacia arriba. Repita esto entre 10 y 20 veces.  Cambie de lado y repita cada ejercicio con el otro brazo.  Date Last Reviewed: 11/1/2017 © 2000-2019 [facility]. [address] [place] [address]. Todos los derechos reservados. Esta información no pretende sustituir la atención médica profesional. Sólo mosquera médico puede diagnosticar y tratar un problema de kellie.        Estiramiento del hombro y la parte superior de la espalda  Para comenzar, párese mike erguido, con los oídos, los hombros y las caderas alineados. Debe tener los pies ligeramente separados, colocados portia debajo de las caderas. Enfoque la vista directamente hacia adelante. Párese en esta posición yung unos segundos antes de comenzar el  ejercicio; esto le ayudará a ser más consciente de la postura correcta.       Extienda los brazos por encima de la david y ligeramente hacia atrás. Deje los hombros y el lawanda alineados, y los codos detrás de los hombros.  Con las nicola orientadas hacia el techo, gire los dedos hacia adentro.  Respire hondo. Expulse el aire y baje los codos hacia las nalgas. Sostenga la posición por 5 segundos, luego vuelva a la posición inicial.  Repita esto 3 veces.  Date Last Reviewed: 11/1/2017 © 2000-2019 [employer]. [address] [place] [address]. Todos los derechos reservados. Esta información no pretende sustituir la atención médica profesional. Sólo mosquera médico puede diagnosticar y tratar un problema de kellie.        Ejercicio de rotación de hombros    Para comenzar, párese mike erguido, con los oídos, los hombros y las caderas alineados. Debe tener los pies ligeramente separados, colocados portia debajo de las caderas. Enfoque la vista directamente hacia adelante. Párese en esta posición yung unos segundos antes de comenzar el ejercicio. [place] le ayudará a ser más consciente de la postura correcta.  Imagínese que mosquera hombro derecho es el centro de un reloj. Con la parte más externa del hombro, trace lentamente el borde exterior del reloj.  Muévase stephen en el sentido de las agujas del reloj, luego en sentido contrario.  Repita esto de caroline a muna veces. Cambie de hombro.   Date Last Reviewed: 3/1/2018  © 7230-1285 [employer]. [address] [place] [address]. Todos los derechos reservados. Esta información no pretende sustituir la atención médica profesional. Sólo mosquera médico puede diagnosticar y tratar un problema de kellie.        Ejercicios para los hombros:       Para empezar, siéntese en daniel silla, apoyando las plantas de los pies en el suelo. Mosquera peso debe estar desplazado ligeramente hacia adelante, de modo que mosquera cuerpo esté mike balanceado sobre los glúteos.  Relaje los hombros y mantenga la david mike nivelada. No encorve la espalda ni alce los hombros. Sentarse en daniel silla con brazos puede ayudarlo a mantener el equilibrio.  Con los codos flexionados, levante los brazos hasta llevarlos al nivel de los hombros.  Aproxime lentamente andie antebrazos hasta que se junten. Sostenga la posición yung muna segundos.  Vuelva a la posición inicial. Repita esto muna veces.  Date Last Reviewed: 3/1/2018  © 3449-8420 [employer]. [address] [place] [address]. Todos los derechos reservados. Esta información no pretende sustituir la atención médica profesional. Sólo mosquera médico puede diagnosticar y tratar un problema de kellie.        Encogimiento de hombros    Para empezar, siéntese en daniel silla, apoyando las plantas de los pies en el suelo. Desplace mosquera peso ligeramente hacia adelante para no arquear la espalda. Relájese. Mantenga los oídos, los hombros y las caderas alineados entre sí.  Eleve los dos hombros hasta donde pueda, dalton si estuviera intentando tocarlos con los oídos. Mantenga la david y el lawanda inmóviles y relajados.  Sostenga esta posición mientras cuenta hasta 10. Relájese.  Repita esto 5 veces.  Para mosquera propia seguridad, consulte con mosquera proveedor de atención médica antes de iniciar cualquier programa de ejercicios.   Date Last Reviewed: 11/1/2017 © 2000-2019 [employer]. [address] [place] [address]. Todos los derechos reservados. Esta información no pretende sustituir la atención médica profesional. Sólo mosquera médico puede diagnosticar y tratar un problema de kellie.        Aproximación de hombros    Para empezar, siéntese en daniel silla, apoyando las plantas de los pies en el suelo. Desplace mosquera peso ligeramente hacia adelante para no arquear la espalda. Relájese. Mantenga los oídos, los hombros y las caderas alineados entre sí.  Eleve los brazos a la altura de los hombros, con los codos doblados y las nicola de  las maikle hacia adelante.  Mueva los brazos hacia atrás, aproximando los omóplatos entre sí.  Sostenga la posición yung 10 segundos. Vuelva a la posición inicial.  Repita esto 5 veces.   Para mosquera propia seguridad, consulte con mosquera proveedor de atención médica antes de iniciar cualquier programa de ejercicios.   Date Last Reviewed: 11/1/2017 © 2000-2019 [employer]. [address] [place] [address]. Todos los derechos reservados. Esta información no pretende sustituir la atención médica profesional. Sólo mosquera médico puede diagnosticar y tratar un problema de kellie.        Ejercicios para el lawanda: Rotación activa del lawanda     Para comenzar, acuéstese boca arriba con las rodillas flexionadas y las plantas de los pies apoyadas en el suelo. Mantenga alineadas las orejas, los hombros y las caderas, francisco no empuje la parte inferior de la espalda contra el piso. Apóyese las maikel en la pelvis. Respire hondo y relájese.  Use los músculos del lawanda para girar la david hacia un lado hasta sentir el estiramiento en los músculos.  Sostenga la posición yung 5 segundos. A continuación gire la david hacia el otro lado.  Repita el ejercicio 5 veces en cada lado.  Nota: Mantenga los hombros apoyados contra el piso. No levante o repliegue la barbilla mientras gira la david.  Date Last Reviewed: 11/1/2017 © 2000-2019 [employer]. [address] [place] [address]. Todos los derechos reservados. Esta información no pretende sustituir la atención médica profesional. Sólo mosquera médico puede diagnosticar y tratar un problema de kellie.        Ejercicios para el lawanda: Levantamientos de brazos            Para comenzar, acuéstese boca arriba con las rodillas flexionadas y las plantas de los pies apoyadas en el suelo. Mantenga alineadas las orejas, los hombros y las caderas, francisco no empuje la parte inferior de la espalda contra el piso. Apoye andie maikel en la pelvis. Respire hondo y  relájese. Contraiga los músculos abdominales para evitar que la espalda se arquee.  Levante un brazo por encima de la david, luego bájelo. A medida que va bajando efrain brazo, levante el otro brazo.  Siga moviendo los dos brazos, formando roxi con un movimiento lento y continuo. Mantenga los brazos rectos, y la david y el lawanda relajados.  Repita el ejercicio 10 veces con cada brazo.  Para mosquera propia seguridad, consulte con mosquera proveedor de atención médica antes de iniciar cualquier programa de ejercicios.   Date Last Reviewed: 11/1/2017  © 6047-7948 [employer]. [address] [place] [address]. Todos los derechos reservados. Esta información no pretende sustituir la atención médica profesional. Sólo mosquera médico puede diagnosticar y tratar un problema de kellie.        Ejercicios para el lawanda: Rotación pasiva del lawanda    Para comenzar, acuéstese boca arriba con las rodillas flexionadas y las plantas de los pies apoyadas en el suelo. Mantenga alineadas las orejas, los hombros y las caderas, francisco no empuje la parte inferior de la espalda contra el piso. Apóyese las maikel en la pelvis. Respire hondo y relájese.  Con el lawanda relajado, colóquese la ovalles de daniel mano en la frente. Use la mano para hacer girar la david hacia un lado hasta sentir el estiramiento en los músculos. No christina aury ejercicio si siente dolor.  Sostenga la posición yung 5 segundos. A continuación gire la david hacia el otro lado.  Repita el ejercicio 5 veces en cada lado.  Nota: Mantenga los hombros apoyados contra el piso. No levante la barbilla mientras gira la david.  Date Last Reviewed: 11/1/2017 © 2000-2019 [employer]. [address] [place] [address]. Todos los derechos reservados. Esta información no pretende sustituir la atención médica profesional. Sólo mosquera médico puede diagnosticar y tratar un problema de kellie.        Rotación externa, ejercicio isométrico (fuerza)    Flexione mosquera  brazo derecho frente a mosquera cuerpo con la ovalles de la mano hacia arriba. Sujete mosquera daija derecha con mosquera mano izquierda.  Empuje mosquera brazo derecho hacia afuera mientras trata de jalar hacia atrás con mosquera brazo mikael. Intente igualar las fuerzas de modo que ninguno de los brazos se mueva. Empuje y jale con los dos brazos firmemente en sentidos opuestos.  Mantenga yung muna segundos. Luego relaje.  Repita muna veces.  Ronna aury ejercicio caroline veces por día o según le hayan indicado.  Date Last Reviewed: 3/10/2016  © 6624-6489 [email or web address]. [address] [place] [address]. All rights reserved. This information is not intended as a substitute for professional medical care. Always follow your healthcare professional's instructions.        Inclinación de la david / estiramiento del trapecio superior (flexibilidad)    Siéntese derecho en daniel silla con mosqeura david y lawanda en daniel posición neutral, alineando las orejas con los hombros. Tómese del borde del asiento de mosquera silla con mosquera mano derecha. [place] levemente mosquera barbilla.  Incline mosquera david hacia la izquierda mirando hacia adelante.  Coloque mosquera mano izquierda sobre el lado derecho de mosquera david. Empuje mosquera david suavemente hacia la izquierda. Sostenga por 30 a 60 segundos. Aplique daniel presión suave para aumentar el estiramiento. No fuerce mosquera david a la posición.  Regrese mosquera david y lawanda a la posición neutral.  Repita aury ejercicio dos veces o según las instrucciones que haya recibido.  Cambie de lado y repita dos veces o según las instrucciones que haya recibido.  Desafíese  [place] un extremo de daniel toalla debajo de mosquera brazo mikael. Luego lleve el otro extremo hasta cubrir mosquera hombro derecho y también meta la toalla debajo de aury. Jale de la toalla hacia abajo de mosquera lado derecho usando andie dos maikel a medida que gira mosquera david hacia la izquierda. Repita del otro lado.   Date Last Reviewed: 3/10/2016  © 2409-0281 [employer]  [address] [place] [address]. All rights reserved. This information is not intended as a substitute for professional medical care. Always follow your healthcare professional's instructions.        Ejercicios para el aurora: Ejercicios isométricos del aurora   Para empezar, siéntese en daniel silla, apoyando las plantas de los pies en el suelo. Mosquera peso debe estar desplazado ligeramente hacia adelante, de modo que mosquera cuerpo esté mike balanceado sobre los glúteos. Relaje los hombros y mantenga la david mike nivelada. Sentarse en daniel silla con brazos puede ayudarlo a mantener el equilibrio.       Presione la ovalles de la mano contra la frente. [place] resistencia con los músculos del aurora. Siga empujando con la mano yung 10 segundos y luego relájese. Repita 5 veces.  Vuelva a hacer el ejercicio, empujando con la mano la parte lateral de la david. Repita esto 5 veces. Cambie de lado.  Vuelva a hacer el ejercicio, empujando con la mano la parte posterior de la david. Repita esto 5 veces.  Para mosquera propia seguridad, consulte con mosquera proveedor de atención médica antes de iniciar cualquier programa de ejercicios.   Date Last Reviewed: 11/1/2017  © 0712-2125 [employer]. [address] [place] [address]. Todos los derechos reservados. Esta información no pretende sustituir la atención médica profesional. Sólo mosquera médico puede diagnosticar y tratar un problema de kellie.        Dolor En El Aurora [Neck Pain, No Trauma]  Hay varias causas posibles por las que el aurora puede doler sin que haya lesión:  Un pequeño movimiento repentino del aurora puede provocarle un esguince (distención [sprain]) jordana de los ligamentos o de los músculos. Dormir con el aurora apoyado en daniel posición incómoda también puede causarle eso.  Ante el estrés emocional, algunas personas tensan los músculos del aurora, de los hombros y de la parte superior de la espalda . El espasmo crónico (chronic spasm) de esos  músculos puede causar dolor en el lawanda y, en ocasiones, dolor de david (headache).  El “desgaste” gradual de las articulaciones de la columna pueden causar daniel “artritis degenerativa” (degenerative arthritis). Puede ocasionarle dolor en el lawanda en forma crónica u ocasional.  Con la edad, o con pequeñas lesiones reiteradas en el lawanda, los discos vertebrales (los separadores acolchados que se encuentran entre los huesos de la columna) pueden agrandarse y ejercer presión contra algún nervio cercano de la columna. Eso provoca hormigueo (tingling), dolor o adormecimiento (numbness) que se extiende desde el lawanda al hombro, el brazo o la mano de un lado.  El dolor yvon del lawanda suele mejorar en daniel a dos semanas. El dolor de lawanda relacionado con alguna afección de los discos (disk disease), artritis (arthritis) en las articulaciones vertebrales o estenosis vertebral (spinal stenosis) [reducción del canal leigh] puede volverse crónico y durar meses o años.  A menos que usted haya tenido daniel lesión física por impacto (por ejemplo, daniel caída o un accidente de automóvil), no suelen pedirse radiografías (X-rays) para la evaluación inicial del dolor de lawanda. Si el dolor persiste y no responde al tratamiento médico, es posible que le soliciten radiografías (X-rays) y otras pruebas más adelante.  Cuidados En [place]:  Descanse y relaje los músculos. Emplee daniel almohada cómoda para apoyar la david y mantener la columna en daniel posición neutra. La david no debería quedar inclinada hacia adelante ni hacia atrás. Es posible que daniel toalla enrollada le brinde un buen apoyo.  Algunas personas encuentran alivio al aplicarse calor (daniel ducha caliente, un baño caliente o daniel almohadilla térmica) y recibir un masaje , mientras que otras personas prefieren compresas frías (hielo en cubos o scott colocado en daniel bolsa plástica, envuelta en daniel toalla). Pruebe ambos métodos y use el que mejor resultado le dé yung 20  minutos varias veces al día.  Puede usar acetaminofén [acetaminophen] (Tylenol) o ibuprofeno [ibuprofen] (Motrin o Advil) para controlar el dolor, a menos que le hayan recetado otro medicamento. [ NOTA : Si tiene daniel enfermedad hepática o renal crónica (chronic liver or kidney disease), o ha tenido alguna vez daniel úlcera estomacal (stomach ulcer) o sangrado gastrointestinal (GI bleeding), consulte con mosquera médico antes de kami estos medicamentos.]  Programe daniel VISITA DE CONTROL con mosquera médico o aury centro si los síntomas no empiezan a mejorar después de daniel semana. Quizás necesite recibir fisioterapia (physical therapy) o hacerse otras pruebas.  [NOTA: Si le guerra hecho radiografías (X-rays) o daniel tomografía computarizda (CT scan), éstas serán evaluadas por un especialista. Le informaremos de los nuevos hallazgos que puedan afectar la atención médica que necesita.]  Busque Prontamente Atención Médica  si algo de lo siguiente ocurre:  El dolor empeora o se esparce a amanda o ambos brazos.  Siente debilidad o entumecimiento en amanda o ambos brazos.  Dolor de david que va en aumento.  Hinchazón del lawanda, dificultad o dolor al tragar.  Fiebre de 100.4°F (38°C) o más dorinda, o dalton le haya indicado mosquera proveedor de atención médica.  Date Last Reviewed: 7/1/2016  © 8142-8642 [employer]. [address] [place] [address], [place] [address]. Todos los derechos reservados. Esta información no pretende sustituir la atención médica profesional. Sólo mosquera médico puede diagnosticar y tratar un problema de kellie.        Los trastornos del lawanda        Si usted tiene dolor de lawanda, no es el único: muchas personas tienen dolor de lawanda en algún momento de mosquera rashaad. Ciertos problemas dalton las malas posturas, algunas lesiones y el desgaste natural del cuerpo pueden ocasionar dolor de lawanda. Mosquera proveedor de atención médica colaborará con usted para encontrar el tratamiento más adecuado para mosquera lawanda.  Tipos de problemas del  lawanda    El dolor y las lesiones en el lawanda pueden ser consecuencia de los siguientes problemas:  Distensiones y esguinces: Las distensiones (estiramientos o desgarros de los músculos) y los esguinces (estiramientos o desgarros de los ligamentos) pueden ocasionar elsy de lawanda. Las distensiones y los esguinces pueden producirse yung un accidente o cuando el lawanda se somete a sobreuso por movimientos repetitivos; también pueden provocar la inflamación (hinchazón y dolor) de los músculos y ligamentos.  Latigazo cervical y otras lesiones: Pueden producirse latigazos cervicales cuando un impacto sacude la david de un modo que obliga al lawanda a doblarse demasiado hacia adelante (hiperflexión) y luego a retroceder excesivamente (hiperextensión). Estos dos movimientos, al combinarse, pueden causar lesiones dolorosas en distintas partes del lawanda, dalton los músculos, los ligamentos o las articulaciones. La causa más común de los latigazos cervicales son los accidentes de automóvil, aunque también pueden ocurrir yung daniel caída o daniel lesión al practicar un deporte.  Discos debilitados: Un acto simple dalton un estornudo o daniel tos puede causar la protuberancia (hernia) de un disco. Un disco herniado puede ejercer presión sobre el nervio y producir dolor. Con el tiempo, los discos también pueden sufrir degeneración (pérdida de espesor). Los discos aplanados no amortiguan mike las vértebras y pueden causar que éstas se froten entre sí, comprimiendo los nervios y causando dolor.  Articulaciones debilitadas: El envejecimiento y las lesiones pueden producir degeneración gradual de las articulaciones. La degeneración de las articulaciones también puede producir fricción entre las vértebras. [place] puede causar la formación de masas anormales de hueso (llamadas “espolones óseos”) en las vértebras. Los espolones óseos también puede presionar los nervios y provocar dolor.  Síntomas comunes  Si tiene algún problema en el  lawanda, es posible que tenga amanda o más de los siguientes síntomas:  Tensión muscular y espasmos: Es posible que no pueda  el lawanda, los brazos o los hombros cómodamente si tiene tensión muscular o rigidez en el lawanda. Si andie síntomas no se alivian, es posible que sienta espasmos musculares o masas de tejido contraído (puntos gatillo) en ciertas zonas del lawanda y de los hombros.  Dolor  El dolor sordo en la david o el lawanda, los elsy agudos y la hinchazón del tejido blando en el lawanda y los hombros son síntomas comunes. Si tiene presión en los nervios del lawanda, es posible que sienta dolor en los brazos o en las maikel (dolor referido).  Entumecimiento o debilidad: Si los nervios de mosquera lawanda están lesionados, es posible que sienta entumecimiento, hormigueo o debilidad en los hombros, los brazos o las maikel. Estos síntomas surgen cuando los discos o los espolones óseos comprimen los nervios del lawanda.  Date Last Reviewed: 10/1/2017  © 4551-8407 [employer]. [address] [place] [address]. Todos los derechos reservados. Esta información no pretende sustituir la atención médica profesional. Sólo mosquera médico puede diagnosticar y tratar un problema de kellie.        Protección del lawanda: Postura y mecánica corporal    Proteger el lawanda de las lesiones y del dolor requiere practicar el mantenimiento de daniel buena postura y mecánica corporal. [place] puede requerir la corrección de malos hábitos relacionados con la manera en que usted mueve y sostiene mosquera cuerpo. Los siguientes consejos pueden ayudarle a mejorar mosquera postura y mecánica corporal.  ¿Qué es la postura y por qué es importante?  La postura es el modo en que está puesto el cuerpo. Para muchos de nosotros, esto significa a veces encorvarse con la barbilla inclinada hacia adelante y con los hombros caídos. Jessenia aury tipo de postura impide que los músculos le den un apoyo adecuado al lawanda, y tensiona los músculos, discos,  ligamentos y articulaciones del lawanda. Jaconita puede traer dalton resultado lesiones y dolor.  ¿Cómo es mosquera postura?  Para revisar mosquera postura, use un katy de cuerpo entero. Para comenzar, póngase de pie de manera normal. A continuación, retroceda lentamente hasta ponerse con la espalda contra la pared. ¿Hay espacio entre mosquera david y la pared? ¿Kym caer los hombros? ¿Tiene la barbilla orientada hacia arriba o hacia abajo? Todos estos factores pueden provocar dolor y lesiones en el lawanda.  Cómo mejorar mosquera postura  Para mejorar mosquera postura, siga estos pasos:  Empuje los hombros hacia atrás.  Piense en las orejas, los hombros y las caderas dalton si fueran daniel serie de puntos. Luego, ajuste el cuerpo para tratar de conectar los puntos en daniel línea recta.  Mantenga nivelada la barbilla.  ¿Qué es la mecánica corporal y por qué es importante?  El modo en que usted se mueve y coloca el cuerpo yung andie actividades diarias recibe el nombre de mecánica corporal. La buena mecánica corporal ayuda a proteger el lawanda. Jaconita implica aprender las posiciones correctas para estar de pie, sentarse e incluso dormir. Por lo tanto, ronna lo que es mejor para mosquera lawanda y practique daniel buena mecánica corporal.  Al estar de pie   Para protegerse el lawanda al estar de pie:  Transporte los objetos cerca del cuerpo.  Mantenga los oídos y los hombros alineados mientras está de pie o camina.  Para agacharse, doble las piernas por la rodilla manteniendo la espalda recta. Ronna esto en lugar de mirar hacia abajo y estirarse para alcanzar los objetos.  Trabaje al nivel de los ojos. No estire los brazos por encima de la david ni incline la david hacia atrás.  Al estar sentado   Para protegerse el lawanda al estar sentado:  Disponga mosquera estación de trabajo de modo que la pantalla de mosquera computadora le quede al nivel de los ojos. Además, use un soporte para documentos a la hora de leer papeles o libros.  Mantenga las rodillas al nivel de las caderas o  levemente más abajo.  Siéntese derecho, con las plantas de los pies apoyadas en el piso. Si los pies no le llegan al piso, use un reposapiés.  No permanezca sentado ni manejando yung largos períodos. [place] descansos frecuentes.  Al dormir   Para protegerse el lawanda al dormir:  Duerma boca arriba con daniel almohada debajo de las rodillas, o sobre un costado con daniel almohada entre las rodillas. [place] le ayudará a alinear la columna vertebral.  No use almohadas que peter demasiado altas ni demasiado bajas. En vez de ello, use un cojín cilíndrico o daniel almohada debajo del lawanda mientras duerme para mantener el lawanda recto.  Duerma sobre un colchón que le dé buen apoyo.  Date Last Reviewed: 10/1/2017  © 8685-5740 [employer]. [address] [place] [address]. Todos los derechos reservados. Esta información no pretende sustituir la atención médica profesional. Sólo mosquera médico puede diagnosticar y tratar un problema de kellie.             Orders Placed This Encounter   Procedures    Lipid Panel    Comp Metabolic Panel (14)    Vitamin D    Hemoglobin A1C    URINALYSIS, AUTO, W/O SCOPE    Jose [facility]well (Automated)    Advance Care Planning- 1st 30 minutes       Meds This Visit:  Requested Prescriptions     Signed Prescriptions Disp Refills    Amitriptyline HCl 100 MG Oral Tab 90 tablet 3     Sig: Take 1 tablet (100 mg total) by mouth nightly.       Imaging & Referrals:  None      RTC 6 months for FU HTN.

## 2024-11-27 NOTE — PROGRESS NOTES
HPI:    Patient ID: Citlalli Najera is a 74 year old female.    Citlalli Najera is a 74 year old female who presents for a complete physical exam.   Citlalli Najera is a 74 year old female who presents for a Medicare Assessment.     Chief Complaint   Patient presents with    Well Adult     Medicare         Labs:   Lab Results   Component Value Date/Time    WBC 8.2 05/10/2024 07:20 AM    HGB 14.1 05/10/2024 07:20 AM    .0 05/10/2024 07:20 AM      Lab Results   Component Value Date/Time    GLU 94 05/10/2024 07:20 AM     05/10/2024 07:20 AM    K 3.9 05/10/2024 07:20 AM     05/10/2024 07:20 AM    CO2 29.0 05/10/2024 07:20 AM    CREATSERUM 0.79 05/10/2024 07:20 AM    CA 9.1 05/10/2024 07:20 AM    ALB 4.0 05/10/2024 07:20 AM    TP 6.4 05/10/2024 07:20 AM    ALKPHO 80 05/10/2024 07:20 AM    AST 35 (H) 05/10/2024 07:20 AM    ALT 45 05/10/2024 07:20 AM    BILT 0.5 05/10/2024 07:20 AM    TSH 0.730 09/29/2022 11:40 AM        Lab Results   Component Value Date/Time    CHOLEST 187 05/09/2024 11:47 AM    HDL 60 (H) 05/09/2024 11:47 AM    TRIG 208 (H) 05/09/2024 11:47 AM    LDL 92 05/09/2024 11:47 AM    NONHDLC 127 05/09/2024 11:47 AM       Lab Results   Component Value Date/Time    A1C 6.3 (H) 10/28/2022 02:18 PM      Lab Results   Component Value Date    VITD 47.5 11/18/2023         Health Maintenance   Topic Date Due    Mammogram  06/18/2025       Allergies:Allergies[1]  Current Outpatient Medications   Medication Sig Dispense Refill    Amitriptyline HCl 100 MG Oral Tab Take 1 tablet (100 mg total) by mouth nightly. 90 tablet 3    clotrimazole-betamethasone 1-0.05 % External Cream APPLY 1 APPLICATION TOPICALLY EVERY 12 HOURS 60 g 3    ketoconazole 2 % External Shampoo Apply 1 mL topically twice a week. 120 mL 1    diazePAM 10 MG Oral Tab 1-2 pills 15 min before MRI 2 tablet 0    montelukast 10 MG Oral Tab Take 1 tablet (10 mg total) by mouth nightly as needed. 90 tablet 3    diazePAM (VALIUM) 2 MG Oral Tab Take  1 tablet (2 mg total) by mouth every 8 (eight) hours as needed (vertigo). 30 tablet 0    meclizine 25 MG Oral Tab Take 1 tablet (25 mg total) by mouth 3 (three) times daily as needed. 15 tablet 0    celecoxib 100 MG Oral Cap Take 1 capsule (100 mg total) by mouth 2 (two) times daily. 180 capsule 1    hydrocortisone (PROCTO-MED HC) 2.5 % External Cream Place 1 Application rectally 2 (two) times daily. 28 g 1    betamethasone dipropionate 0.05 % External Cream Apply 1 Application  topically 2 (two) times daily. APPLY TO AFFECTED AREA 45 g 3    clobetasol 0.05 % External Cream Apply 1 Application  topically 2 (two) times daily. 45 g 0    albuterol (PROAIR HFA) 108 (90 Base) MCG/ACT Inhalation Aero Soln Inhale 2 puffs into the lungs every 4 (four) hours as needed for Wheezing. 1 each 0    pantoprazole 40 MG Oral Tab EC Take 1 tablet (40 mg total) by mouth before breakfast. 90 tablet 3    ALREX 0.2 % Ophthalmic Suspension         Past Medical History:    Acid reflux disease    Breast mass in female    Difficulty sleeping    Elevated liver enzymes    Fatty liver disease, nonalcoholic    Pneumonia due to organism    Postmenopausal bleeding    Prediabetes    Submucous uterine fibroid    Vaccine counseling    Visual impairment    glasses      Past Surgical History:   Procedure Laterality Date    Breast surgery Right 2000    Removed Mass from breast two times Benign.     Colonoscopy  10/2018    Colonoscopy N/A 10/18/2018    Procedure: COLONOSCOPY, POSSIBLE BIOPSY, POSSIBLE POLYPECTOMY 90319;  Surgeon: Willis Nelson MD;  [place] [facility] [place] [facility] localization wire 1 site left (cpt=19281)      Needle biopsy left        Family History   Problem Relation Age of Onset    Diabetes Mother       Social History:  Social History     Socioeconomic History    Marital status:    Occupational History    Occupation: Retired from [employer].    Tobacco Use    Smoking status: Former     Current packs/day:  0.00     Average packs/day: 1 pack/day for 40.0 years (40.0 ttl pk-yrs)     Types: Cigarettes     Start date:      Quit date:      Years since quittin.9    Smokeless tobacco: Never   Vaping Use    Vaping status: Never Used   Substance and Sexual Activity    Alcohol use: No    Drug use: No       History/Other:     Patient Active Problem List   Diagnosis    Elevated liver enzymes    Obstructive sleep apnea syndrome    High cholesterol    Prediabetes    Fatty liver disease, nonalcoholic    Acid reflux disease    Well woman exam with routine gynecological exam    Shoulder arthritis    Cervical radiculopathy    Breast cancer screening by mammogram    Spondylosis of lumbar region without myelopathy or radiculopathy    Insomnia    Vitamin D deficiency, unspecified     Polyarthralgia       GYNE HISTORY:  OB History    Para Term  AB Living   3 3 3 0 0 3   SAB IAB Ectopic Multiple Live Births   0 0 0 0 0        No LMP recorded. Patient is postmenopausal.      Hypertension  Patient is here for follow up of hypertension. BP at home: not check.  Not check.   Has been compliant with medications.  Exercise level: exercises 5 times a  week (and has been following low salt diet.  Weight has been down. Club walking X 45 minutes). Cooks more. No added salt.   Wt Readings from Last 3 Encounters:   24 205 lb 6.4 oz (93.2 kg)   24 212 lb (96.2 kg)   24 212 lb (96.2 kg)     BP Readings from Last 3 Encounters:   24 127/63   24 146/77   24 128/73     Labs:   Lab Results   Component Value Date/Time    GLU 94 05/10/2024 07:20 AM     05/10/2024 07:20 AM    K 3.9 05/10/2024 07:20 AM     05/10/2024 07:20 AM    CO2 29.0 05/10/2024 07:20 AM    CREATSERUM 0.79 05/10/2024 07:20 AM    CA 9.1 05/10/2024 07:20 AM    AST 35 (H) 05/10/2024 07:20 AM    ALT 45 05/10/2024 07:20 AM    TSH 0.730 2022 11:40 AM        Lab Results   Component Value Date/Time    CHOLEST 187  2024 11:47 AM    HDL 60 (H) 2024 11:47 AM    TRIG 208 (H) 2024 11:47 AM    LDL 92 2024 11:47 AM    NONHDLC 127 2024 11:47 AM            Tobacco:  She smoked tobacco in the past but quit greater than 12 months ago.  Social History     Tobacco Use   Smoking Status Former    Current packs/day: 0.00    Average packs/day: 1 pack/day for 40.0 years (40.0 ttl pk-yrs)    Types: Cigarettes    Start date:     Quit date:     Years since quittin.9   Smokeless Tobacco Never        CAGE Alcohol Screen:   CAGE screening score of 0 on 2024, showing low risk of alcohol abuse.        Patient Care Team:  Stefani Argueta MD as PCP - General (Internal Medicine)  Willis Nelson MD (GASTROENTEROLOGY)  Maxwell Garcia MD as Consulting Physician (SURGERY, ORTHOPEDIC)  Candido Berry MD (OBSTETRICS & GYNECOLOGY)  Oswald Schroeder MD (Physical Medicine)  Chantal Pope RN as [facility] TCM Care Manager  Roger Banks MD as Consulting Physician (Cardiovascular Diseases)  Has seen podiatrist for right heel pain.  Wearing shoes in the house or sandals that have a strap.  Does not walk barefoot.  More notices when does exercises.  Pressure on it.  Awaiting on orthotics that podiatrist had recommended.    Headache   This is a new problem. The current episode started more than 1 month ago. The problem occurs intermittently. The problem has been waxing and waning. The pain is located in the Left unilateral region. The pain does not radiate. The pain quality is not similar to prior headaches. The quality of the pain is described as aching. The pain is at a severity of 3/10. The pain is mild. Pertinent negatives include no abdominal pain, abnormal behavior, blurred vision, coughing, dizziness, drainage, ear pain, eye pain, eye redness, eye watering, facial sweating, fever, hearing loss, insomnia, loss of balance, muscle aches, nausea, neck pain, numbness, phonophobia, photophobia,  rhinorrhea, scalp tenderness, seizures, sinus pressure, sore throat, swollen glands, tingling, tinnitus, visual change, vomiting or weakness. Nothing aggravates the symptoms. She has tried acetaminophen for the symptoms. The treatment provided significant relief. Her past medical history is significant for hypertension and obesity. There is no history of cluster headaches, immunosuppression, sinus disease or TMJ.       Review of Systems   Constitutional: Negative.  Negative for activity change, appetite change, chills, diaphoresis, fatigue, fever and unexpected weight change.   HENT:  Negative for congestion, dental problem, drooling, ear discharge, ear pain, facial swelling, hearing loss, mouth sores, nosebleeds, postnasal drip, rhinorrhea, sinus pressure, sinus pain, sneezing, sore throat, tinnitus, trouble swallowing and voice change.    Eyes:  Negative for blurred vision, photophobia, pain, discharge, redness, itching and visual disturbance.   Respiratory: Negative.  Negative for apnea, cough, choking, chest tightness, shortness of breath, wheezing and stridor.    Cardiovascular: Negative.  Negative for chest pain, palpitations and leg swelling.   Gastrointestinal:  Negative for abdominal distention, abdominal pain, anal bleeding, blood in stool, constipation, diarrhea, nausea, rectal pain and vomiting.   Endocrine: Negative for cold intolerance, heat intolerance, polydipsia, polyphagia and polyuria.   Genitourinary:  Negative for decreased urine volume, difficulty urinating, dysuria, flank pain, frequency, hematuria, menstrual problem, pelvic pain, urgency, vaginal bleeding, vaginal discharge and vaginal pain.   Musculoskeletal:  Negative for neck pain.   Skin:  Negative for rash.   Neurological:  Positive for headaches. Negative for dizziness, tingling, tremors, seizures, syncope, facial asymmetry, speech difficulty, weakness, light-headedness, numbness and loss of balance.   Hematological:  Negative for  adenopathy.   Psychiatric/Behavioral:  Negative for agitation, behavioral problems, confusion, decreased concentration, dysphoric mood, hallucinations, self-injury, sleep disturbance and suicidal ideas. The patient is not nervous/anxious, does not have insomnia and is not hyperactive.    All other systems reviewed and are negative.          Functional Ability/Status:   Citlalli Najera has some abnormal functions as listed below:  She has difficulties Affording Meds based on screening of functional status.         Fall Risk Assessment:   She has been screened for Falls and is low risk.        Medicare Hearing Assessment:   Hearing Screening    Screening Method: Questionnaire  I have a problem hearing over the telephone: No I have trouble following the conversations when two or more people are talking at the same time: No   I have trouble understanding things on the TV: No I have to strain to understand conversations: No   I have to worry about missing the telephone ring or doorbell: No I have trouble hearing conversations in a noisy background such as a crowded room or restaurant: No   I get confused about where sounds come from: No I misunderstand some words in a sentence and need to ask people to repeat themselves: No   I especially have trouble understanding the speech of women and children: No I have trouble understanding the speaker in a large room such as at a meeting or place of [employer]: No   Many people I talk to seem to mumble (or don't speak clearly): No People get annoyed because I misunderstand what they say: No   I misunderstand what others are saying and make inappropriate responses: No I avoid social activities because I cannot hear well and fear I will reply improperly: No   Family members and friends have told me they think I may have hearing loss: No             Depression Screening (PHQ-2/PHQ-9): PHQ-2 SCORE: 0  , done 11/27/2024   If you checked off any problems, how difficult have these problems  made it for you to do your work, take care of things at home, or get along with other people?: Not difficult at all    Last [facility] Suicide Screening on 11/27/2024 was No Risk.       Cognitive Assessment:   She had a completely normal cognitive assessment - see flowsheet entries         Supplementary Documentation:     In the past six months, have you lost more than 10 pounds without trying?: 2 - No  Has your appetite been poor?: No  Type of Diet: Balanced  How does the patient maintain a good energy level?: Appropriate Exercise  How would you describe your current health state?: Good  How do you maintain positive mental well-being?: Social Interaction;Visiting Family;Visiting Friends  On a scale of 0 to 10, with 0 being no pain and 10 being severe pain, what is your pain level?: 0 - (None)  In the past six months, have you experienced urine leakage?: 0-No  At any time do you feel concerned for the safety/well-being of yourself and/or your children, in your home or elsewhere?: No  Have you had any immunizations at another office such as Influenza, Hepatitis B, Tetanus, or Pneumococcal?: No    Visual Acuity:   Right Eye Visual Acuity: Corrected Right Eye Chart Acuity: 20/20   Left Eye Visual Acuity: Corrected Left Eye Chart Acuity: 20/20   Both Eyes Visual Acuity: Corrected Both Eyes Chart Acuity: 20/20   Able To Tolerate Visual Acuity: Yes        PHYSICAL EXAM:   /63 (BP Location: Right arm, Patient Position: Sitting, Cuff Size: large)   Pulse 67   Resp 18   Ht 5' 3.5\" (1.613 m)   Wt 205 lb 6.4 oz (93.2 kg)   SpO2 100%   BMI 35.81 kg/m²   BP Readings from Last 3 Encounters:   11/27/24 127/63   09/30/24 146/77   06/28/24 128/73     Wt Readings from Last 3 Encounters:   11/27/24 205 lb 6.4 oz (93.2 kg)   09/30/24 212 lb (96.2 kg)   06/28/24 212 lb (96.2 kg)      Body mass index is 35.81 kg/m².   Physical Exam  Vitals and nursing note reviewed.   Constitutional:       General: She is not in acute  distress.     Appearance: Normal appearance. She is well-developed and well-groomed. She is not ill-appearing, toxic-appearing or diaphoretic.      Interventions: She is not intubated.  HENT:      Head: Normocephalic and atraumatic.      Jaw: No trismus, tenderness, swelling, pain on movement or malocclusion.      Comments: No prominence of temporal artery bilaterally.  No TMJ tenderness.  No scalp tenderness.     Right Ear: Tympanic membrane, ear canal and external ear normal. No decreased hearing noted. No laceration, drainage, swelling or tenderness. No middle ear effusion. There is no impacted cerumen. No foreign body. No mastoid tenderness. No PE tube. No hemotympanum. Tympanic membrane is not injected, scarred, perforated, erythematous, retracted or bulging. Tympanic membrane has normal mobility.      Left Ear: Tympanic membrane, ear canal and external ear normal. No decreased hearing noted. No laceration, drainage, swelling or tenderness.  No middle ear effusion. There is no impacted cerumen. No foreign body. No mastoid tenderness. No PE tube. No hemotympanum. Tympanic membrane is not injected, scarred, perforated, erythematous, retracted or bulging. Tympanic membrane has normal mobility.      Nose:      Right Sinus: No maxillary sinus tenderness or frontal sinus tenderness.      Left Sinus: No maxillary sinus tenderness or frontal sinus tenderness.      Mouth/Throat:      Lips: Pink. No lesions.      Mouth: Mucous membranes are moist. No injury, lacerations, oral lesions or angioedema.      Dentition: No dental tenderness, gingival swelling, dental abscesses or gum lesions.      Tongue: No lesions. Tongue does not deviate from midline.      Palate: No mass and lesions.      Pharynx: Oropharynx is clear. Uvula midline. No pharyngeal swelling, oropharyngeal exudate, posterior oropharyngeal erythema or uvula swelling.      Tonsils: No tonsillar exudate or tonsillar abscesses.   Eyes:      General: Lids are  normal. Gaze aligned appropriately. No scleral icterus.        Right eye: No foreign body, discharge or hordeolum.         Left eye: No foreign body, discharge or hordeolum.      Extraocular Movements: Extraocular movements intact.      Right eye: Normal extraocular motion and no nystagmus.      Left eye: Normal extraocular motion and no nystagmus.      Conjunctiva/sclera: Conjunctivae normal.      Right eye: Right conjunctiva is not injected. No chemosis, exudate or hemorrhage.     Left eye: Left conjunctiva is not injected. No chemosis, exudate or hemorrhage.     Pupils: Pupils are equal, round, and reactive to light.   Neck:      Thyroid: No thyroid mass, thyromegaly or thyroid tenderness.      Vascular: Normal carotid pulses. No carotid bruit, hepatojugular reflux or JVD.      Trachea: Trachea and phonation normal. No tracheal tenderness, tracheostomy, abnormal tracheal secretions or tracheal deviation.   Cardiovascular:      Rate and Rhythm: Normal rate and regular rhythm.      Pulses: Normal pulses.           Carotid pulses are 2+ on the right side and 2+ on the left side.       Radial pulses are 2+ on the right side and 2+ on the left side.        Dorsalis pedis pulses are 2+ on the right side and 2+ on the left side.        Posterior tibial pulses are 2+ on the right side and 2+ on the left side.      Heart sounds: Normal heart sounds, S1 normal and S2 normal.   Pulmonary:      Effort: Pulmonary effort is normal. No tachypnea, bradypnea, accessory muscle usage, prolonged expiration, respiratory distress or retractions. She is not intubated.      Breath sounds: Normal breath sounds and air entry. No stridor, decreased air movement or transmitted upper airway sounds. No decreased breath sounds, wheezing, rhonchi or rales.   Chest:      Chest wall: No tenderness.   Breasts:     Breasts are symmetrical.      Right: No swelling, bleeding, inverted nipple, mass, nipple discharge, skin change or tenderness.       Left: No swelling, bleeding, inverted nipple, mass, nipple discharge, skin change or tenderness.   Abdominal:      General: Bowel sounds are normal. There is no distension.      Palpations: Abdomen is soft. Abdomen is not rigid. There is no fluid wave, hepatomegaly, splenomegaly or mass.      Tenderness: There is no abdominal tenderness. There is no right CVA tenderness, left CVA tenderness, guarding or rebound.   Genitourinary:     Exam position: Supine.   Musculoskeletal:      Right shoulder: Normal range of motion.      Left shoulder: Normal range of motion.      Cervical back: Neck supple. Spasms (L paraspinal muscle spasm.), tenderness and crepitus present. No swelling, edema, deformity, erythema, signs of trauma, lacerations, rigidity, torticollis or bony tenderness. No pain with movement. Normal range of motion.      Right lower leg: No edema.      Left lower leg: No edema.      Right ankle: No swelling, deformity, ecchymosis or lacerations. No tenderness. Normal range of motion. Anterior drawer test negative. Normal pulse.      Right Achilles Tendon: No tenderness or defects. Gonzalez's test negative.      Right foot: Normal range of motion and normal capillary refill. Tenderness present. No swelling, deformity, foot drop, laceration or crepitus. Normal pulse.        Feet:    Lymphadenopathy:      Head:      Right side of head: No submental, submandibular, preauricular, posterior auricular or occipital adenopathy.      Left side of head: No submental, submandibular, preauricular, posterior auricular or occipital adenopathy.      Cervical: No cervical adenopathy.      Right cervical: No superficial, deep or posterior cervical adenopathy.     Left cervical: No superficial, deep or posterior cervical adenopathy.      Upper Body:      Right upper body: No supraclavicular, axillary or pectoral adenopathy.      Left upper body: No supraclavicular, axillary or pectoral adenopathy.   Skin:     General: Skin is warm  and dry.      Coloration: Skin is not pale.      Findings: No erythema or rash.   Neurological:      General: No focal deficit present.      Mental Status: She is alert and oriented to person, place, and time.      Cranial Nerves: Cranial nerves 2-12 are intact. No dysarthria or facial asymmetry.      Motor: No tremor or seizure activity.   Psychiatric:         Mood and Affect: Mood normal.         Speech: Speech normal.         Behavior: Behavior normal. Behavior is cooperative.              ASSESSMENT/PLAN:     Encounter Diagnoses   Name Primary?    Gastroesophageal reflux disease without esophagitis Careful with diet. Avoid hot spicy foods and caffeine and caffinated beverages. Careful with acidic foods. Elevate head of bed. Wait 2 hrs. after eating before going to bed to allow digestion. Avoid caffeinated or carbonated beverages, fried foods, spicy foods or highly acidic foods (citrus, onions, tomatoes, etc  -don’t lay down 20-30 minutes after eating or drinking  -use wedge pillow under mattress or use cinder blocks to elevate head of bed-this will prevent reflux at night  -take medications as directed  -quit smoking if applicable  -call if symptoms do not improve within 2-3 days or if symptoms worsen  Follow up with Gastroenterologist     Yes    Breast cancer screening by mammogram Normal mammogram. Continue self breast exam every month.         Cervical radiculopathy Stable.        Elevated liver enzymes Check blood.        Fatty liver disease, nonalcoholic Weight loss.        High cholesterol Check blood.        Other insomnia     Obstructive sleep apnea syndrome Uses fro 10 PM til 4 Am. Refreshing sleep.        Polyarthralgia Stable.        Prediabetes Check blood.        Shoulder arthritis Stable.        Spondylosis of lumbar region without myelopathy or radiculopathy Stable.        Vitamin D deficiency, unspecified Check blood.        Well woman exam with routine gynecological exam Check urine.         Encounter for annual health examination Check blood.        Pain of right heel Improved. freeze water bottle and roll foot on it for 15 min 3-4 times per day     -do not wear flip flips or flat type shoes     -wear good supportive shoes with arch support such as gym shoes     -get plantar faciitis arch support insoles for shoes     -stretch achilles before getting out of bed and throughout the day     -this problem can take a long time to heal     -call if symptoms are worsening     -[employer] brand shoes        Treating Plantar Fasciitis  First, your healthcare provider tries to find out the cause of your problem. He or she can then suggest ways to ease pain. If your pain is due to poor foot mechanics, occasionally custom-made shoe inserts (orthoses) may help.    Ease symptoms  To relieve mild symptoms, try aspirin, ibuprofen, or other medicines as directed. Rubbing ice on the area may also help.  To lessen severe pain and swelling, your healthcare provider may give you pills or injections. In some cases, you may need a walking cast. Physical therapy, such as ultrasound or a daily stretching program, may also be recommended. Surgery is rarely needed.  To ease symptoms caused by poor foot mechanics, your foot may be taped. This supports the arch and temporarily controls movement. Night splints may also help by stretching the fascia.    Control movement  If taping helps, your healthcare provider may prescribe orthoses. These are inserts built from plaster casts of your feet. They control the way your foot moves. As a result, your symptoms may go away.  Reduce overuse  Every time your foot strikes the ground, the plantar fascia is stretched. You can lessen the strain on the plantar fascia and the chance of overuse by:  Losing any excess weight  Not running on hard or uneven ground  Using orthoses, if recommended, in your shoes and house slippers  If surgery is needed  Your healthcare provider may consider surgery if other  types of treatment don't control your pain. During surgery, the plantar fascia is partially cut to release tension. As you heal, fibrous tissue fills the space between the heel bone and the plantar fascia.  [employer] last reviewed this educational content on 1/1/2018 © 2000-2020 [employer]. [address] [place] [id number]. All rights reserved. This information is not intended as a substitute for professional medical care. Always follow your healthcare professional's instructions.        Understanding Plantar Fasciitis    Plantar fasciitis is a condition that causes foot and heel pain. The plantar fascia is a tough band of tissue that runs across the bottom of the foot from the heel to the toes. This tissue pulls on the heel bone. It supports the arch of the foot as it pushes off the ground. If the tissue becomes irritated or red and swollen (inflamed), it is called plantar fasciitis.    How to say it  PLAN-tar [id number]   What causes plantar fasciitis?  Plantar fasciitis most often occurs from overusing the plantar fascia. The tissue may become damaged from activities that put repeated stress on the heel and foot. Or it may wear down over time with age and ankle stiffness. You are more likely to have plantar fasciitis if you:   Do activities that require a lot of running, jumping, or dancing  Have new or increase activity  Have a job that requires being on your feet for long periods  Are overweight or obese  Have certain foot problems, such as a tight Achilles tendon, flat feet, or high arches  Often wear poorly fitting shoes  Symptoms of plantar fasciitis  The condition most often causes pain in the heel and the bottom of the foot. The pain may occur when you take your first steps in the morning. It may get better as you walk throughout the day. But as you continue to put weight on the foot, the pain often returns. Pain may also occur after standing or sitting for long periods.    Treating plantar fasciitis  Treatments for plantar fasciitis include:  Resting the foot. This involves limiting movements that make your foot hurt. You may also need to avoid certain sports and types of work for a time.  Using cold packs. Put an ice pack (wrapped in a thin towel) on the heel and foot to help reduce pain and swelling.  Taking medicines. Prescription and over-the-counter medicines can help relieve pain and swelling. NSAIDs (nonsteroidal anti-inflammatory drugs) are the most common medicines used. They may be given as pills. Or they may be put on the skin as a gel, cream, or patch.  Using heel cups or foot inserts (orthotics). These are placed in the shoes to help support the heel or arch and cushion the heel. You may also be advised to buy proper-fitting shoes with good arch support and cushioned soles.  Taping the foot. This supports the arch and limits the movement of the plantar fascia to help ease symptoms.  Wearing a night splint. This stretches the plantar fascia and leg muscles while you sleep. This may help ease pain.  Doing exercises and physical therapy. These stretch and strengthen the plantar fascia and the muscles in the leg that support the heel and foot. Stretching your calf and plantar fascia is the most effective way to relieve pain.  Getting shots of medicine into the foot. These may help ease symptoms for a time. The shots often contain corticosteroids. These are strong anti-inflammatory medicines.  Having surgery. This may be needed if other treatments fail to relieve symptoms. During surgery, the surgeon may partially cut the plantar fascia to release tension.  Possible complications of plantar fasciitis  Without proper care and treatment, healing may take longer than normal. Also, symptoms may continue or get worse. Over time, the plantar fascia may be damaged. This can make it hard to walk or even stand without pain.   When to call your healthcare provider  Call your  healthcare provider right away if you have any of these:  Fever of 100.4°F (38°C) or higher, or as directed by your provider  Chills  Symptoms that don’t get better with treatment, or get worse  New symptoms, such as numbness, tingling, or weakness in the foot  StayWell last reviewed this educational content on 6/1/2019 © 2000-2020 [employer]. [address] [place] [address]. All rights reserved. This information is not intended as a substitute for professional medical care. Always follow your healthcare professional's instructions.        Ankle Dorsiflexion/Plantarflexion (Flexibility)    Sit on the floor or in bed with your legs straight out in front of you.  Point both feet. Then flex both feet.  Do this 10 to 30 times in a row.  Repeat this exercise 2 times a day, or as instructed.  StayWell last reviewed this educational content on 6/1/2019 © 2000-2020 [employer]. [address] [place] [address]. All rights reserved. This information is not intended as a substitute for professional medical care. Always follow your healthcare professional's instructions.        Toe Extension (Flexibility)    These instructions are for your right foot. Switch sides for your left foot.  Sit in a chair. Rest your right ankle on your left knee.  Hold your toes with your right hand. Gently bend the toes backward. Feel a stretch in the undersides of the toes and ball of the foot. Hold for 30 to 60 seconds.  Then gently bend the toes in the other direction. Gently press on them until your foot is pointed. Hold for 30 to 60 seconds.  Repeat 5 times, or as instructed.  StayWell last reviewed this educational content on 5/1/2016 © 2000-2020 [employer]. [address] [place] [address]. All rights reserved. This information is not intended as a substitute for professional medical care. Always follow your healthcare professional's instructions.        Other  headache syndrome Check blood. Neck excvercises.  Check blood pressure when have these headaches.  Compliant with CPAP use.            Orders Placed This Encounter   Procedures    Lipid Panel    Comp Metabolic Panel (14)    Vitamin D    Hemoglobin A1C    URINALYSIS, AUTO, W/O SCOPE    Sed Jose Florian (Automated)    Advance Care Planning- 1st 30 minutes       Meds This Visit:  Requested Prescriptions     Signed Prescriptions Disp Refills    Amitriptyline HCl 100 MG Oral Tab 90 tablet 3     Sig: Take 1 tablet (100 mg total) by mouth nightly.       Imaging & Referrals:  None      RTC 6 months for FU HTN.     1. Gastroesophageal reflux disease without esophagitis (Primary)  2. Breast cancer screening by mammogram  3. Cervical radiculopathy  Overview:  Cervical radiculopathy right upper extremity with mild osteoarthritis right glenohumeral joint     Plan: Reviewed the x-rays with the patient however her symptoms are more cervical related.  Recommend nonoperative care with a Medrol Dosepak followed by Mobic and formal physical therapy.  Patient agreed recommend follow-up in 4 weeks for reevaluation consider imaging of her cervical spine if still symptomatic.  All of her questions were asked and answered.  Language Line was used for translation today.     4. Elevated liver enzymes  -     Comp Metabolic Panel (14); Future; Expected date: 11/29/2024  5. Fatty liver disease, nonalcoholic  -     Comp Metabolic Panel (14); Future; Expected date: 11/29/2024  6. High cholesterol  -     Lipid Panel; Future; Expected date: 11/29/2024  -     Comp Metabolic Panel (14); Future; Expected date: 11/29/2024  7. Other insomnia  8. Obstructive sleep apnea syndrome  9. Polyarthralgia  10. Prediabetes  -     Lipid Panel; Future; Expected date: 11/29/2024  -     Comp Metabolic Panel (14); Future; Expected date: 11/29/2024  -     Hemoglobin A1C; Future; Expected date: 11/29/2024  -     URINALYSIS, AUTO, W/O SCOPE  11. Shoulder  arthritis  Overview:  10-7-20: Left shoulder glenohumeral osteoarthritis, mild     Plan: I discussed operative and nonoperative treatment options.  She elected for a Kenalog injection today.  We injected the glenohumeral joint with Kenalog.  I advised activity modifications and oral anti-inflammatory use.  Follow-up again as needed.    Cervical radiculopathy right upper extremity with mild osteoarthritis right glenohumeral joint     Plan: Reviewed the x-rays with the patient however her symptoms are more cervical related.  Recommend nonoperative care with a Medrol Dosepak followed by Mobic and formal physical therapy.  Patient agreed recommend follow-up in 4 weeks for reevaluation consider imaging of her cervical spine if still symptomatic.  All of her questions were asked and answered.  Language Line was used for translation today.     12. Spondylosis of lumbar region without myelopathy or radiculopathy  13. Vitamin D deficiency, unspecified   -     Vitamin D; Future; Expected date: 11/27/2024  14. Well woman exam with routine gynecological exam  15. Encounter for annual health examination  -     Advance Care Planning- 1st 30 minutes  16. Pain of right heel  17. Other headache syndrome  -     Sed Jose Florian (Automated); Future; Expected date: 11/27/2024  Other orders  -     Amitriptyline HCl; Take 1 tablet (100 mg total) by mouth nightly.  Dispense: 90 tablet; Refill: 3      The patient indicates understanding of these issues and agrees to the plan.  Further testing ordered.  Imaging studies ordered.  Lab work ordered.  Reinforced healthy diet, lifestyle, and exercise.      No follow-ups on file.    Advanced Directives:   She does NOT have a Living Will. [Do you have a living will?: No]  She does NOT have a Power of  for Health Care. [Do you have a healthcare power of ?: No]  Discussed Advance Care Planning with patient (and family/surrogate if present). Standard forms made available to  patient in After Visit Summary.  16+ minutes was spent with all Advanced Care Planning elements today (up to 30 minutes for CPT 92306)    MD Citlalli Baig's SCREENING SCHEDULE   Tests on this list are recommended by your physician but may not be covered, or covered at this frequency, by your insurer.   Please check with your insurance carrier before scheduling to verify coverage.   PREVENTATIVE SERVICES FREQUENCY &  COVERAGE DETAILS LAST COMPLETION DATE   Diabetes Screening    Fasting Blood Sugar /  Glucose    One screening every 12 months if never tested or if previously tested but not diagnosed with pre-diabetes   One screening every 6 months if diagnosed with pre-diabetes Lab Results   Component Value Date    GLU 94 05/10/2024        Cardiovascular Disease Screening    Lipid Panel  Cholesterol  Lipoprotein (HDL)  Triglycerides Covered every 5 years for all Medicare beneficiaries without apparent signs or symptoms of cardiovascular disease Lab Results   Component Value Date    CHOLEST 187 05/09/2024    HDL 60 (H) 05/09/2024    LDL 92 05/09/2024    TRIG 208 (H) 05/09/2024         Electrocardiogram (EKG)   Covered if needed at Welcome to Medicare, and non-screening if indicated for medical reasons 05/09/2024      Ultrasound Screening for Abdominal Aortic Aneurysm (AAA) Covered once in a lifetime for one of the following risk factors    Men who are 65-75 years old and have ever smoked    Anyone with a family history -     Colorectal Cancer Screening  Covered for ages 50-85; only need ONE of the following:    Colonoscopy   Covered every 10 years    Covered every 2 years if patient is at high risk or previous colonoscopy was abnormal 10/18/2018    Health Maintenance   Topic Date Due    Colorectal Cancer Screening  10/18/2028       Flexible Sigmoidoscopy   Covered every 4 years -    Fecal Occult Blood Test Covered annually -   Bone Density Screening    Bone density screening    Covered every 2  years after age 65 if diagnosed with risk of osteoporosis or estrogen deficiency.    Covered yearly for long-term glucocorticoid medication use (Steroids) Last Dexa Scan:    XR DEXA BONE DENSITOMETRY (CPT=77080) 11/17/2021      No recommendations at this time   Pap and Pelvic    Pap   Covered every 2 years for women at normal risk; Annually if at high risk 12/26/2019  No recommendations at this time    Chlamydia Annually if high risk -  No recommendations at this time   Screening Mammogram    Mammogram     Recommend annually for all female patients aged 40 and older    One baseline mammogram covered for patients aged 35-39 06/18/2024    Health Maintenance   Topic Date Due    Mammogram  06/18/2025       Immunizations    Influenza Covered once per flu season  Please get every year 10/31/2024  No recommendations at this time    Pneumococcal Each vaccine (Hivalds31 & [id number]) covered once after 65 Prevnar 13: 10/13/2017    Fharjlhzn58: 02/16/2018     No recommendations at this time    Hepatitis B One screening covered for patients with certain risk factors   -  No recommendations at this time    Tetanus Toxoid Not covered by Medicare Part B unless medically necessary (cut with metal); may be covered with your pharmacy prescription benefits -    Tetanus, Diptheria and Pertusis TD and TDaP Not covered by Medicare Part B -  No recommendations at this time    Zoster Not covered by Medicare Part B; may be covered with your pharmacy  prescription benefits -  No recommendations at this time                Understanding Advance Care Planning  Advance care planning is the process of deciding one’s own future medical care. It helps assure that if you can’t speak for yourself, your wishes can still be carried out. The plan is a series of legal documents that note a person’s wishes. The documents vary by state. Advance care planning should be discussed at a regular office visit with your primary care provider before an acute  illness. Advance care planning is encouraged when a person has a serious illness that is expected to get worse. It may also be done before major surgery. And it can help you and your family be prepared in case of a major illness or injury. Advance care planning helps with making decisions at these times.     Who will speak on your behalf?  A healthcare proxy is a person who acts as the voice of a patient when the patient can’t speak for himself or herself. The name of this role varies by state. It may be called a Durable Medical Power of  or Durable Power of  for Healthcare. It may be called an agent, surrogate, or advocate. Or it may be called a representative or decision maker. It's an official duty that is identified by a legal document. The document also varies by state.   Why is advance care planning important?   If a person communicates his or her healthcare wishes:   He or she will be given medical care that matches his or her values and goals.  Family members will not be forced to make decisions in a crisis with no guidance.  Creating a plan  Making an advance care plan is often done in 3 steps:   Thinking about one’s wishes. To create an advance care plan, think about what kind of medical treatment you would want if you lose the ability to communicate. Are there any situations in which you would refuse or stop treatment? Are there therapies you would want or not want? And whom do you want to make decisions for you? There are many places to learn more about how to plan for your care. Ask your healthcare provider or  for resources.  Picking a healthcare proxy. This means choosing a trusted person to speak for you only when you can’t speak for yourself. When you can't make medical decisions, your proxy makes sure the instructions in your advance care plan are followed. A proxy doesn't make decisions based on his or her own opinions. They must put aside those opinions and values if  needed, and carry out your wishes.  Filling out the legal documents. There are several kinds of legal documents for advance care planning. Each one tells healthcare providers your wishes. The documents may vary by state. They must be signed and may need to be witnessed or notarized. You can cancel or change them whenever you wish. Depending on your state, the documents may include a Healthcare Proxy form, Living Will, Durable Medical Power of , and Advance Directive.  The family’s role  The best help a family can give is to support their loved one’s wishes. Open and honest communication is vital. Family should express any concerns they have about the patient’s choices while the patient can still make decisions in the event that his or her illness prevents communicating those wishes at a later time.    StayWell last reviewed this educational content on 12/1/2019    © 4841-0096 [employer]. All rights reserved. This information is not intended as a substitute for professional medical care. Always follow your healthcare professional's instructions.          Advance Medical Directive  An advance medical directive is a form that lets you plan ahead for the care you’d want if you could no longer express your wishes. This statement outlines the medical treatment you’d want or names the person you’d wish to make healthcare decisions for you. Be aware that laws vary from state to state, and it may be worthwhile to talk with an .     Writing down your wishes  An advance directive is important whether you’re young or old. Injury or illness can strike at any age.  Decide what is important to you and the kind of treatment you’d want, or not want to have.  Some states allow only one kind of advance directive. Some let you do both a durable power of  for healthcare and a living will. Some states put both kinds on the same form.    A durable power of  (POA) for healthcare   This form  lets you name someone else that you have chosen and trust to speak and make decisions on your behalf.  This person can decide on treatment for you only when you can’t speak for yourself.  You don't need to be at the end of your life. They could speak for you if you were in a coma but were likely to recover.    A living will  This form lets you list the care you want at the end of your life.  A living will applies only if you won’t live without medical treatment. It would apply if you had advanced cancer, a massive stroke, or other serious illness from which you will not recover.  It takes effect only when you can no longer express your wishes yourself.    [employer] last reviewed this educational content on 2/1/2021 © 2000-2021 [employer]. All rights reserved. This information is not intended as a substitute for professional medical care. Always follow your healthcare professional's instructions.          Choosing an Agent  A durable power of  for healthcare is only as good as the person you name to be your agent. Your agent is the person you have chosen to speak and make decisions on your behalf. If this person knows your treatment wishes and is willing to carry them out, you’ll probably be well represented. Be sure to tell your agent what’s important to you.     Who to choose  Here are suggestions for choosing an agent:  You can name a family member, close friend, , , or rabbi.  You should name one person as your agent. Then name one or two alternates. You need a backup person in case your first choice can’t be reached when needed.  Talk with each person you're thinking of naming as your agent or alternate. Do this before you decide who should carry out your wishes.  Your agent should be ...  A competent adult, age 18 or older  Someone you trust and can talk to about the care you want and what's important to you  Someone who supports your treatment choices    In many states,  your agent can’t be ...   Your healthcare provider  An employee of your provider or of a hospital, nursing home, or hospice program where you receive care  Some states have other restrictions on who can be named as an agent for an advance directive.   Be prepared  Tip: It's a good idea to write down your wishes and give a copy to your agent and all others who are involved with your healthcare.   [employer]Well last reviewed this educational content on 8/1/2021 © 2000-2021 [employer]. All rights reserved. This information is not intended as a substitute for professional medical care. Always follow your healthcare professional's instructions.          Understanding DNR Orders  Do not resuscitate (DNR) orders tell hospital staff not to do potentially life-restoring measures, such as CPR, if you or your loved one's heart and lungs stop working. It allows a natural death, and prevents healthcare staff from artificially reviving a person and placing them on life support. In many states, a DNR order also applies to staff outside the hospital such as in nursing homes and emergency medical services. A DNR order must be written by a healthcare provider. Or in some cases, certain other healthcare workers write it. This can only be done with the person’s or family’s consent. If a person has not written an advance directive, their family will decide on a DNR with the help of the healthcare team.   The person can cancel a DNR order at any time. The healthcare team can answer questions about the DNR form. Have copies of a DNR form are readily available so that your wishes can be faithfully followed.   Writing a DNR order  When might a DNR order be written? When the person’s health condition is such that, in the case of cardiac arrest, CPR and other resuscitation methods are not desired. This could be because the chance of successful resuscitation is very low. Or it could be because the care plan now focuses on comfort  measures instead of life-sustaining measures. Coma and terminal illness are instances when a DNR order might be used.   Irreversible coma  In a coma, a person does not respond to sight, sound, or touch. The heart and lungs could be working, but brain function is damaged due to trauma or disease.   Terminal illness  In the last stages of heart disease, AIDS, cancer, and other illnesses, some people don’t want to prolong their suffering. If recovery isn’t likely and quality of life is poor or getting worse, a person or their family may agree to a DNR order.   DNR orders and hospice care  A hospice program can offer care during the final weeks of life. Hospice programs provide dignity, pain control and comfort care in the home or at special facilities. Hospice does not provide aggressive treatment. In fact, a DNR order will likely be discussed before a person is admitted to hospice. A  or  may be able to help you arrange for hospice support.   Documenting end-of-life wishes  In addition to DNR orders, a person with a serious, life-limiting illness may wish to document their treatment wishes. This is called a POST form or by different names, depending on the state. It's meant to provide a portable medical order to help guide healthcare providers on the specific medical treatments a person wants during a medical emergency. It's meant to complement a DNR order, not replace it. Your healthcare provider can tell you more and help you complete the forms. The form may be called one of these:   MOLST (medical orders for life-sustaining treatment)  POLST (physician orders for life-sustaining treatment)  MOST (medical orders for scope of treatment)  POST (physician orders for scope of treatment)  TPOPP (transportable physician orders for patient preferences)  [employer] last reviewed this educational content on 7/1/2021 © 2000-2021 [employer]. All rights reserved. This information is not  intended as a substitute for professional medical care. Always follow your healthcare professional's instructions.          An Agent’s Role for Durable Power of  for Health Care   It’s impossible to know which medical treatment choices you might face in the future. What if you aren't able to make these decisions for yourself? A durable power of  for healthcare lets you name an agent to speak and carry out your wishes on your behalf. This happens only if you can’t express your wishes yourself.     An agent’s duty  Your agent respects your wishes in the following ways:    Your agent’s duty is to see that your wishes are followed.  If your wishes aren’t known, your agent should try to decide what you want.  Your agent’s choices come before anyone else’s wishes for you.  A durable power of  for healthcare doesn't not give your agent control over your money . Your agent also can’t be made to pay your bills.  Find out what your agent can do  Restrictions on what an agent can and can’t do vary by state. Check your state laws. In most states your agent can:   Choose or refuse life-sustaining and other medical treatment on your behalf  Consent to treatment, and then stop treatment if your condition doesn’t improve  Access and release your medical records  Request an autopsy and donate your organs, unless you’ve stated otherwise in your advance directive  Find out whether your state allows your agent to do the following:   Refuse or withdraw life-enhancing care  Refuse or stop tube feeding or other life-sustaining care--even if you haven’t stated on your advance directive that you don’t want these treatments  Order sterilization or   Jesika last reviewed this educational content on 2021 [employer]. All rights reserved. This information is not intended as a substitute for professional medical care. Always follow your healthcare professional's  instructions.          Being a Healthcare Proxy  A healthcare proxy is someone who represents a person who can’t speak for themself. The name of this role varies by state. It may be called a Durable Medical Power of . It may be called a Durable Power of  for Healthcare. It may be called an agent, surrogate, or advocate. Or it may be called a representative or decision maker. It's an official duty that is noted by a legal document. The document also varies by state. The person must name you as his or her proxy on the document.      A healthcare proxy speaks for another person when he or she is not able to do so. The proxy helps make sure the person’s healthcare wishes are known and followed.     What it means to be a healthcare proxy   Your role as healthcare proxy starts when the person can’t make medical decisions. This assessment can only be made by a licensed doctor. You then make the healthcare decisions as needed. You do this by carrying out the person’s wishes. These wishes are noted in his or her advance care planning documents. These declare what kind of treatment the person wishes to have or not have. You may need to put aside your own values and opinions to carry out the person’s wishes. This may include refusing or stopping life-sustaining treatments.   Documenting end-of-life wishes   As a healthcare proxy, encourage the person to discuss his or her wishes, while they are able. They can do this with their healthcare provider and then document the wishes as a medical order. The provider can help the person complete the form. The forms are known by different names depending on the state. The form may be called one of these:     MOLST (medical orders for life-sustaining treatment)  POLST (physician orders for life-sustaining treatment)  MOST (medical orders for scope of treatment)  POST (physician orders for scope of treatment)  TPOPP (transportable physician orders for patient  preferences)    The form documents the person’s wishes at the end of life. It's not tied to a certain healthcare provider or facility. It's different than a living will. The form is an order written according to state regulations by a healthcare provider. To complete one, the person must express his or her wishes to an advanced healthcare provider. If the person can’t make his or her own decisions, then this is done by the person’s healthcare proxy.   Carrying out your role  Your duties depend on what the person’s advance care planning documents say. Your duties may also depend on state law. In general:   Before accepting a role as a proxy, talk with the person. Be sure you know his or her wishes. Ask questions. This will help you be his or her voice if and when it is needed.  Be sure that the person’s healthcare team knows that you are his or her proxy. Carry a copy of the document and proof of your identity.  Make sure the healthcare team has a copy of the advance care planning documents.  Talk to the healthcare team. Ask questions as often as you need. Stay informed about the person’s condition.  Ask for any help you need to understand the medical situation. Ask about the person’s condition and prognosis. Ask about risks and benefits of tests and treatments. Find out all the facts and options.  Speak on the person’s behalf with the healthcare team when needed.  Talk with family members and keep them informed.  Know your rights. You have the right to ask for information. You can ask for consultations and second opinions. You have the right to request or refuse treatment for the person. You may be able to review his or her medical chart. You can authorize the person’s transfer to another facility. You can also request a new healthcare provider for him or her. If you are not sure what your rights are at any time, ask a .  When it’s time to make decisions  If the person’s wishes are clear in the advance  care plan documents, ask for them to be carried out as noted. If they are not clear, talk with the healthcare team. Listen to the team’s recommendations. Talk with a  or counselor. It may be hard for you to make a decision at times. You may feel sad or upset about a decision. Being a healthcare proxy is not an easy role. But it's an important one. Remember that the person trusts you to carry out his or her wishes.   If you need help  Ask the healthcare team if you have trouble with a decision. The healthcare team will help you.  Encourage the person you are helping to have a conversation with their provider about their end-of-life wishes. The provider can help them fill out the form.  You may need help in resolving family conflicts. Ask the hospital or clinic , ethics consultant, or a  for help.  If you are having trouble talking with the healthcare team while the person is in the hospital, reach out to the patient relations department. Or ask to speak to the hospital ombudsman or ethics committee.    [employer] last reviewed this educational content on 9/1/2019    © 9676-9890 [employer]. All rights reserved. This information is not intended as a substitute for professional medical care. Always follow your healthcare professional's instructions.          Life Support  If you understand how specific treatments may affect your quality of life, you can decide which ones you’d choose or refuse. You may want to talk to your healthcare provider about the possible benefits and risks of treatments. The chance of good results from these therapies varies based on each individual clinical situation and can be very hard to predict. Medical treatment, if your life is in danger, falls into 3 main categories.     Life supporting  This care keeps your heart and lungs going when they can no longer work on their own.  CPR restarts your heart and lungs if they stop  working.  A respirator (or ventilator) keeps you breathing. Air is pumped into your lungs through a tube that’s put into your windpipe.    Life sustaining  This care keeps you alive longer when you have an illness that can’t be cured.  Tube feeding or TPN (total parenteral nutrition) provides food and fluids through a tube or IV (intravenous). It is given if you can’t chew or swallow on your own.  Dialysis is a kidney machine that cleans your blood when your kidneys can no longer work on their own.    Life enhancing  This care controls pain and discomfort, such as nausea or trouble breathing. This type of care is not designed to prolong your life, but to enhance comfort and quality of life. Nothing is done to keep you alive longer.  Hospice care is comfort care. It might provide food and fluids by mouth or help with bathing. Hospice care is given during the last stages of a terminal illness.  Strong pain medicine can be given to help keep you comfortable.    Do Not Resuscitate (DNR)  Would you want CPR if your heart stops while you’re a patient in a hospital or nursing home? If not, talk to your healthcare provider about issuing a DNR (Do-Not-Resuscitate) order.   DNRs and advance directives may not apply during anesthesia, in emergency rooms, or when emergency medical teams respond to a 911 call. Ask your healthcare provider how you can make sure your wishes will be followed. Also, a DNR will not prevent you from getting other kinds of needed medical care such as treatment for pain, or bleeding.   [employer] last reviewed this educational content on 8/1/2021 © 2000-2021 [employer]. All rights reserved. This information is not intended as a substitute for professional medical care. Always follow your healthcare professional's instructions.          Stopping Life-Sustaining Treatments  Certain treatments can help sustain life when you have a serious illness. But as your illness progresses, there may  come a time when these treatments are no longer a benefit. Decisions must then be made whether to continue or stop these treatments. This can be a hard task for you and your loved ones, but know that you’re not alone. Your healthcare provider and healthcare team will guide you through these treatment decisions. They will also help answer any questions you may have.     What are life-sustaining treatments?  Life-sustaining treatments help keep you alive if a vital body function fails. These treatments can include CPR and the use of machines to help with heart, lung, or kidney function. They can also include the use of tubes to deliver food, fluids, blood, and medicines to the body. Blood transfusions and antibiotics are also types of life-sustaining treatments.   What happens if life-sustaining treatments are continued?  These treatments can help extend your life. But they will not cure your illness. If you are near the end of your life, you may find it hard to handle the side effects and problems that can occur with these treatments. In this case, the treatments may be too much of a burden on your body. They may cause more harm than good. They may also disrupt the natural dying process and prolong suffering.   What happens if life-sustaining treatments are stopped?  If these treatments are stopped, the focus of treatment will shift to comfort care. This involves measures to control pain and other symptoms you may have. These measures are not meant to cure your illness or help you live longer. Instead, they are meant to improve your quality of life during the time you have left. If you are in the end stage of your illness, you may be referred to hospice by your healthcare provider. Hospice provides end-of-life care. This includes emotional, spiritual, and social support for you and your loved ones.   How do I decide whether to stop life-sustaining treatments?  Your healthcare provider and healthcare team will talk  with you about the specific treatments that are part of your care plan. If you want, you may include family and friends in these meetings. Here are some questions to think about or ask your healthcare team:   Is there is a chance that my illness will improve? Or will it continue to get worse?  What are my goals of care? Do I want to extend the time I have left? Or do I want to focus my care on comfort and managing symptoms?  How will stopping or continuing these treatments affect my health? Will they enhance my comfort and quality of life? Or will they cause more problems?  Consider your own values or florecita. Also ask for advice from those who share your values.  How do I state my decisions about life-sustaining treatments?  Once you’ve made your decision to continue or stop specific treatments, you can tell your healthcare provider directly. It's best to also put your treatment wishes in writing with advance directives. These are legal forms related to healthcare decisions. Laws about advance directives vary from state to state. Ask your healthcare provider about what forms are needed to make sure your wishes will be followed. Some common forms include:   A durable power of  for healthcare or a healthcare proxy form.  This form lets you name a person to make treatment decisions for you when you can’t. This person is often called a healthcare proxy, medical or healthcare power of , or agent.  A living will.  This form tells others the kinds of treatment you want or don’t want if you become too ill or injured to speak for yourself.  Orders for life-sustaining treatments.  These are actual healthcare provider's orders that must be followed by other medical providers. The form belongs to you, not to the healthcare provider or hospital.). These are legal forms obtained from your healthcare provider or hospital that document your wishes. The forms are known by different names depending on the state. Common  names include:  MOLST (medical orders for life-sustaining treatment)  POLST (physician orders for life-sustaining treatment)  MOST (medical orders for scope of treatment)  POST (physician orders for scope of treatment)  TPOPP (transportable physician orders for patient preferences)     Keep in mind that you can change or cancel an advance directive at any time. Make it a practice to review your decisions each time there is a change in your health or goals of care. Also be sure to tell your healthcare proxy and loved ones of any changes in your decisions.   Deciding whether to stop life-sustaining treatments for a loved one   The decision to stop treatment ideally is made with the person’s consent. If the person is not capable of making decisions and has no advance directive, the decision falls to the person’s healthcare proxy or other adult. If you need to make a decision about stopping treatment for a loved one, start by talking to his or her healthcare provider. Review the goals of care and the benefits and burdens of specific treatments on your loved one’s health. Also think about your loved one’s wishes and values. If needed, seek advice from other healthcare team members, like a  or .   [employer] last reviewed this educational content on 12/1/2019    © 3407-6734 [employer]. All rights reserved. This information is not intended as a substitute for professional medical care. Always follow your healthcare professional's instructions.  Advance Care Planning done today:   She does NOT have a Living Will. [Do you have a living will?: No]  She does NOT have a Power of  for Health Care. [Do you have a healthcare power of ?: No]     Discussed Advance Care Planning with patient and patient declined resource information.  16+ minutes was spent with all Advanced Care Planning elements today (up to 30 minutes for CPT 00255)  Advance care planning including the  explanation and discussion of advance directives standard forms performed Face to Face with patient and Family/surrogate (if present), and forms available to patient in AVS         [1]   Allergies  Allergen Reactions    Corn OTHER (SEE COMMENTS)     Allergy Test Screening    Dust Mite Mixed Allergen Ext [Mite (D. Farinae)] OTHER (SEE COMMENTS)     Allergy Test Screening    Fish OTHER (SEE COMMENTS)     Allergy Test Screening    Fish-Derived Products OTHER (SEE COMMENTS)     Allergy Test Screening    Other OTHER (SEE COMMENTS)     Allergy Test Screening - Cockroach    Shrimp OTHER (SEE COMMENTS)     Allergy Test Screening

## 2024-12-02 ENCOUNTER — LAB ENCOUNTER (OUTPATIENT)
Dept: LAB | Facility: HOSPITAL | Age: 74
End: 2024-12-02
Attending: INTERNAL MEDICINE
Payer: MEDICARE

## 2024-12-02 DIAGNOSIS — G44.89 OTHER HEADACHE SYNDROME: ICD-10-CM

## 2024-12-02 DIAGNOSIS — E78.00 HIGH CHOLESTEROL: ICD-10-CM

## 2024-12-02 DIAGNOSIS — K76.0 FATTY LIVER DISEASE, NONALCOHOLIC: ICD-10-CM

## 2024-12-02 DIAGNOSIS — R73.03 PREDIABETES: ICD-10-CM

## 2024-12-02 DIAGNOSIS — R74.8 ELEVATED LIVER ENZYMES: ICD-10-CM

## 2024-12-02 DIAGNOSIS — E55.9 VITAMIN D DEFICIENCY, UNSPECIFIED: ICD-10-CM

## 2024-12-02 LAB
ALBUMIN SERPL-MCNC: 4.6 G/DL (ref 3.2–4.8)
ALBUMIN/GLOB SERPL: 1.7 {RATIO} (ref 1–2)
ALP LIVER SERPL-CCNC: 100 U/L
ALT SERPL-CCNC: 37 U/L
ANION GAP SERPL CALC-SCNC: 6 MMOL/L (ref 0–18)
AST SERPL-CCNC: 36 U/L (ref ?–34)
BILIRUB SERPL-MCNC: 0.5 MG/DL (ref 0.2–1.1)
BUN BLD-MCNC: 11 MG/DL (ref 9–23)
BUN/CREAT SERPL: 16.2 (ref 10–20)
CALCIUM BLD-MCNC: 9.9 MG/DL (ref 8.7–10.4)
CHLORIDE SERPL-SCNC: 106 MMOL/L (ref 98–112)
CHOLEST SERPL-MCNC: 197 MG/DL (ref ?–200)
CO2 SERPL-SCNC: 30 MMOL/L (ref 21–32)
CREAT BLD-MCNC: 0.68 MG/DL
EGFRCR SERPLBLD CKD-EPI 2021: 92 ML/MIN/1.73M2 (ref 60–?)
ERYTHROCYTE [SEDIMENTATION RATE] IN BLOOD: 19 MM/HR
EST. AVERAGE GLUCOSE BLD GHB EST-MCNC: 123 MG/DL (ref 68–126)
FASTING PATIENT LIPID ANSWER: YES
FASTING STATUS PATIENT QL REPORTED: YES
GLOBULIN PLAS-MCNC: 2.7 G/DL (ref 2–3.5)
GLUCOSE BLD-MCNC: 101 MG/DL (ref 70–99)
HBA1C MFR BLD: 5.9 % (ref ?–5.7)
HDLC SERPL-MCNC: 55 MG/DL (ref 40–59)
LDLC SERPL CALC-MCNC: 112 MG/DL (ref ?–100)
NONHDLC SERPL-MCNC: 142 MG/DL (ref ?–130)
OSMOLALITY SERPL CALC.SUM OF ELEC: 294 MOSM/KG (ref 275–295)
POTASSIUM SERPL-SCNC: 4.3 MMOL/L (ref 3.5–5.1)
PROT SERPL-MCNC: 7.3 G/DL (ref 5.7–8.2)
SODIUM SERPL-SCNC: 142 MMOL/L (ref 136–145)
TRIGL SERPL-MCNC: 175 MG/DL (ref 30–149)
VIT D+METAB SERPL-MCNC: 49.5 NG/ML (ref 30–100)
VLDLC SERPL CALC-MCNC: 30 MG/DL (ref 0–30)

## 2024-12-02 PROCEDURE — 83036 HEMOGLOBIN GLYCOSYLATED A1C: CPT

## 2024-12-02 PROCEDURE — 82728 ASSAY OF FERRITIN: CPT | Performed by: INTERNAL MEDICINE

## 2024-12-02 PROCEDURE — 83540 ASSAY OF IRON: CPT | Performed by: INTERNAL MEDICINE

## 2024-12-02 PROCEDURE — 86706 HEP B SURFACE ANTIBODY: CPT | Performed by: INTERNAL MEDICINE

## 2024-12-02 PROCEDURE — 80061 LIPID PANEL: CPT

## 2024-12-02 PROCEDURE — 80503 PATH CLIN CONSLTJ SF 5-20: CPT | Performed by: INTERNAL MEDICINE

## 2024-12-02 PROCEDURE — 36415 COLL VENOUS BLD VENIPUNCTURE: CPT

## 2024-12-02 PROCEDURE — 84466 ASSAY OF TRANSFERRIN: CPT | Performed by: INTERNAL MEDICINE

## 2024-12-02 PROCEDURE — 86803 HEPATITIS C AB TEST: CPT | Performed by: INTERNAL MEDICINE

## 2024-12-02 PROCEDURE — 85652 RBC SED RATE AUTOMATED: CPT

## 2024-12-02 PROCEDURE — 86709 HEPATITIS A IGM ANTIBODY: CPT | Performed by: INTERNAL MEDICINE

## 2024-12-02 PROCEDURE — 82306 VITAMIN D 25 HYDROXY: CPT

## 2024-12-02 PROCEDURE — 86140 C-REACTIVE PROTEIN: CPT | Performed by: INTERNAL MEDICINE

## 2024-12-02 PROCEDURE — 86704 HEP B CORE ANTIBODY TOTAL: CPT | Performed by: INTERNAL MEDICINE

## 2024-12-02 PROCEDURE — 86708 HEPATITIS A ANTIBODY: CPT | Performed by: INTERNAL MEDICINE

## 2024-12-02 PROCEDURE — 87340 HEPATITIS B SURFACE AG IA: CPT | Performed by: INTERNAL MEDICINE

## 2024-12-02 PROCEDURE — 80053 COMPREHEN METABOLIC PANEL: CPT

## 2024-12-05 PROBLEM — R79.89 ELEVATED LFTS: Status: ACTIVE | Noted: 2024-12-05

## 2024-12-06 NOTE — PROGRESS NOTES
CMP Normal (electrolytes, kidney and liver functions), 1 liver enzyme is slightly elevated like prior.  Low-fat diet and weight loss will help with that.  Check liver ultrasound.  Orders written.  Also will need some more blood work nonfasting.  And awaiting some more testing.  Lipid (choilesterol) is worse.  Goal LDL should be less than 100.  Goal triglycerides should be less than 150.  Triglycerides are elevated with higher carbs.  Lower carbs helps lower triglycerides., Careful with diet.  Avoid red meat, shellfish, pork.  Try not to lepe foods.  Lower carb diet.  Exercise is most part at least 30 minutes 3-4 times a week sustained cardiovascular aerobic exercise getting that heart rate going and keeping it going for 30 minutes at a time.  If cannot do 30 will start with 10 minutes of brisk walking is good at each week build up 5 minutes more.  Recheck lipid in 3 months.  Orders written.  Hemoglobin A1c which is a 3-month average of sugars is much better than prior.  Good job!  Goal is 6.5 or less.  Vitamin D level looks good.  Continue the same.    Sed rate which is a nonspecific marker of inflammation is normal.

## 2024-12-07 ENCOUNTER — LAB ENCOUNTER (OUTPATIENT)
Dept: LAB | Facility: HOSPITAL | Age: 74
End: 2024-12-07
Attending: INTERNAL MEDICINE
Payer: MEDICARE

## 2024-12-07 DIAGNOSIS — R74.8 ACID PHOSPHATASE ELEVATED: Primary | ICD-10-CM

## 2024-12-07 DIAGNOSIS — R74.8 ELEVATED LIVER ENZYMES: ICD-10-CM

## 2024-12-07 LAB — IGA SERPL-MCNC: 233.4 MG/DL (ref 70–312)

## 2024-12-07 PROCEDURE — 82390 ASSAY OF CERULOPLASMIN: CPT | Performed by: INTERNAL MEDICINE

## 2024-12-07 PROCEDURE — 83516 IMMUNOASSAY NONANTIBODY: CPT | Performed by: INTERNAL MEDICINE

## 2024-12-07 PROCEDURE — 82784 ASSAY IGA/IGD/IGG/IGM EACH: CPT

## 2024-12-07 PROCEDURE — 86225 DNA ANTIBODY NATIVE: CPT

## 2024-12-07 PROCEDURE — 86038 ANTINUCLEAR ANTIBODIES: CPT

## 2024-12-07 PROCEDURE — 86364 TISS TRNSGLTMNASE EA IG CLAS: CPT

## 2024-12-07 PROCEDURE — 36415 COLL VENOUS BLD VENIPUNCTURE: CPT

## 2024-12-07 PROCEDURE — 82525 ASSAY OF COPPER: CPT

## 2024-12-09 LAB
DSDNA IGG SERPL IA-ACNC: 0.9 IU/ML
ENA AB SER QL IA: 0.1 UG/L
ENA AB SER QL IA: NEGATIVE
TTG IGA SER-ACNC: 0.3 U/ML (ref ?–7)

## 2024-12-10 LAB — COPPER: 109 UG/DL

## 2024-12-11 ENCOUNTER — TELEPHONE (OUTPATIENT)
Dept: INTERNAL MEDICINE CLINIC | Facility: CLINIC | Age: 74
End: 2024-12-11

## 2024-12-11 RX ORDER — KETOCONAZOLE 20 MG/ML
1 SHAMPOO, SUSPENSION TOPICAL
Qty: 120 ML | Refills: 1 | Status: SHIPPED | OUTPATIENT
Start: 2024-12-12

## 2024-12-11 NOTE — TELEPHONE ENCOUNTER
Pharmacy contacts clinic regarding ketoconazole shampoo prescription.  1 ml twice weekly would last patient over a year, requesting monthly supply so patient does not get \"refill too soon\" rejection. Prescription updated o reflect \"1 application\".  Please review and sign off if appropriate.

## 2025-01-15 ENCOUNTER — HOSPITAL ENCOUNTER (OUTPATIENT)
Dept: ULTRASOUND IMAGING | Facility: HOSPITAL | Age: 75
Discharge: HOME | End: 2025-01-15
Attending: INTERNAL MEDICINE
Payer: MEDICARE

## 2025-01-15 DIAGNOSIS — R74.8 ELEVATED LIVER ENZYMES: ICD-10-CM

## 2025-01-15 PROCEDURE — 76705 ECHO EXAM OF ABDOMEN: CPT | Performed by: INTERNAL MEDICINE

## 2025-01-15 PROCEDURE — 76981 USE PARENCHYMA: CPT | Performed by: INTERNAL MEDICINE

## 2025-01-27 ENCOUNTER — TELEPHONE (OUTPATIENT)
Dept: INTERNAL MEDICINE CLINIC | Facility: CLINIC | Age: 75
End: 2025-01-27

## 2025-01-27 NOTE — TELEPHONE ENCOUNTER
Patient called to request referral  to Hepatology to be faxed to below number so patient can schedule an appointment.     Fax # [phone]

## 2025-01-31 ENCOUNTER — TELEPHONE (OUTPATIENT)
Dept: INTERNAL MEDICINE CLINIC | Facility: CLINIC | Age: 75
End: 2025-01-31

## 2025-02-02 NOTE — TELEPHONE ENCOUNTER
Hepatology referral re-faxed to [phone] per the patient request via right fax. Print job completed message received.

## 2025-04-28 RX ORDER — HYDROCORTISONE 25 MG/G
1 CREAM TOPICAL 2 TIMES DAILY
Qty: 28 G | Refills: 0 | Status: SHIPPED | OUTPATIENT
Start: 2025-04-28

## 2025-04-28 NOTE — TELEPHONE ENCOUNTER
Refill Per Protocol     Requested Prescriptions   Pending Prescriptions Disp Refills    PROCTO-MED HC 2.5 % External Cream [Pharmacy Med Name: Procto-Med Hc 2.5 % Cre Lead] 28 g 0     Sig: Place 1 Application rectally 2 (two) times daily.       Gastrointestional Medication Protocol Passed - 4/28/2025  9:17 AM        Passed - In person appointment or virtual visit in the past 12 mos or appointment in next 3 mos     Recent Outpatient Visits              5 months ago [facility] esophag[facility]    Office Visit    7 months ago [facility]    [facility]    10 months ago [facility]otid [facility], [facility]    [facility]    10 months ago Elevated liver enzymes    [facility] [place] [facility] E[facility]    Office Visit    11 months ago Vitamin D deficiency, unspecified     [facility] [place] [facility] E[facility]    Office Visit          Future Appointments         Provider Department Appt Notes    In 1 month [facility] [place] [place] DUE FOR MEDICARE PX 11-1-2025                    Passed - Medication is active on med list

## 2025-05-13 ENCOUNTER — MED REC SCAN ONLY (OUTPATIENT)
Dept: INTERNAL MEDICINE CLINIC | Facility: CLINIC | Age: 75
End: 2025-05-13

## 2025-05-26 RX ORDER — MONTELUKAST SODIUM 10 MG/1
10 TABLET ORAL NIGHTLY PRN
Qty: 90 TABLET | Refills: 3 | Status: SHIPPED | OUTPATIENT
Start: 2025-05-26

## 2025-05-26 NOTE — TELEPHONE ENCOUNTER
Please review.  Protocol failed/has no protocol.      Last Office Visit: 11/27/2024  Please advise if refill is appropriate.

## 2025-05-30 ENCOUNTER — OFFICE VISIT (OUTPATIENT)
Dept: INTERNAL MEDICINE CLINIC | Facility: CLINIC | Age: 75
End: 2025-05-30

## 2025-05-30 VITALS
DIASTOLIC BLOOD PRESSURE: 80 MMHG | HEIGHT: 63 IN | TEMPERATURE: 98 F | SYSTOLIC BLOOD PRESSURE: 132 MMHG | BODY MASS INDEX: 36.75 KG/M2 | OXYGEN SATURATION: 100 % | HEART RATE: 66 BPM | WEIGHT: 207.38 LBS

## 2025-05-30 DIAGNOSIS — E78.00 HIGH CHOLESTEROL: ICD-10-CM

## 2025-05-30 DIAGNOSIS — Z12.31 BREAST CANCER SCREENING BY MAMMOGRAM: ICD-10-CM

## 2025-05-30 DIAGNOSIS — E55.9 VITAMIN D DEFICIENCY, UNSPECIFIED: Primary | ICD-10-CM

## 2025-05-30 DIAGNOSIS — R79.89 ELEVATED LFTS: ICD-10-CM

## 2025-05-30 PROCEDURE — 99213 OFFICE O/P EST LOW 20 MIN: CPT | Performed by: INTERNAL MEDICINE

## 2025-05-30 RX ORDER — NYSTATIN 10B UNIT
POWDER (EA) MISCELLANEOUS
Qty: 1 EACH | Refills: 11 | Status: SHIPPED | OUTPATIENT
Start: 2025-05-30

## 2025-05-30 RX ORDER — BETAMETHASONE DIPROPIONATE 0.5 MG/G
1 CREAM TOPICAL 2 TIMES DAILY
Qty: 45 G | Refills: 11 | Status: SHIPPED | OUTPATIENT
Start: 2025-05-30

## 2025-05-30 RX ORDER — NAPROXEN 500 MG/1
500 TABLET ORAL 2 TIMES DAILY
COMMUNITY
Start: 2024-12-29

## 2025-05-30 NOTE — PROGRESS NOTES
HPI:      Patient ID: Citlalli Najera is a 74 year old female.    Hypertension  Patient is here for follow up of hypertension. BP at home: not remember. Arm cuff 1 yr. Ago.   Has been compliant with medications.  Exercise level: exercises 5 times a  week (walking X 2 miles) and has been following low salt diet.  Weight has been stable. Cooks more. Not eat out. Uses salt.   Wt Readings from Last 3 Encounters:   05/30/25 207 lb 6.4 oz (94.1 kg)   11/27/24 205 lb 6.4 oz (93.2 kg)   09/30/24 212 lb (96.2 kg)     BP Readings from Last 3 Encounters:   05/30/25 139/61   11/27/24 127/63   09/30/24 146/77     Labs:   Lab Results   Component Value Date/Time     (H) 12/02/2024 07:31 AM     12/02/2024 07:31 AM    K 4.3 12/02/2024 07:31 AM     12/02/2024 07:31 AM    CO2 30.0 12/02/2024 07:31 AM    CREATSERUM 0.68 12/02/2024 07:31 AM    CA 9.9 12/02/2024 07:31 AM    AST 36 (H) 12/02/2024 07:31 AM    ALT 37 12/02/2024 07:31 AM    TSH 0.730 09/29/2022 11:40 AM        Lab Results   Component Value Date/Time    CHOLEST 197 12/02/2024 07:31 AM    HDL 55 12/02/2024 07:31 AM    TRIG 175 (H) 12/02/2024 07:31 AM     (H) 12/02/2024 07:31 AM    NONHDLC 142 (H) 12/02/2024 07:31 AM            Wt Readings from Last 3 Encounters:   05/30/25 207 lb 6.4 oz (94.1 kg)   11/27/24 205 lb 6.4 oz (93.2 kg)   09/30/24 212 lb (96.2 kg)     BP Readings from Last 3 Encounters:   05/30/25 139/61   11/27/24 127/63   09/30/24 146/77     Labs:   Lab Results   Component Value Date/Time     (H) 12/02/2024 07:31 AM     12/02/2024 07:31 AM    K 4.3 12/02/2024 07:31 AM     12/02/2024 07:31 AM    CO2 30.0 12/02/2024 07:31 AM    CREATSERUM 0.68 12/02/2024 07:31 AM    CA 9.9 12/02/2024 07:31 AM    AST 36 (H) 12/02/2024 07:31 AM    ALT 37 12/02/2024 07:31 AM    TSH 0.730 09/29/2022 11:40 AM        Lab Results   Component Value Date/Time    CHOLEST 197 12/02/2024 07:31 AM    HDL 55 12/02/2024 07:31 AM    TRIG 175 (H)  12/02/2024 07:31 AM     (H) 12/02/2024 07:31 AM    NONHDLC 142 (H) 12/02/2024 07:31 AM          Here for follow-up.  Sleeps good.  No more headaches.    Needs a refill on antifungal cream.  Sweats during the summer developed a red rash and then itches and skin breaks.  Also happens below the belly because the belly hangs a little bit.  Has been losing weight because of walking more and being very careful with diet.          Review of Systems   Constitutional:  Negative for activity change, appetite change, chills, diaphoresis, fatigue, fever and unexpected weight change.   Respiratory:  Negative for apnea, cough, choking, chest tightness, shortness of breath, wheezing and stridor.    Cardiovascular:  Negative for chest pain, palpitations and leg swelling.   Gastrointestinal:  Negative for abdominal distention and abdominal pain.   Endocrine: Negative for cold intolerance, heat intolerance, polydipsia, polyphagia and polyuria.   Skin:  Positive for rash.   Neurological:  Negative for dizziness, tremors, seizures, syncope, facial asymmetry, speech difficulty, weakness, light-headedness, numbness and headaches.   All other systems reviewed and are negative.        Current Outpatient Medications   Medication Sig Dispense Refill    naproxen 500 MG Oral Tab Take 1 tablet (500 mg total) by mouth 2 (two) times daily.      montelukast 10 MG Oral Tab Take 1 tablet (10 mg total) by mouth nightly as needed. 90 tablet 3    hydrocortisone (PROCTO-MED HC) 2.5 % External Cream Place 1 Application rectally 2 (two) times daily. 28 g 0    ketoconazole 2 % External Shampoo Apply 1 Application topically twice a week. 120 mL 1    Amitriptyline HCl 100 MG Oral Tab Take 1 tablet (100 mg total) by mouth nightly. 90 tablet 3    clotrimazole-betamethasone 1-0.05 % External Cream APPLY 1 APPLICATION TOPICALLY EVERY 12 HOURS 60 g 3    diazePAM 10 MG Oral Tab 1-2 pills 15 min before MRI 2 tablet 0    diazePAM (VALIUM) 2 MG Oral Tab Take 1  tablet (2 mg total) by mouth every 8 (eight) hours as needed (vertigo). 30 tablet 0    meclizine 25 MG Oral Tab Take 1 tablet (25 mg total) by mouth 3 (three) times daily as needed. 15 tablet 0    celecoxib 100 MG Oral Cap Take 1 capsule (100 mg total) by mouth 2 (two) times daily. 180 capsule 1    betamethasone dipropionate 0.05 % External Cream Apply 1 Application  topically 2 (two) times daily. APPLY TO AFFECTED AREA 45 g 3    clobetasol 0.05 % External Cream Apply 1 Application  topically 2 (two) times daily. 45 g 0    albuterol (PROAIR HFA) 108 (90 Base) MCG/ACT Inhalation Aero Soln Inhale 2 puffs into the lungs every 4 (four) hours as needed for Wheezing. 1 each 0    pantoprazole 40 MG Oral Tab EC Take 1 tablet (40 mg total) by mouth before breakfast. 90 tablet 3    ALREX 0.2 % Ophthalmic Suspension        Allergies:  Allergies   Allergen Reactions    Corn OTHER (SEE COMMENTS)     Allergy Test Screening    Dust Mite Mixed Allergen Ext [Mite (D. Farinae)] OTHER (SEE COMMENTS)     Allergy Test Screening    Fish OTHER (SEE COMMENTS)     Allergy Test Screening    Fish-Derived Products OTHER (SEE COMMENTS)     Allergy Test Screening    Other OTHER (SEE COMMENTS)     Allergy Test Screening - Cockroach    Shrimp OTHER (SEE COMMENTS)     Allergy Test Screening       HISTORY:  Past Medical History:    Acid reflux disease    Breast mass in female    Difficulty sleeping    Elevated liver enzymes    Fatty liver disease, nonalcoholic    Pneumonia due to organism    Postmenopausal bleeding    Prediabetes    Submucous uterine fibroid    Vaccine counseling    Visual impairment    glasses      Past Surgical History:   Procedure Laterality Date    Breast surgery Right 2000    Removed Mass from breast two times Benign.     Colonoscopy  10/2018    Colonoscopy N/A 10/18/2018    Procedure: COLONOSCOPY, POSSIBLE BIOPSY, POSSIBLE POLYPECTOMY 87705;  Surgeon: Willis Nelson MD;  Location: [facility] SURGICAL [facility]  localization wire 1 site left (cpt=19281)      Needle biopsy left        Family History   Problem Relation Age of Onset    Diabetes Mother       Social History:   Social History     Socioeconomic History    Marital status:    Occupational History    Occupation: Retired from [employer].    Tobacco Use    Smoking status: Former     Current packs/day: 0.00     Average packs/day: 1 pack/day for 40.0 years (40.0 ttl pk-yrs)     Types: Cigarettes     Start date:      Quit date:      Years since quittin.4    Smokeless tobacco: Never   Vaping Use    Vaping status: Never Used   Substance and Sexual Activity    Alcohol use: No    Drug use: No   Social History Narrative      5 months ago.      Social Drivers of Health     Food Insecurity: No Food Insecurity (2024)    Food Insecurity     Food Insecurity: Never true   Transportation Needs: No Transportation Needs (2024)    Transportation Needs     Lack of Transportation: No   Housing Stability: Low Risk  (2024)    Housing Stability     Housing Instability: No        PHYSICAL EXAM:   /61 (BP Location: Left arm, Patient Position: Sitting, Cuff Size: large)   Pulse 66   Temp 97.5 °F (36.4 °C) (Temporal)   Ht 5' 3\" (1.6 m)   Wt 207 lb 6.4 oz (94.1 kg)   LMP  (LMP Unknown)   SpO2 100%   BMI 36.74 kg/m²   BP Readings from Last 3 Encounters:   25 139/61   24 127/63   24 146/77     Wt Readings from Last 3 Encounters:   25 207 lb 6.4 oz (94.1 kg)   24 205 lb 6.4 oz (93.2 kg)   24 212 lb (96.2 kg)       Physical Exam  Vitals and nursing note reviewed.   Constitutional:       General: She is not in acute distress.     Appearance: Normal appearance. She is well-developed. She is not ill-appearing, toxic-appearing or diaphoretic.      Interventions: She is not intubated.  Neck:      Thyroid: No thyroid mass or thyromegaly.      Trachea: Trachea and phonation normal.   Cardiovascular:       Rate and Rhythm: Normal rate and regular rhythm.      Pulses: Normal pulses. No decreased pulses.           Carotid pulses are 2+ on the right side and 2+ on the left side.       Radial pulses are 2+ on the right side and 2+ on the left side.        Dorsalis pedis pulses are 2+ on the right side and 2+ on the left side.        Posterior tibial pulses are 2+ on the right side and 2+ on the left side.      Heart sounds: Normal heart sounds, S1 normal and S2 normal.   Pulmonary:      Effort: Pulmonary effort is normal. No tachypnea, bradypnea, accessory muscle usage, prolonged expiration, respiratory distress or retractions. She is not intubated.      Breath sounds: Normal breath sounds and air entry. No stridor, decreased air movement or transmitted upper airway sounds. No decreased breath sounds, wheezing, rhonchi or rales.   Chest:      Chest wall: No tenderness.   Abdominal:      General: There is no distension.      Palpations: Abdomen is soft.      Tenderness: There is no abdominal tenderness.   Musculoskeletal:      Right lower leg: No edema.      Left lower leg: No edema.   Skin:     General: Skin is warm and dry.      Comments: Patient says nothing right now on skin and hold exam.   Neurological:      Mental Status: She is alert and oriented to person, place, and time.   Psychiatric:         Behavior: Behavior normal. Behavior is cooperative.         Thought Content: Thought content normal.              ASSESSMENT/PLAN:     Encounter Diagnoses   Name Primary?    Vitamin D deficiency, unspecified  Check blood.    Yes    High cholesterol Stable.        Elevated LFTs Weight loss.       Sweaty.  Develops a rash in perineal and below belly in the summer when sweats.  Will try cream with powder over and can maintain powder.  Discussed about side effects and use.    Use montelukast as needed.  Can hold off using regular.    No orders of the defined types were placed in this encounter.      Meds This Visit:  Requested  Prescriptions      No prescriptions requested or ordered in this encounter       Imaging & Referrals:  None     Return to clinic November 27 for Medicare physical.

## 2025-05-30 NOTE — PATIENT INSTRUCTIONS
ASSESSMENT/PLAN:     Encounter Diagnoses   Name Primary?    Vitamin D deficiency, unspecified  Check blood.    Yes    High cholesterol Stable.        Elevated LFTs Weight loss.       Sweaty.  Develops a rash in perineal and below belly in the summer when sweats.  Will try cream with powder over and can maintain powder.  Discussed about side effects and use.    Use montelukast as needed.  Can hold off using regular.    No orders of the defined types were placed in this encounter.      Meds This Visit:  Requested Prescriptions      No prescriptions requested or ordered in this encounter       Imaging & Referrals:  None     Return to clinic November 27 for Medicare physical.   Wound care instructions given to patient

## (undated) DIAGNOSIS — Z12.31 SCREENING MAMMOGRAM FOR BREAST CANCER: Primary | ICD-10-CM

## (undated) DIAGNOSIS — E78.5 HYPERLIPIDEMIA, UNSPECIFIED HYPERLIPIDEMIA TYPE: Primary | ICD-10-CM

## (undated) DIAGNOSIS — R74.8 ELEVATED LIVER ENZYMES: ICD-10-CM

## (undated) DEVICE — WOLF INFLOW HYSTER

## (undated) DEVICE — DEVICE SPEC RTRVL MYOSURE

## (undated) DEVICE — MYOSURE SINGLE USE SEAL SET

## (undated) DEVICE — SOL  .9 3000ML

## (undated) DEVICE — STIRRUP STRAP W/SLING RING

## (undated) DEVICE — STERILE LATEX POWDER-FREE SURGICAL GLOVESWITH NITRILE COATING: Brand: PROTEXIS

## (undated) DEVICE — MEDI-VAC NON-CONDUCTIVE SUCTION TUBING: Brand: CARDINAL HEALTH

## (undated) DEVICE — HYSTEROSCOPY: Brand: MEDLINE INDUSTRIES, INC.

## (undated) DEVICE — SOCK CNSTR 4IN TNPSL UNV SPEC

## (undated) NOTE — LETTER
12/30/2019              Beverley Hess        [address] [facility]         [place] [id number]         Reyna Garay,    Fue un placer verla. Ledbetter resultado del pap and Hpv cotesting es normal. No necesita más pruebas en aury momento.  Espero verla

## (undated) NOTE — LETTER
[address], [facility]   Date:   9/29/2022     Name:   Mi Xie    YOB: 1950   MRN:   [id number]       WHERE IS YOUR PAIN NOW? Fernandez the areas on your body where you feel the described sensations. Use the appropriate symbol. Cherylene Bloodgood the areas of radiation. Include all affected areas. Just to complete the picture, please draw in the face. ACHE:  ^ ^ ^   NUMBNESS:  0000   PINS & NEEDLES:  = = = =                              ^ ^ ^                       0000              = = = =                                    ^ ^ ^                       0000            = = = =      BURNING:  XXXX   STABBING: ////                  XXXX                ////                         XXXX          ////     Please fernandez the line below indicating your degree of pain right now  with 0 being no pain 10 being the worst pain possible.                                          0             1             2              3             4              5              6              7             8             9             10         Patient Signature:

## (undated) NOTE — MR AVS SNAPSHOT
After Visit Summary   12/26/2019    Brooks Rosa    MRN: [id number]           Visit Information     Date & Time  12/26/2019  2:20 PM Provider  Mary Paredes, [address] [place] [address], [address] [place] [address], [facility]  Phone  [phone] FLUoxetine HCl 20 MG Oral Cap Take 1 capsule (20 mg total) by mouth nightly. clotrimazole-betamethasone 1-0.05 % External Cream 1 q12hrs. ALPRAZolam 0.5 MG Oral Tab Take 1 tablet (0.5 mg total) by mouth nightly as needed for Sleep or Anxiety.       D with them. Please do not bring your child/children without a caregiver.  Because of the highly sensitive equipment and privacy of all our patients, children will not be permitted in the exam rooms, unless otherwise noted and in   accordance with departmenta staff. Remember, [address] is NOT to be used for urgent needs. For medical emergencies, dial 911. Sincerely,    Maria Guadalupe Garrison MD              [facility] now offers Video Visits through [address] for adult and pediatric patients.   Video Visits are available [facility]   Monday – Friday  10:00 am – 10:00 pm   Saturday – Sunday  10:00 am – 4:00 pm     [address]   Monday – Friday  10:00 am – 4:00 pm   Saturday – Sunday  10:00 am – 4:00 pm  W[facility]IN [facility]ARE  E

## (undated) NOTE — LETTER
[address], [facility]   Date:   10/19/2022     Name:   Yas Dumont    YOB: 1950   MRN:   [id number]       WHERE IS YOUR PAIN NOW? Fernandez the areas on your body where you feel the described sensations. Use the appropriate symbol. Silvio Hung the areas of radiation. Include all affected areas. Just to complete the picture, please draw in the face. ACHE:  ^ ^ ^   NUMBNESS:  0000   PINS & NEEDLES:  = = = =                              ^ ^ ^                       0000              = = = =                                    ^ ^ ^                       0000            = = = =      BURNING:  XXXX   STABBING: ////                  XXXX                ////                         XXXX          ////     Please fernandez the line below indicating your degree of pain right now  with 0 being no pain 10 being the worst pain possible.                                          0             1             2              3             4              5              6              7             8             9             10         Patient Signature:

## (undated) NOTE — LETTER
AUTHORIZATION FOR SURGICAL OPERATION OR OTHER PROCEDURE    1.  I hereby authorize Dr. Perri Jaffe  and [facility] staff assigned to my case to perform the following operation and/or procedure at the [facility]:    Cortisone injection in [place] Time:  ________ A. M.  P.M.        Patient Name:  ______________________________________________________  (please print)      Patient signature:  ___________________________________________________             Relationship to Patient:

## (undated) NOTE — LETTER
12/7/2017              Ozzy Carter        [id number]  [facility] [id number] [facility]         Dear Pamela Montoya,    It was a pleasure to see you. Your pap was normal.  There is no need for further testing at this time.   I look forward to seeing you at

## (undated) NOTE — Clinical Note
FYI, TCM call made, see NCM notes. NCM Confirmed PCP office appointment with patient on 5/24/2024 at 9:00 am. NCM changed visit type to TCM.

## (undated) NOTE — LETTER
Patient Name: Citlalil Najera  YOB: 1950          MRN :  [id number]  Date:  4/25/2024  Referring Physician:  Stefani Rehman  Pt has attended 5 visits in Physical Therapy.   Reporting period: 3/24/24 to 4/25/2024      Dx: Dizziness (R42) R BPPV             Insurance (Authorized # of Visits):  10 medicare           Authorizing Physician: Dr. Kendall Harvey MD visit: none  Fall Risk: standard         Precautions: n/a         Evaluation Date: 3/24/23  Previous Level of Function: pt was able to get into/out of bed without symptoms .     Subjective: Pt reports that she has had no dizziness at home and has been doing good.   Pain 0/10  Objective: silvia hallpike R + with delayed onset and reported dizziness x 1 sec      Assessment: Performed epley x1 today secondary to minimal report of dizziness. Pt has experienced no dizziness at home and will be discharged from PT. Pt was educated on what to do if symptoms return.    Goals:   1.Pt. to be independent home exercise program, post treatment instructions to allow patients safe return to ADL’s.-met  2..Pt to be able to get into/out of bed without symptoms.-met  3. Pt to be able to demo quick head turns from R/L in order to assist with crossing the street.-met  4. Pt to report ability to bend forward to tie shoe laces without dizziness-not assessed      Plan: Discharge from PT    Patient was advised of these findings, precautions, and treatment options and has agreed to actively participate in planning and for this course of care.    Thank you for your referral. If you have any questions, please contact me at Dept: [phone].    Sincerely,  Electronically signed by therapist Piper Lo PT, DPT, cert MDT    Physician's certification required:  No             21st Century Cures Act Notice to Patient: Medical documents like this are made available to patients in the interest of transparency. However, be advised this is a medical document  and it is intended as [id number] communication between your medical providers. This medical document may contain abbreviations, assessments, medical data, and results or other terms that are unfamiliar. Medical documents are intended to carry relevant information, facts as evident, and the clinical opinion of the practitioner. As such, this medical document may be written in language that appears blunt or direct. You are encouraged to contact your medical provider and/or [facility] Patient Experience if you have any questions about this medical document.

## (undated) NOTE — LETTER
11/14/2018              Angie Grace        [address] [facility]         [address] [address]         Dear Tamanna Doty,    It was a pleasure to see you. Your pap and Hpv cotesting was normal.  There is no need for further testing at this time.   I look

## (undated) NOTE — Clinical Note
Dear Dr. Shiv Wing,    Thank you for sending [place] to see me for physiatry consultation. I appreciate your confidence in me to care for your patients. Please feel free call me with any questions at [phone] or contact me through [facility]     Sincerely,  Héctor Talamantes MD  Board Certified, Physical Medicine and Rehabilitation  Specializing in [address] [facility], Spine Medicine and [address] [facility]